# Patient Record
Sex: FEMALE | Race: WHITE | NOT HISPANIC OR LATINO | Employment: PART TIME | URBAN - METROPOLITAN AREA
[De-identification: names, ages, dates, MRNs, and addresses within clinical notes are randomized per-mention and may not be internally consistent; named-entity substitution may affect disease eponyms.]

---

## 2023-05-30 PROBLEM — R91.8 PULMONARY NODULES: Status: ACTIVE | Noted: 2023-05-30

## 2023-05-30 PROBLEM — J34.89 NASAL OBSTRUCTION: Status: ACTIVE | Noted: 2023-05-30

## 2023-05-30 PROBLEM — J32.9 CHRONIC SINUSITIS: Status: ACTIVE | Noted: 2023-05-30

## 2023-05-30 PROBLEM — H72.92 ACUTE OTITIS MEDIA WITH PERFORATION, LEFT: Status: ACTIVE | Noted: 2023-05-30

## 2023-05-30 PROBLEM — H72.00 CENTRAL PERFORATION OF TYMPANIC MEMBRANE: Status: ACTIVE | Noted: 2023-05-30

## 2023-05-30 PROBLEM — D17.9 LIPOMA: Status: ACTIVE | Noted: 2023-05-30

## 2023-05-30 PROBLEM — Z90.10 ABSENCE OF BREAST, ACQUIRED: Status: ACTIVE | Noted: 2023-05-30

## 2023-05-30 PROBLEM — H90.5 SENSORINEURAL HEARING LOSS OF RIGHT EAR: Status: ACTIVE | Noted: 2023-05-30

## 2023-05-30 PROBLEM — B37.0 ORAL THRUSH: Status: ACTIVE | Noted: 2023-05-30

## 2023-05-30 PROBLEM — M79.639 FOREARM PAIN: Status: ACTIVE | Noted: 2023-05-30

## 2023-05-30 PROBLEM — R07.89 CHEST DISCOMFORT: Status: ACTIVE | Noted: 2023-05-30

## 2023-05-30 PROBLEM — R22.0 LIP SWELLING: Status: ACTIVE | Noted: 2023-05-30

## 2023-05-30 PROBLEM — L98.9 SCALP LESION: Status: ACTIVE | Noted: 2023-05-30

## 2023-05-30 PROBLEM — Z98.84 S/P GASTRIC BYPASS: Status: ACTIVE | Noted: 2023-05-30

## 2023-05-30 PROBLEM — R42 DIZZINESS: Status: ACTIVE | Noted: 2023-05-30

## 2023-05-30 PROBLEM — M54.2 CERVICALGIA: Status: ACTIVE | Noted: 2023-05-30

## 2023-05-30 PROBLEM — R06.09 DYSPNEA ON EXERTION: Status: ACTIVE | Noted: 2023-05-30

## 2023-05-30 PROBLEM — J84.10 LUNG GRANULOMA (MULTI): Status: ACTIVE | Noted: 2023-05-30

## 2023-05-30 PROBLEM — T78.40XA ALLERGIC REACTION: Status: ACTIVE | Noted: 2023-05-30

## 2023-05-30 PROBLEM — H92.02 OTALGIA OF LEFT EAR: Status: ACTIVE | Noted: 2023-05-30

## 2023-05-30 PROBLEM — S16.1XXA CERVICAL STRAIN, INITIAL ENCOUNTER: Status: ACTIVE | Noted: 2023-05-30

## 2023-05-30 PROBLEM — H81.09 MENIERES DISEASE: Status: ACTIVE | Noted: 2023-05-30

## 2023-05-30 PROBLEM — H66.92 ACUTE OTITIS MEDIA WITH PERFORATION, LEFT: Status: ACTIVE | Noted: 2023-05-30

## 2023-05-30 PROBLEM — R91.8 LUNG MASS: Status: ACTIVE | Noted: 2023-05-30

## 2023-05-30 PROBLEM — S61.019A THUMB LACERATION: Status: ACTIVE | Noted: 2023-05-30

## 2023-05-30 PROBLEM — S52.123A RADIAL HEAD FRACTURE: Status: ACTIVE | Noted: 2023-05-30

## 2023-05-30 PROBLEM — H90.72 MIXED HEARING LOSS OF LEFT EAR: Status: ACTIVE | Noted: 2023-05-30

## 2023-05-30 PROBLEM — I25.10 CAD (CORONARY ARTERY DISEASE): Status: ACTIVE | Noted: 2023-05-30

## 2023-05-30 PROBLEM — M25.529 ELBOW PAIN: Status: ACTIVE | Noted: 2023-05-30

## 2023-05-30 PROBLEM — M25.539 WRIST PAIN: Status: ACTIVE | Noted: 2023-05-30

## 2023-05-30 PROBLEM — H65.01: Status: ACTIVE | Noted: 2023-05-30

## 2023-05-30 PROBLEM — Z90.13 ACQUIRED ABSENCE OF BOTH NIPPLES: Status: ACTIVE | Noted: 2023-05-30

## 2023-05-30 PROBLEM — H92.11 OTORRHEA OF RIGHT EAR: Status: ACTIVE | Noted: 2023-05-30

## 2023-05-30 PROBLEM — M25.332 SCAPHOLUNATE DISSOCIATION OF LEFT WRIST: Status: ACTIVE | Noted: 2023-05-30

## 2023-05-30 PROBLEM — E53.8 VITAMIN B12 DEFICIENCY: Status: ACTIVE | Noted: 2023-05-30

## 2023-05-30 PROBLEM — J33.9 NASAL POLYPS: Status: ACTIVE | Noted: 2023-05-30

## 2023-05-30 PROBLEM — E03.9 HYPOTHYROIDISM: Status: ACTIVE | Noted: 2023-05-30

## 2023-05-30 NOTE — PROGRESS NOTES
Subjective   Patient ID: Malathi Castellanos is a 58 y.o. female who presents for Follow-up, Coronary Artery Disease, Hypothyroidism, and MED refill. I last saw the patient on 06/28/2022.     HPI   Patient c/o frequent lip blisters. Onset off and on x 1-2 yrs. She states that she has seen numerous doctors and her lips continue to get worse. She admits to pain and the sores seem to be spreading. She states that it does not have a tingling like a cold sore. Multiple OTCs with no relief. She adds that the sores on the sides have not gone away at all, but the blisters come and go. She has not seen dermatology yet, she plans to schedule an appointment with Dr. Masterson.     She has not been taking her iron supplement regularly, but she states that she knows when her iron is going lower and will take her supplement then.     She mentions that she cannot lose weight. She tries to walk regularly. She has tried a weight loss injection in the past and notes she started to have reactions at the injection sites.     She notes that she stubbed her toe on Mother's Day and would like an x-ray. She adds that it did bend backward when it happened.     Review of Systems  Except positives as noted in the CC & HPI      Constitutional: Denies fevers, chills, night sweats, fatigue, weight changes, change in appetite    Eyes: Denies blurry vision, double vision    ENT: Denies otalgia, trouble hearing, tinnitus, vertigo, nasal congestion, rhinorrhea, sore throat    Neck: Denies swelling, masses    Cardiovascular: Denies chest pain, palpitations, edema, orthopnea, syncope    Respiratory: Denies dyspnea, cough, wheezing, postural nocturnal dyspnea    Gastrointestinal: Denies abdominal pain, nausea, vomiting, diarrhea, constipation, melena, hematochezia    Genitourinary: Denies dysuria, hematuria, frequency, urgency    Musculoskeletal: Denies back pain, neck pain, myalgias    Integumentary: Denies skin lesions, rashes  Neurological: Denies dizziness,  "headaches, confusion, limb weakness, paresthesias, syncope, convulsions    Psychiatric: Denies depression, anxiety, homicidal ideations, suicidal ideations, sleep disturbances    Endocrine: Denies polyphagia, polydipsia, polyuria, weakness, hair thinning, heat intolerance, cold intolerance, weight changes    Heme/Lymph: Denies easy bruising, easy bleeding, swollen glands     Objective   /72 (BP Location: Left arm, Patient Position: Sitting)   Pulse 77   Temp 36.7 °C (98.1 °F) (Temporal)   Resp 16   Ht 1.727 m (5' 8\")   Wt 95.7 kg (211 lb)   SpO2 98%   BMI 32.08 kg/m²     Physical Exam  114/76 on recheck of BP in the right arm.     Gen. Appearance - well-developed, well-nourished, 58 y.o., White female in no acute distress.     Skin - warm, pink and dry without rash or concerning lesions.    Mental Status - alert and oriented times 3. Normal mood and affect appropriate to mood.     Ears - Ear canals are clear bilaterally.  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 Chronic perforation on the left.     Mouth - pharynx is pink without exudates. Dentition is normal appearing. Tongue and uvula move in the midline. Mild erythema at the corners of the mouth, bilaterally, R > L. Area of erythema along the vermilion border of her lip.     Neck - supple without lymphadenopathy. Carotid pulses are normal without bruits. Thyroid is normal in midline without nodules.    Chest - lungs are clear to auscultation without rales, rhonchi or wheezes.    Heart - regular, rate, and rhythm without murmurs, rubs or gallops.     Abdomen - soft, obese, protuberant, nontender, nondistended. No masses, hepatomegaly or splenomegaly is noted. No rebound, rigidity or guarding is noted. Bowel sounds are normoactive.     Extremities - no cyanosis, clubbing or edema. Pedal pulses are 2+ normal at the dorsalis pedis and posterior tibial pulses bilaterally.     Right Foot - Normal alignment and arch. Skin, nails, color, turgor " are normal. No pain to palpation. Sensation intact. Muscle strength +5/5. Dorsalis pedis pulses normal. FROM of all joints. Gait normal. Pain with extension of the right great toe. Pain at the MTP joint, laterally.     Neurological - cranial nerves II through XII are grossly intact. Motor strength 5/5 at all fours.     Assessment/Plan   1. Healthcare maintenance  CBC and Auto Differential    Comprehensive Metabolic Panel      2. Hypothyroidism, unspecified type  levothyroxine (Synthroid, Levoxyl) 50 mcg tablet    TSH with reflex to Free T4 if abnormal    Follow Up In [facility] - PCP      3. Dizziness  triamterene-hydrochlorothiazid (Maxzide-25) 37.5-25 mg tablet      4. Coronary artery disease involving native heart, unspecified vessel or lesion type, unspecified whether angina present  Follow Up In [facility] - PCP      5. Candidal cheilitis  nystatin (Mycostatin) cream    Follow Up In [facility] - PCP      6. Pain of toe of right foot  XR toe right 2+ views      7. Mild anemia  Iron and TIBC    Folate      8. Vitamin B12 deficiency  Vitamin B12    DISCONTINUED: cyanocobalamin (Vitamin B-12) 1,000 mcg/mL injection      9. Class 1 obesity due to excess calories without serious comorbidity with body mass index (BMI) of 32.0 to 32.9 in adult  phentermine (Adipex-P) 37.5 mg tablet      10. S/P gastric bypass        Patient to continue current medications (with any exceptions as noted) and diet. Follow-up in 1 month(s) otherwise as needed.      Will obtain CBC, iron, TSH, CMP, B12, folate today. Will call patient with results when available.     Patient was started on:   Adipex 37.5 mg, TAKE 1 TAB BY MOUTH DAILY   Nystatin 591228 unit/gm Cream, APPLY AND RUB IN A THIN FILM TO AFFECTED AREA(S) TWICE DAILY    Patient was given refill(s) on:   Levothyroxine Sodium 50 mcg, TAKE 1 TAB BY MOUTH DAILY  Triamterene-hydrochlorothiazide 37.5-25 mg, TAKE 1 TAB BY MOUTH DAILY   Vitamin B12 1000  mcg/ml, INJECT AS DIRECTED    Rx(s) sent to pharmacy.     Ordered right toe x-rays. Will call patient with results when available.     Patient is to follow up with Dr. Masterson (derm).    Recommend she apply 30 SPF lip balm.     Patient signed medication contract today.     I personally have reviewed the OARRS report for this patient. This report is scanned into the electronic medical record. I have considered the risks of abuse, dependence, addiction and diversion. I believe that it is clinically appropriate for the patient to be prescribed this medication.       Scribe Attestation  By signing my name below, I, Faizan Sierra   attest that this documentation has been prepared under the direction and in the presence of Allan Tubbs MD.

## 2023-05-31 ENCOUNTER — TELEPHONE (OUTPATIENT)
Dept: PRIMARY CARE | Facility: CLINIC | Age: 58
End: 2023-05-31

## 2023-05-31 ENCOUNTER — OFFICE VISIT (OUTPATIENT)
Dept: PRIMARY CARE | Facility: CLINIC | Age: 58
End: 2023-05-31
Payer: COMMERCIAL

## 2023-05-31 VITALS
OXYGEN SATURATION: 98 % | BODY MASS INDEX: 31.98 KG/M2 | RESPIRATION RATE: 16 BRPM | SYSTOLIC BLOOD PRESSURE: 107 MMHG | DIASTOLIC BLOOD PRESSURE: 72 MMHG | HEIGHT: 68 IN | WEIGHT: 211 LBS | HEART RATE: 77 BPM | TEMPERATURE: 98.1 F

## 2023-05-31 DIAGNOSIS — E66.09 CLASS 1 OBESITY DUE TO EXCESS CALORIES WITHOUT SERIOUS COMORBIDITY WITH BODY MASS INDEX (BMI) OF 32.0 TO 32.9 IN ADULT: ICD-10-CM

## 2023-05-31 DIAGNOSIS — D64.9 MILD ANEMIA: ICD-10-CM

## 2023-05-31 DIAGNOSIS — E03.9 HYPOTHYROIDISM, UNSPECIFIED TYPE: ICD-10-CM

## 2023-05-31 DIAGNOSIS — B37.83 CANDIDAL CHEILITIS: ICD-10-CM

## 2023-05-31 DIAGNOSIS — E53.8 VITAMIN B12 DEFICIENCY: ICD-10-CM

## 2023-05-31 DIAGNOSIS — R42 DIZZINESS: ICD-10-CM

## 2023-05-31 DIAGNOSIS — Z00.00 HEALTHCARE MAINTENANCE: Primary | ICD-10-CM

## 2023-05-31 DIAGNOSIS — Z98.84 S/P GASTRIC BYPASS: ICD-10-CM

## 2023-05-31 DIAGNOSIS — M79.674 PAIN OF TOE OF RIGHT FOOT: ICD-10-CM

## 2023-05-31 DIAGNOSIS — I25.10 CORONARY ARTERY DISEASE INVOLVING NATIVE HEART, UNSPECIFIED VESSEL OR LESION TYPE, UNSPECIFIED WHETHER ANGINA PRESENT: ICD-10-CM

## 2023-05-31 PROBLEM — E66.811 CLASS 1 OBESITY DUE TO EXCESS CALORIES WITHOUT SERIOUS COMORBIDITY WITH BODY MASS INDEX (BMI) OF 32.0 TO 32.9 IN ADULT: Status: ACTIVE | Noted: 2023-05-31

## 2023-05-31 PROCEDURE — 99215 OFFICE O/P EST HI 40 MIN: CPT | Performed by: FAMILY MEDICINE

## 2023-05-31 PROCEDURE — 3008F BODY MASS INDEX DOCD: CPT | Performed by: FAMILY MEDICINE

## 2023-05-31 RX ORDER — LEVOTHYROXINE SODIUM 50 UG/1
50 TABLET ORAL DAILY
Qty: 90 TABLET | Refills: 3 | Status: SHIPPED | OUTPATIENT
Start: 2023-05-31 | End: 2023-05-31

## 2023-05-31 RX ORDER — LANOLIN ALCOHOL/MO/W.PET/CERES
1000 CREAM (GRAM) TOPICAL DAILY
COMMUNITY
End: 2023-05-31 | Stop reason: ENTERED-IN-ERROR

## 2023-05-31 RX ORDER — CYANOCOBALAMIN 1000 UG/ML
1000 INJECTION, SOLUTION INTRAMUSCULAR; SUBCUTANEOUS
Qty: 10 ML | Refills: 0 | Status: SHIPPED | OUTPATIENT
Start: 2023-05-31 | End: 2023-05-31

## 2023-05-31 RX ORDER — CYANOCOBALAMIN 1000 UG/ML
1000 INJECTION, SOLUTION INTRAMUSCULAR; SUBCUTANEOUS ONCE
Qty: 10 ML | Refills: 1 | Status: SHIPPED | OUTPATIENT
Start: 2023-05-31 | End: 2023-05-31 | Stop reason: SDUPTHER

## 2023-05-31 RX ORDER — TRIAMTERENE/HYDROCHLOROTHIAZID 37.5-25 MG
1 TABLET ORAL DAILY
Qty: 90 TABLET | Refills: 3 | Status: SHIPPED | OUTPATIENT
Start: 2023-05-31 | End: 2024-02-22 | Stop reason: SDUPTHER

## 2023-05-31 RX ORDER — NYSTATIN 100000 U/G
CREAM TOPICAL 2 TIMES DAILY
Qty: 15 G | Refills: 0 | Status: SHIPPED | OUTPATIENT
Start: 2023-05-31 | End: 2023-06-07

## 2023-05-31 RX ORDER — PHENTERMINE HYDROCHLORIDE 37.5 MG/1
37.5 TABLET ORAL
Qty: 30 TABLET | Refills: 0 | Status: SHIPPED | OUTPATIENT
Start: 2023-05-31 | End: 2023-06-28 | Stop reason: SDUPTHER

## 2023-05-31 NOTE — PATIENT INSTRUCTIONS
Patient to continue current medications (with any exceptions as noted) and diet. Follow-up in 1 month(s) otherwise as needed.      Will obtain CBC, iron, TSH, CMP, B12, folate today. Will call patient with results when available.     Patient was started on:   Adipex   Nystatin [id number] unit/gm Cream, APPLY AND RUB IN A THIN FILM TO AFFECTED AREA(S) TWICE DAILY    Patient was given refill(s) on:   Levothyroxine Sodium 50 mcg, TAKE 1 TAB BY MOUTH DAILY  Triamterene-hydrochlorothiazide 37.5-25 mg, TAKE 1 TAB BY MOUTH DAILY   Vitamin B12     Rx(s) sent to pharmacy.     Ordered right toe x-rays. Will call patient with results when available.     Patient is to follow up with Dr. Masterson (derm).    Recommend she apply 30 SPF lip balm.     Patient signed medication contract today.     I personally have reviewed the OARRS report for this patient. This report is scanned into the electronic medical record. I have considered the risks of abuse, dependence, addiction and diversion. I believe that it is clinically appropriate for the patient to be prescribed this medication.

## 2023-05-31 NOTE — TELEPHONE ENCOUNTER
"[facility] called wanting clarification on the b-12 injection prescription. The pharmacist stated \"the directions and the quantity do not add up\"  Please advise     "

## 2023-06-02 ENCOUNTER — LAB (OUTPATIENT)
Dept: LAB | Facility: LAB | Age: 58
End: 2023-06-02
Payer: COMMERCIAL

## 2023-06-02 DIAGNOSIS — E53.8 VITAMIN B12 DEFICIENCY: ICD-10-CM

## 2023-06-02 DIAGNOSIS — D64.9 MILD ANEMIA: ICD-10-CM

## 2023-06-02 DIAGNOSIS — E03.9 HYPOTHYROIDISM, UNSPECIFIED TYPE: ICD-10-CM

## 2023-06-02 DIAGNOSIS — Z00.00 HEALTHCARE MAINTENANCE: ICD-10-CM

## 2023-06-02 LAB
ALANINE AMINOTRANSFERASE (SGPT) (U/L) IN SER/PLAS: 18 U/L (ref 7–45)
ALBUMIN (G/DL) IN SER/PLAS: 4.3 G/DL (ref 3.4–5)
ALKALINE PHOSPHATASE (U/L) IN SER/PLAS: 90 U/L (ref 33–110)
ANION GAP IN SER/PLAS: 14 MMOL/L (ref 10–20)
ASPARTATE AMINOTRANSFERASE (SGOT) (U/L) IN SER/PLAS: 29 U/L (ref 9–39)
BASOPHILS (10*3/UL) IN BLOOD BY AUTOMATED COUNT: 0.06 X10E9/L (ref 0–0.1)
BASOPHILS/100 LEUKOCYTES IN BLOOD BY AUTOMATED COUNT: 1 % (ref 0–2)
BILIRUBIN TOTAL (MG/DL) IN SER/PLAS: 1 MG/DL (ref 0–1.2)
CALCIUM (MG/DL) IN SER/PLAS: 9.9 MG/DL (ref 8.6–10.3)
CARBON DIOXIDE, TOTAL (MMOL/L) IN SER/PLAS: 27 MMOL/L (ref 21–32)
CHLORIDE (MMOL/L) IN SER/PLAS: 100 MMOL/L (ref 98–107)
COBALAMIN (VITAMIN B12) (PG/ML) IN SER/PLAS: 1944 PG/ML (ref 211–911)
CREATININE (MG/DL) IN SER/PLAS: 0.8 MG/DL (ref 0.5–1.05)
EOSINOPHILS (10*3/UL) IN BLOOD BY AUTOMATED COUNT: 0.16 X10E9/L (ref 0–0.7)
EOSINOPHILS/100 LEUKOCYTES IN BLOOD BY AUTOMATED COUNT: 2.7 % (ref 0–6)
ERYTHROCYTE DISTRIBUTION WIDTH (RATIO) BY AUTOMATED COUNT: 15.5 % (ref 11.5–14.5)
ERYTHROCYTE MEAN CORPUSCULAR HEMOGLOBIN CONCENTRATION (G/DL) BY AUTOMATED: 30.7 G/DL (ref 32–36)
ERYTHROCYTE MEAN CORPUSCULAR VOLUME (FL) BY AUTOMATED COUNT: 88 FL (ref 80–100)
ERYTHROCYTES (10*6/UL) IN BLOOD BY AUTOMATED COUNT: 4.53 X10E12/L (ref 4–5.2)
FOLATE (NG/ML) IN SER/PLAS: >22.3 NG/ML
GFR FEMALE: 86 ML/MIN/1.73M2
GLUCOSE (MG/DL) IN SER/PLAS: 82 MG/DL (ref 74–99)
HEMATOCRIT (%) IN BLOOD BY AUTOMATED COUNT: 39.8 % (ref 36–46)
HEMOGLOBIN (G/DL) IN BLOOD: 12.2 G/DL (ref 12–16)
IMMATURE GRANULOCYTES/100 LEUKOCYTES IN BLOOD BY AUTOMATED COUNT: 0.5 % (ref 0–0.9)
IRON (UG/DL) IN SER/PLAS: 46 UG/DL (ref 35–150)
IRON BINDING CAPACITY (UG/DL) IN SER/PLAS: 424 UG/DL (ref 240–445)
IRON SATURATION (%) IN SER/PLAS: 11 % (ref 25–45)
LEUKOCYTES (10*3/UL) IN BLOOD BY AUTOMATED COUNT: 5.8 X10E9/L (ref 4.4–11.3)
LYMPHOCYTES (10*3/UL) IN BLOOD BY AUTOMATED COUNT: 1.96 X10E9/L (ref 1.2–4.8)
LYMPHOCYTES/100 LEUKOCYTES IN BLOOD BY AUTOMATED COUNT: 33.6 % (ref 13–44)
MONOCYTES (10*3/UL) IN BLOOD BY AUTOMATED COUNT: 0.63 X10E9/L (ref 0.1–1)
MONOCYTES/100 LEUKOCYTES IN BLOOD BY AUTOMATED COUNT: 10.8 % (ref 2–10)
NEUTROPHILS (10*3/UL) IN BLOOD BY AUTOMATED COUNT: 2.99 X10E9/L (ref 1.2–7.7)
NEUTROPHILS/100 LEUKOCYTES IN BLOOD BY AUTOMATED COUNT: 51.4 % (ref 40–80)
PLATELETS (10*3/UL) IN BLOOD AUTOMATED COUNT: 458 X10E9/L (ref 150–450)
POTASSIUM (MMOL/L) IN SER/PLAS: 3.4 MMOL/L (ref 3.5–5.3)
PROTEIN TOTAL: 7.7 G/DL (ref 6.4–8.2)
SODIUM (MMOL/L) IN SER/PLAS: 138 MMOL/L (ref 136–145)
THYROTROPIN (MIU/L) IN SER/PLAS BY DETECTION LIMIT <= 0.05 MIU/L: 3.31 MIU/L (ref 0.44–3.98)
UREA NITROGEN (MG/DL) IN SER/PLAS: 11 MG/DL (ref 6–23)

## 2023-06-02 PROCEDURE — 82746 ASSAY OF FOLIC ACID SERUM: CPT

## 2023-06-02 PROCEDURE — 80053 COMPREHEN METABOLIC PANEL: CPT

## 2023-06-02 PROCEDURE — 85025 COMPLETE CBC W/AUTO DIFF WBC: CPT

## 2023-06-02 PROCEDURE — 83540 ASSAY OF IRON: CPT

## 2023-06-02 PROCEDURE — 84443 ASSAY THYROID STIM HORMONE: CPT

## 2023-06-02 PROCEDURE — 36415 COLL VENOUS BLD VENIPUNCTURE: CPT

## 2023-06-02 PROCEDURE — 83550 IRON BINDING TEST: CPT

## 2023-06-02 PROCEDURE — 82607 VITAMIN B-12: CPT

## 2023-06-04 NOTE — RESULT ENCOUNTER NOTE
Please call the patient regarding her abnormal result.  Potassium is slightly low at 3.4.  Recommend patient increase her intake of high potassium containing foods.  Iron level is normal at 46.  Vitamin B12 level is high at 1944 which could be due to recent injection.  Patient may continue with vitamin B12 1000 mcg IM monthly.

## 2023-06-06 ENCOUNTER — TELEPHONE (OUTPATIENT)
Dept: PRIMARY CARE | Facility: CLINIC | Age: 58
End: 2023-06-06
Payer: COMMERCIAL

## 2023-06-06 NOTE — TELEPHONE ENCOUNTER
Patient called in stating she isn't able to sleep more than 3-4 hours at a time and is wondering if she could be prescribed ambien. Patient states she has been on trazodone when she was on chemo and it didn't do anything. Patient uses [facility] pharmacy. Please advise.     [phone]

## 2023-06-08 NOTE — TELEPHONE ENCOUNTER
Patient returned call and was made aware of Dr. Tubbs's message. Patient states the sleeping issue has been going on for years but states that it is okay and thanked me for asking for her.

## 2023-06-13 ENCOUNTER — TELEPHONE (OUTPATIENT)
Dept: PRIMARY CARE | Facility: CLINIC | Age: 58
End: 2023-06-13
Payer: COMMERCIAL

## 2023-06-13 NOTE — TELEPHONE ENCOUNTER
Rx Refill Request Telephone Encounter    Name:  Malathi Castellanos  :  [id number]  Medication Name:  phentermine (Adipex-P) 37.5 mg   Specific Pharmacy location:  [facility] [place]  Date of last appointment:    Date of next appointment:  NA  Best number to reach patient:  [phone]

## 2023-06-13 NOTE — TELEPHONE ENCOUNTER
Patient states was told to call after 2 weeks and request refill, patient has been scheduled for a 1 month visit as medication is controlled and patient will need to be weighed for her next Rx to be sent

## 2023-06-21 PROBLEM — M79.676 GREAT TOE PAIN: Status: ACTIVE | Noted: 2023-06-21

## 2023-06-21 PROBLEM — H90.8 MIXED HEARING LOSS, UNILATERAL: Status: ACTIVE | Noted: 2023-06-21

## 2023-06-21 PROBLEM — D64.9 ANEMIA: Status: ACTIVE | Noted: 2023-06-21

## 2023-06-27 NOTE — PROGRESS NOTES
"Subjective   Patient ID: Malathi Castellanos is a 58 y.o. female who presents for Weight Loss and Med Management.  I last saw the patient on 5/31/2023.     HPI   Patient has no medical complaints today.     Review of Systems  Except positives as noted in the CC & HPI.  Patient is tolerating the medication well and denies any side effects.    Constitutional: Denies fevers, chills, night sweats, fatigue, weight changes, change in appetite    Eyes: Denies blurry vision, double vision    ENT: Denies otalgia, trouble hearing, tinnitus, vertigo, nasal congestion, rhinorrhea, sore throat    Neck: Denies swelling, masses    Cardiovascular: Denies chest pain, palpitations, edema, orthopnea, syncope    Respiratory: Denies dyspnea, cough, wheezing, postural nocturnal dyspnea    Gastrointestinal: Denies abdominal pain, nausea, vomiting, diarrhea, constipation, melena, hematochezia    Genitourinary: Denies dysuria, hematuria, frequency, urgency    Musculoskeletal: Denies back pain, neck pain, arthralgias, myalgias    Integumentary: Denies skin lesions, rashes, masses    Neurological: Denies dizziness, headaches, confusion, limb weakness, paresthesias, syncope, convulsions    Psychiatric: Denies depression, anxiety, homicidal ideations, suicidal ideations, sleep disturbances    Endocrine: Denies polyphagia, polydipsia, polyuria, weakness, hair thinning, heat intolerance, cold intolerance, weight changes    Heme/Lymph: Denies easy bruising, easy bleeding, swollen glands     Objective   /80   Pulse 75   Temp 36 °C (96.8 °F)   Resp 16   Ht 1.727 m (5' 8\")   Wt 90.5 kg (199 lb 9.6 oz)   SpO2 97%   BMI 30.35 kg/m²     Physical Exam  Gen. Appearance - well-developed, well-nourished, 58 y.o., White female in no acute distress.     Skin - warm, pink and dry without rash or concerning lesions.    Mental Status - alert and oriented times 3. Normal mood and affect appropriate to mood.     Neck - supple without lymphadenopathy. " Carotid pulses are normal without bruits. Thyroid is normal in midline without nodules.    Chest - lungs are clear to auscultation without rales, rhonchi or wheezes.    Heart - regular, rate, and rhythm without murmurs, rubs or gallops.     Extremities - no cyanosis, clubbing or edema. Pedal pulses are 2+ normal at the dorsalis pedis and posterior tibial pulses bilaterally.     Neurological - cranial nerves II through XII are grossly intact. Motor strength 5/5 at all fours.     Lab Results   Component Value Date    ALBUMIN 4.3 06/02/2023    ALT 18 06/02/2023    AST 29 06/02/2023    BUN 11 06/02/2023    CALCIUM 9.9 06/02/2023     06/02/2023    CO2 27 06/02/2023    CREATININE 0.80 06/02/2023    GFRF 86 06/02/2023    GLUCOSE 82 06/02/2023    HCT 39.8 06/02/2023    HGB 12.2 06/02/2023    K 3.4 (L) 06/02/2023     06/02/2023     (H) 06/02/2023    TSH 3.31 06/02/2023    WBC 5.8 06/02/2023     Results  Reviewed chest CT results from 10/12/22.      Assessment/Plan   1. Class 1 obesity due to excess calories without serious comorbidity with body mass index (BMI) of 30.0 to 30.9 in adult  phentermine (Adipex-P) 37.5 mg tablet    Follow Up In [facility] - PCP - Established      2. Lung granuloma (CMS/HCC)        Patient to continue current medications (with any exceptions as noted) and diet. Follow-up in 1 month(s) otherwise as needed.      Patient was given refill(s) on:   Phentermine 37.5 mg, TAKE 1 TAB BY MOUTH DAILY     Rx(s) sent to pharmacy.         Scribe Attestation  By signing my name below, I, Faizan Sierra   attest that this documentation has been prepared under the direction and in the presence of Allan Tubbs MD.

## 2023-06-28 ENCOUNTER — OFFICE VISIT (OUTPATIENT)
Dept: PRIMARY CARE | Facility: CLINIC | Age: 58
End: 2023-06-28
Payer: COMMERCIAL

## 2023-06-28 VITALS
HEART RATE: 75 BPM | DIASTOLIC BLOOD PRESSURE: 80 MMHG | HEIGHT: 68 IN | TEMPERATURE: 96.8 F | BODY MASS INDEX: 30.25 KG/M2 | OXYGEN SATURATION: 97 % | WEIGHT: 199.6 LBS | RESPIRATION RATE: 16 BRPM | SYSTOLIC BLOOD PRESSURE: 120 MMHG

## 2023-06-28 DIAGNOSIS — J84.10 LUNG GRANULOMA (MULTI): ICD-10-CM

## 2023-06-28 DIAGNOSIS — E66.09 CLASS 1 OBESITY DUE TO EXCESS CALORIES WITHOUT SERIOUS COMORBIDITY WITH BODY MASS INDEX (BMI) OF 30.0 TO 30.9 IN ADULT: Primary | ICD-10-CM

## 2023-06-28 PROBLEM — I25.10 CAD (CORONARY ARTERY DISEASE): Status: RESOLVED | Noted: 2023-05-30 | Resolved: 2023-06-28

## 2023-06-28 PROCEDURE — 3008F BODY MASS INDEX DOCD: CPT | Performed by: FAMILY MEDICINE

## 2023-06-28 PROCEDURE — 99212 OFFICE O/P EST SF 10 MIN: CPT | Performed by: FAMILY MEDICINE

## 2023-06-28 PROCEDURE — 1036F TOBACCO NON-USER: CPT | Performed by: FAMILY MEDICINE

## 2023-06-28 RX ORDER — PHENTERMINE HYDROCHLORIDE 37.5 MG/1
37.5 TABLET ORAL
Qty: 30 TABLET | Refills: 0 | Status: SHIPPED | OUTPATIENT
Start: 2023-06-28 | End: 2023-07-19 | Stop reason: SDUPTHER

## 2023-06-28 ASSESSMENT — LIFESTYLE VARIABLES
HOW MANY STANDARD DRINKS CONTAINING ALCOHOL DO YOU HAVE ON A TYPICAL DAY: PATIENT DOES NOT DRINK
HOW OFTEN DO YOU HAVE SIX OR MORE DRINKS ON ONE OCCASION: NEVER
AUDIT-C TOTAL SCORE: 0
HOW OFTEN DO YOU HAVE A DRINK CONTAINING ALCOHOL: NEVER
SKIP TO QUESTIONS 9-10: 1

## 2023-06-28 ASSESSMENT — PATIENT HEALTH QUESTIONNAIRE - PHQ9
2. FEELING DOWN, DEPRESSED OR HOPELESS: NOT AT ALL
SUM OF ALL RESPONSES TO PHQ9 QUESTIONS 1 & 2: 0
1. LITTLE INTEREST OR PLEASURE IN DOING THINGS: NOT AT ALL

## 2023-06-28 NOTE — PATIENT INSTRUCTIONS
Patient to continue current medications (with any exceptions as noted) and diet. Follow-up in 1 month(s) otherwise as needed.      Patient was given refill(s) on:   Phentermine 37.5 mg, TAKE 1 TAB BY MOUTH DAILY     Rx(s) sent to pharmacy.

## 2023-07-19 ENCOUNTER — OFFICE VISIT (OUTPATIENT)
Dept: PRIMARY CARE | Facility: CLINIC | Age: 58
End: 2023-07-19
Payer: COMMERCIAL

## 2023-07-19 VITALS
HEIGHT: 68 IN | BODY MASS INDEX: 29.15 KG/M2 | WEIGHT: 192.3 LBS | SYSTOLIC BLOOD PRESSURE: 104 MMHG | DIASTOLIC BLOOD PRESSURE: 78 MMHG | HEART RATE: 75 BPM | TEMPERATURE: 98.2 F | RESPIRATION RATE: 16 BRPM | OXYGEN SATURATION: 96 %

## 2023-07-19 DIAGNOSIS — E66.09 CLASS 1 OBESITY DUE TO EXCESS CALORIES WITHOUT SERIOUS COMORBIDITY WITH BODY MASS INDEX (BMI) OF 30.0 TO 30.9 IN ADULT: ICD-10-CM

## 2023-07-19 DIAGNOSIS — E03.9 ACQUIRED HYPOTHYROIDISM: Primary | ICD-10-CM

## 2023-07-19 PROCEDURE — 99213 OFFICE O/P EST LOW 20 MIN: CPT | Performed by: FAMILY MEDICINE

## 2023-07-19 PROCEDURE — 1036F TOBACCO NON-USER: CPT | Performed by: FAMILY MEDICINE

## 2023-07-19 PROCEDURE — 3008F BODY MASS INDEX DOCD: CPT | Performed by: FAMILY MEDICINE

## 2023-07-19 RX ORDER — PHENTERMINE HYDROCHLORIDE 37.5 MG/1
37.5 TABLET ORAL
Qty: 30 TABLET | Refills: 0 | Status: SHIPPED | OUTPATIENT
Start: 2023-07-28 | End: 2024-02-22 | Stop reason: SDUPTHER

## 2023-07-19 ASSESSMENT — LIFESTYLE VARIABLES
HOW OFTEN DO YOU HAVE A DRINK CONTAINING ALCOHOL: NEVER
AUDIT-C TOTAL SCORE: 0
HOW OFTEN DO YOU HAVE SIX OR MORE DRINKS ON ONE OCCASION: NEVER
SKIP TO QUESTIONS 9-10: 1
HOW MANY STANDARD DRINKS CONTAINING ALCOHOL DO YOU HAVE ON A TYPICAL DAY: PATIENT DOES NOT DRINK

## 2023-07-19 ASSESSMENT — PATIENT HEALTH QUESTIONNAIRE - PHQ9
SUM OF ALL RESPONSES TO PHQ9 QUESTIONS 1 & 2: 0
2. FEELING DOWN, DEPRESSED OR HOPELESS: NOT AT ALL
1. LITTLE INTEREST OR PLEASURE IN DOING THINGS: NOT AT ALL

## 2023-07-19 NOTE — PROGRESS NOTES
"Subjective   Patient ID: Malathi Castellanos is a 58 y.o. female who presents for Hypothyroidism and Follow-up. I last saw the patient on 6/28/2023.     HPI   Patient has no medical complaints today.     Last visit she weighed 199lbs and this OV she is 192lbs.     Patient denies any side effects from the Adipex and states that she is tolerating it well.     Review of Systems  Except positives as noted in the CC & HPI      Constitutional: Denies fevers, chills, night sweats, fatigue, weight changes, change in appetite    Eyes: Denies blurry vision, double vision    ENT: Denies otalgia, trouble hearing, tinnitus, vertigo, nasal congestion, rhinorrhea, sore throat    Neck: Denies swelling, masses    Cardiovascular: Denies chest pain, palpitations, edema, orthopnea, syncope    Respiratory: Denies dyspnea, cough, wheezing, postural nocturnal dyspnea    Gastrointestinal: Denies abdominal pain, nausea, vomiting, diarrhea, constipation, melena, hematochezia    Genitourinary: Denies dysuria, hematuria, frequency, urgency    Musculoskeletal: Denies back pain, neck pain, arthralgias, myalgias    Integumentary: Denies skin lesions, rashes, masses    Neurological: Denies dizziness, headaches, confusion, limb weakness, paresthesias, syncope, convulsions    Psychiatric: Denies depression, anxiety, homicidal ideations, suicidal ideations, sleep disturbances    Endocrine: Denies polyphagia, polydipsia, polyuria, weakness, hair thinning, heat intolerance, cold intolerance, weight changes    Heme/Lymph: Denies easy bruising, easy bleeding, swollen glands    Objective   /78 (BP Location: Right arm, Patient Position: Sitting)   Pulse 75   Temp 36.8 °C (98.2 °F)   Resp 16   Ht 1.727 m (5' 8\")   Wt 87.2 kg (192 lb 4.8 oz)   SpO2 96%   BMI 29.24 kg/m²     Physical Exam  104/78 on recheck of BP in the right arm.     Gen. Appearance - well-developed, well-nourished, 58 y.o., White female in no acute distress.     Skin - warm, pink and " dry without rash or concerning lesions.    Mental Status - alert and oriented times 3. Normal mood and affect appropriate to mood.     Neck - supple without lymphadenopathy. Carotid pulses are normal without bruits. Thyroid is normal in midline without nodules.    Chest - lungs are clear to auscultation without rales, rhonchi or wheezes.    Heart - regular, rate, and rhythm without murmurs, rubs or gallops.     Extremities - no cyanosis, clubbing or edema. Pedal pulses are 2+ normal at the dorsalis pedis and posterior tibial pulses bilaterally.     Neurological - cranial nerves II through XII are grossly intact. Motor strength 5/5 at all fours.     Assessment/Plan   1. Acquired hypothyroidism        2. Class 1 obesity due to excess calories without serious comorbidity with body mass index (BMI) of 30.0 to 30.9 in adult  Follow Up In [facility] - PCP - Established    phentermine (Adipex-P) 37.5 mg tablet      Patient to continue current medications (with any exceptions as noted) and diet. Follow-up in 6-12 month(s) otherwise as needed.      Patient was given refill(s) on:   Phentermine 37.5 mg, TAKE 1 TAB BY MOUTH DAILY (to be refilled on 7/28)    Rx(s) sent to pharmacy.     This will be patient's last dose of Phentermine.     I personally have reviewed the OARRS report for this patient. This report is scanned into the electronic medical record. I have considered the risks of abuse, dependence, addiction and diversion. I believe that it is clinically appropriate for the patient to be prescribed this medication.       Scribe Attestation  By signing my name below, I, Faizan Sierra   attest that this documentation has been prepared under the direction and in the presence of Allan Tubbs MD.

## 2023-07-19 NOTE — PATIENT INSTRUCTIONS
Patient to continue current medications (with any exceptions as noted) and diet. Follow-up in 6-12 month(s) otherwise as needed.      Patient was given refill(s) on:   Phentermine 37.5 mg, TAKE 1 TAB BY MOUTH DAILY (to be refilled on 7/28)    Rx(s) sent to pharmacy.     This will be patient's last dose of Phentermine.     I personally have reviewed the OARRS report for this patient. This report is scanned into the electronic medical record. I have considered the risks of abuse, dependence, addiction and diversion. I believe that it is clinically appropriate for the patient to be prescribed this medication.

## 2023-08-31 ENCOUNTER — TELEPHONE (OUTPATIENT)
Dept: PRIMARY CARE | Facility: CLINIC | Age: 58
End: 2023-08-31
Payer: COMMERCIAL

## 2023-08-31 DIAGNOSIS — J84.10 LUNG GRANULOMA (MULTI): Primary | ICD-10-CM

## 2023-08-31 DIAGNOSIS — R91.8 PULMONARY NODULES: ICD-10-CM

## 2023-08-31 NOTE — TELEPHONE ENCOUNTER
PATIENT CALLED STATING SHE HAS A HISTORY OF LUNG GRANULOMAS AND SHE STATED SHE FEELS LIKE SHE CANNOT TAKE A DEEP BREATH, SHE SAID SHE IS NOT SHORT OF BREATH.. SHE IS REQUESTING AN ORDER FOR A CT SCAN OR AN XRAY.   PLEASE ADVISE

## 2023-08-31 NOTE — TELEPHONE ENCOUNTER
Left message informing the patient CT scan has been ordered and patient will be contacted to schedule.

## 2023-11-20 ENCOUNTER — PHARMACY VISIT (OUTPATIENT)
Dept: PHARMACY | Facility: CLINIC | Age: 58
End: 2023-11-20
Payer: COMMERCIAL

## 2023-11-20 PROCEDURE — RXMED WILLOW AMBULATORY MEDICATION CHARGE

## 2023-11-20 RX ORDER — TRIAMTERENE/HYDROCHLOROTHIAZID 37.5-25 MG
1 TABLET ORAL DAILY
Qty: 90 TABLET | Refills: 1 | OUTPATIENT
Start: 2023-11-20 | End: 2024-02-22 | Stop reason: SDUPTHER

## 2024-01-03 DIAGNOSIS — J34.89 NASAL VESTIBULITIS: Primary | ICD-10-CM

## 2024-01-03 RX ORDER — MUPIROCIN 20 MG/G
OINTMENT TOPICAL
Qty: 22 G | Refills: 2 | Status: SHIPPED | OUTPATIENT
Start: 2024-01-03 | End: 2024-02-02

## 2024-01-25 ENCOUNTER — TELEPHONE (OUTPATIENT)
Dept: PRIMARY CARE | Facility: CLINIC | Age: 59
End: 2024-01-25
Payer: COMMERCIAL

## 2024-01-25 NOTE — TELEPHONE ENCOUNTER
Patient called in regarding adipex. She stated she was advised she would have to wait a few months before being able to restart it. Patient is wondering if Dr. Tubbs knows when she stopped and when she can start again? 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

## 2024-01-29 ENCOUNTER — PHARMACY VISIT (OUTPATIENT)
Dept: PHARMACY | Facility: CLINIC | Age: 59
End: 2024-01-29
Payer: COMMERCIAL

## 2024-01-29 PROCEDURE — RXMED WILLOW AMBULATORY MEDICATION CHARGE

## 2024-01-29 RX ORDER — AMOXICILLIN AND CLAVULANATE POTASSIUM 875; 125 MG/1; MG/1
875 TABLET, FILM COATED ORAL 2 TIMES DAILY
Qty: 14 TABLET | Refills: 0 | OUTPATIENT
Start: 2024-01-29 | End: 2024-03-20 | Stop reason: ALTCHOICE

## 2024-02-13 PROCEDURE — RXMED WILLOW AMBULATORY MEDICATION CHARGE

## 2024-02-17 ENCOUNTER — PHARMACY VISIT (OUTPATIENT)
Dept: PHARMACY | Facility: CLINIC | Age: 59
End: 2024-02-17
Payer: COMMERCIAL

## 2024-02-22 ENCOUNTER — OFFICE VISIT (OUTPATIENT)
Dept: PRIMARY CARE | Facility: CLINIC | Age: 59
End: 2024-02-22
Payer: COMMERCIAL

## 2024-02-22 VITALS
OXYGEN SATURATION: 100 % | WEIGHT: 206.4 LBS | BODY MASS INDEX: 31.28 KG/M2 | DIASTOLIC BLOOD PRESSURE: 76 MMHG | HEART RATE: 69 BPM | HEIGHT: 68 IN | TEMPERATURE: 97.2 F | SYSTOLIC BLOOD PRESSURE: 116 MMHG | RESPIRATION RATE: 16 BRPM

## 2024-02-22 DIAGNOSIS — E03.9 ACQUIRED HYPOTHYROIDISM: Primary | ICD-10-CM

## 2024-02-22 DIAGNOSIS — E66.09 CLASS 1 OBESITY DUE TO EXCESS CALORIES WITHOUT SERIOUS COMORBIDITY WITH BODY MASS INDEX (BMI) OF 31.0 TO 31.9 IN ADULT: ICD-10-CM

## 2024-02-22 DIAGNOSIS — H81.03 MENIERE'S DISEASE OF BOTH EARS: ICD-10-CM

## 2024-02-22 PROBLEM — R06.09 DYSPNEA ON EXERTION: Status: RESOLVED | Noted: 2023-05-30 | Resolved: 2024-02-22

## 2024-02-22 PROBLEM — T78.40XA ALLERGIC REACTION: Status: RESOLVED | Noted: 2023-05-30 | Resolved: 2024-02-22

## 2024-02-22 PROBLEM — M79.676 GREAT TOE PAIN: Status: RESOLVED | Noted: 2023-06-21 | Resolved: 2024-02-22

## 2024-02-22 PROBLEM — M25.539 WRIST PAIN: Status: RESOLVED | Noted: 2023-05-30 | Resolved: 2024-02-22

## 2024-02-22 PROBLEM — S52.123A RADIAL HEAD FRACTURE: Status: RESOLVED | Noted: 2023-05-30 | Resolved: 2024-02-22

## 2024-02-22 PROBLEM — M79.639 FOREARM PAIN: Status: RESOLVED | Noted: 2023-05-30 | Resolved: 2024-02-22

## 2024-02-22 PROBLEM — M25.332 SCAPHOLUNATE DISSOCIATION OF LEFT WRIST: Status: RESOLVED | Noted: 2023-05-30 | Resolved: 2024-02-22

## 2024-02-22 PROBLEM — L98.9 SCALP LESION: Status: RESOLVED | Noted: 2023-05-30 | Resolved: 2024-02-22

## 2024-02-22 PROBLEM — M25.529 ELBOW PAIN: Status: RESOLVED | Noted: 2023-05-30 | Resolved: 2024-02-22

## 2024-02-22 PROBLEM — S61.019A THUMB LACERATION: Status: RESOLVED | Noted: 2023-05-30 | Resolved: 2024-02-22

## 2024-02-22 PROBLEM — S16.1XXA CERVICAL STRAIN, INITIAL ENCOUNTER: Status: RESOLVED | Noted: 2023-05-30 | Resolved: 2024-02-22

## 2024-02-22 PROBLEM — M54.2 CERVICALGIA: Status: RESOLVED | Noted: 2023-05-30 | Resolved: 2024-02-22

## 2024-02-22 PROBLEM — R22.0 LIP SWELLING: Status: RESOLVED | Noted: 2023-05-30 | Resolved: 2024-02-22

## 2024-02-22 PROBLEM — B37.0 ORAL THRUSH: Status: RESOLVED | Noted: 2023-05-30 | Resolved: 2024-02-22

## 2024-02-22 PROBLEM — R07.89 CHEST DISCOMFORT: Status: RESOLVED | Noted: 2023-05-30 | Resolved: 2024-02-22

## 2024-02-22 PROCEDURE — 99213 OFFICE O/P EST LOW 20 MIN: CPT | Performed by: FAMILY MEDICINE

## 2024-02-22 PROCEDURE — 3008F BODY MASS INDEX DOCD: CPT | Performed by: FAMILY MEDICINE

## 2024-02-22 PROCEDURE — 1036F TOBACCO NON-USER: CPT | Performed by: FAMILY MEDICINE

## 2024-02-22 RX ORDER — LEVOTHYROXINE SODIUM 50 UG/1
50 TABLET ORAL DAILY
Qty: 90 TABLET | Refills: 3 | Status: SHIPPED | OUTPATIENT
Start: 2024-02-22 | End: 2025-02-21

## 2024-02-22 RX ORDER — PHENTERMINE HYDROCHLORIDE 37.5 MG/1
37.5 TABLET ORAL
Qty: 30 TABLET | Refills: 0 | Status: SHIPPED | OUTPATIENT
Start: 2024-02-22 | End: 2024-02-22 | Stop reason: SDUPTHER

## 2024-02-22 RX ORDER — TRIAMTERENE/HYDROCHLOROTHIAZID 37.5-25 MG
1 TABLET ORAL DAILY
Qty: 90 TABLET | Refills: 3 | Status: SHIPPED | OUTPATIENT
Start: 2024-02-22

## 2024-02-22 RX ORDER — PHENTERMINE HYDROCHLORIDE 37.5 MG/1
37.5 TABLET ORAL
Qty: 30 TABLET | Refills: 0 | Status: SHIPPED | OUTPATIENT
Start: 2024-02-22 | End: 2024-03-20 | Stop reason: SDUPTHER

## 2024-02-22 ASSESSMENT — ANXIETY QUESTIONNAIRES
6. BECOMING EASILY ANNOYED OR IRRITABLE: NOT AT ALL
4. TROUBLE RELAXING: NOT AT ALL
1. FEELING NERVOUS, ANXIOUS, OR ON EDGE: NOT AT ALL
5. BEING SO RESTLESS THAT IT IS HARD TO SIT STILL: NOT AT ALL
2. NOT BEING ABLE TO STOP OR CONTROL WORRYING: NOT AT ALL
IF YOU CHECKED OFF ANY PROBLEMS ON THIS QUESTIONNAIRE, HOW DIFFICULT HAVE THESE PROBLEMS MADE IT FOR YOU TO DO YOUR WORK, TAKE CARE OF THINGS AT HOME, OR GET ALONG WITH OTHER PEOPLE: NOT DIFFICULT AT ALL
3. WORRYING TOO MUCH ABOUT DIFFERENT THINGS: NOT AT ALL
7. FEELING AFRAID AS IF SOMETHING AWFUL MIGHT HAPPEN: NOT AT ALL
GAD7 TOTAL SCORE: 0

## 2024-02-22 NOTE — PROGRESS NOTES
"Subjective   Patient ID: Malathi Castellanos is a 58 y.o. female who presents for Med Management.  I last saw the patient on 7/19/2023.     HPI   Patient would like to start phentermine again. She is wondering what she can do about her weight after she finishes Phentermine.     Review of Systems  Except positives as noted in the CC & HPI      Constitutional: Denies fevers, chills, night sweats, fatigue, weight changes, change in appetite    Eyes: Denies blurry vision, double vision    ENT: Denies otalgia, trouble hearing, tinnitus, vertigo, nasal congestion, rhinorrhea, sore throat    Neck: Denies swelling, masses    Cardiovascular: Denies chest pain, palpitations, edema, orthopnea, syncope    Respiratory: Denies dyspnea, cough, wheezing, postural nocturnal dyspnea    Gastrointestinal: Denies abdominal pain, nausea, vomiting, diarrhea, constipation, melena, hematochezia    Genitourinary: Denies dysuria, hematuria, frequency, urgency    Musculoskeletal: Denies back pain, neck pain, arthralgias, myalgias    Integumentary: Denies skin lesions, rashes, masses    Neurological: Denies dizziness, headaches, confusion, limb weakness, paresthesias, syncope, convulsions    Psychiatric: Denies depression, anxiety, homicidal ideations, suicidal ideations, sleep disturbances    Endocrine: Denies polyphagia, polydipsia, polyuria, weakness, hair thinning, heat intolerance, cold intolerance, weight changes    Heme/Lymph: Denies easy bruising, easy bleeding, swollen glands    Objective   /76 (BP Location: Right arm, Patient Position: Sitting)   Pulse 69   Temp 36.2 °C (97.2 °F)   Resp 16   Ht 1.727 m (5' 8\")   Wt 93.6 kg (206 lb 6.4 oz)   SpO2 100%   BMI 31.38 kg/m²     Physical Exam  116/76 on recheck of BP in the right arm.     Gen. Appearance - well-developed, well-nourished, 58 y.o., White female in no acute distress.     Skin - warm, pink and dry without rash or concerning lesions.     Mental Status - alert and oriented " times 3. Normal mood and affect appropriate to mood.     Neck - supple without lymphadenopathy. Carotid pulses are normal without bruits. Thyroid is normal in midline without nodules.    Chest - lungs are clear to auscultation without rales, rhonchi or wheezes.    Heart - regular, rate, and rhythm without murmurs, rubs or gallops.     Abdomen - soft, obese, protuberant, nontender, nondistended. No masses, hepatomegaly or splenomegaly is noted. No rebound, rigidity or guarding is noted. Bowel sounds are normoactive.     Extremities - no cyanosis, clubbing or edema. Pedal pulses are 2+ normal at the dorsalis pedis and posterior tibial pulses bilaterally.     Neurological - cranial nerves II through XII are grossly intact. Motor strength 5/5 at all fours.     Assessment/Plan   1. Acquired hypothyroidism  levothyroxine (Synthroid, Levoxyl) 50 mcg tablet    Follow Up In [facility] - PCP - Established      2. Meniere's disease of both ears  triamterene-hydrochlorothiazid (Maxzide-25) 37.5-25 mg tablet      3. Class 1 obesity due to excess calories without serious comorbidity with body mass index (BMI) of 31.0 to 31.9 in adult  phentermine (Adipex-P) 37.5 mg tablet    DISCONTINUED: phentermine (Adipex-P) 37.5 mg tablet      Patient to continue current medications (with any exceptions as noted) and diet. Follow-up in 1 month(s) otherwise as needed.     Patient was started on:   Phentermine 37.5 mg, TAKE 1 TAB BY MOUTH DAILY      Patient was given refill(s) on:   Levothyroxine Sodium 50 mcg, TAKE 1 TAB BY MOUTH DAILY  Triamterene-hydrochlorothiazide 37.5-25 mg, TAKE 1 TAB BY MOUTH DAILY     Rx(s) sent to pharmacy.     Consider switching patient to Semaglutide after 3 months on Phentermine. Discussed side effects of medication with the patient.       Scribe Attestation  By signing my name below, ICynthia, Faizan   attest that this documentation has been prepared under the direction and in the presence of  Allan Tubbs MD.

## 2024-02-22 NOTE — PATIENT INSTRUCTIONS
Patient to continue current medications (with any exceptions as noted) and diet. Follow-up in 1 month(s) otherwise as needed.     Patient was started on:   Phentermine 37.5 mg, TAKE 1 TAB BY MOUTH DAILY      Patient was given refill(s) on:   Levothyroxine Sodium 50 mcg, TAKE 1 TAB BY MOUTH DAILY  Triamterene-hydrochlorothiazide 37.5-25 mg, TAKE 1 TAB BY MOUTH DAILY     Rx(s) sent to pharmacy.

## 2024-03-15 ENCOUNTER — OFFICE VISIT (OUTPATIENT)
Dept: OTOLARYNGOLOGY | Facility: CLINIC | Age: 59
End: 2024-03-15
Payer: COMMERCIAL

## 2024-03-15 DIAGNOSIS — R07.0 THROAT PAIN: ICD-10-CM

## 2024-03-15 DIAGNOSIS — H92.01 OTALGIA, RIGHT: ICD-10-CM

## 2024-03-15 DIAGNOSIS — M54.2 NECK PAIN: Primary | ICD-10-CM

## 2024-03-15 PROCEDURE — 99203 OFFICE O/P NEW LOW 30 MIN: CPT | Performed by: OTOLARYNGOLOGY

## 2024-03-15 PROCEDURE — 31575 DIAGNOSTIC LARYNGOSCOPY: CPT | Performed by: OTOLARYNGOLOGY

## 2024-03-15 PROCEDURE — 3008F BODY MASS INDEX DOCD: CPT | Performed by: OTOLARYNGOLOGY

## 2024-03-15 PROCEDURE — 1036F TOBACCO NON-USER: CPT | Performed by: OTOLARYNGOLOGY

## 2024-03-15 ASSESSMENT — ENCOUNTER SYMPTOMS
NEUROLOGICAL NEGATIVE: 1
RESPIRATORY NEGATIVE: 1
CARDIOVASCULAR NEGATIVE: 1
CONSTITUTIONAL NEGATIVE: 1

## 2024-03-15 NOTE — PROGRESS NOTES
Subjective   Patient ID: Malathi Castellanos is a 58 y.o. female who presents for CHECK EARS / FLUID? MASTOIDITIS ?.    HPI    Patient has been noted to have significant pain and discomfort involving the right mastoid/neck region.  This extends from the level of the mastoid down into the sternocleidomastoid.  Patient has had some sore throat phenomenon and referred otalgia to the right ear as well.  All remaining ENT inquiry is clear.  No change in voice or swallow.    Review of Systems   Constitutional: Negative.    HENT: Negative.     Respiratory: Negative.     Cardiovascular: Negative.    Neurological: Negative.      Physical Exam    General appearance: No acute distress. Normal facies. Symmetric facial movement. No gross lesions of the face are noted.  The external ear structures appear normal. The ear canals patent and the tympanic membranes are intact without evidence of air-fluid levels, retraction, or congenital defects.  Anterior rhinoscopy notes essentially a midline nasal septum. Examination is noted for normal healthy mucosal membranes without any evidence of lesions, polyps, or exudate. The tongue is normally mobile. There are no lesions on the gingiva, buccal, or oral mucosa. There are no oral cavity masses.  The neck is negative for mass lymphadenopathy. The trachea and parotid are clear. The thyroid bed is grossly unremarkable. The salivary gland structures are grossly unremarkable.    Procedure:  In order to assess the larynx, flexible laryngoscopy was performed based on the patient's history.  After topical anesthesia, a very complete flexible laryngoscopy was performed. This examination reveals a normal appearance to the laryngeal structures including the true cords, false cords, epiglottis, base of tongue, and piriform sinus, except as noted.      Assessment/Plan     58-year-old female with significant right-sided throat/ear/neck pain.  Because of this we will send her for dedicated CT of the neck with  contrast.  If that is unremarkable, I will recommend that she be evaluated by our dedicated pain management colleague Dr. Moss.  All questions were answered in this regard accordingly.

## 2024-03-18 ENCOUNTER — HOSPITAL ENCOUNTER (OUTPATIENT)
Dept: RADIOLOGY | Facility: CLINIC | Age: 59
Discharge: HOME | End: 2024-03-18
Payer: COMMERCIAL

## 2024-03-18 PROCEDURE — 2550000001 HC RX 255 CONTRASTS: Performed by: OTOLARYNGOLOGY

## 2024-03-18 PROCEDURE — 70491 CT SOFT TISSUE NECK W/DYE: CPT

## 2024-03-18 PROCEDURE — 70491 CT SOFT TISSUE NECK W/DYE: CPT | Performed by: RADIOLOGY

## 2024-03-18 RX ADMIN — IOHEXOL 75 ML: 350 INJECTION, SOLUTION INTRAVENOUS at 11:14

## 2024-03-20 ENCOUNTER — OFFICE VISIT (OUTPATIENT)
Dept: PRIMARY CARE | Facility: CLINIC | Age: 59
End: 2024-03-20
Payer: COMMERCIAL

## 2024-03-20 VITALS
SYSTOLIC BLOOD PRESSURE: 94 MMHG | DIASTOLIC BLOOD PRESSURE: 66 MMHG | WEIGHT: 194 LBS | HEIGHT: 68 IN | HEART RATE: 82 BPM | OXYGEN SATURATION: 97 % | BODY MASS INDEX: 29.4 KG/M2 | RESPIRATION RATE: 16 BRPM | TEMPERATURE: 97.3 F

## 2024-03-20 DIAGNOSIS — E66.3 OVERWEIGHT WITH BODY MASS INDEX (BMI) OF 29 TO 29.9 IN ADULT: ICD-10-CM

## 2024-03-20 DIAGNOSIS — M54.2 NECK PAIN: ICD-10-CM

## 2024-03-20 DIAGNOSIS — E03.9 ACQUIRED HYPOTHYROIDISM: Primary | ICD-10-CM

## 2024-03-20 PROCEDURE — 3008F BODY MASS INDEX DOCD: CPT | Performed by: FAMILY MEDICINE

## 2024-03-20 PROCEDURE — 1036F TOBACCO NON-USER: CPT | Performed by: FAMILY MEDICINE

## 2024-03-20 PROCEDURE — 99213 OFFICE O/P EST LOW 20 MIN: CPT | Performed by: FAMILY MEDICINE

## 2024-03-20 RX ORDER — PHENTERMINE HYDROCHLORIDE 37.5 MG/1
37.5 TABLET ORAL
Qty: 30 TABLET | Refills: 0 | Status: SHIPPED | OUTPATIENT
Start: 2024-03-20 | End: 2024-04-17 | Stop reason: SDUPTHER

## 2024-03-20 ASSESSMENT — ANXIETY QUESTIONNAIRES
5. BEING SO RESTLESS THAT IT IS HARD TO SIT STILL: NOT AT ALL
4. TROUBLE RELAXING: NOT AT ALL
GAD7 TOTAL SCORE: 0
6. BECOMING EASILY ANNOYED OR IRRITABLE: NOT AT ALL
3. WORRYING TOO MUCH ABOUT DIFFERENT THINGS: NOT AT ALL
7. FEELING AFRAID AS IF SOMETHING AWFUL MIGHT HAPPEN: NOT AT ALL
IF YOU CHECKED OFF ANY PROBLEMS ON THIS QUESTIONNAIRE, HOW DIFFICULT HAVE THESE PROBLEMS MADE IT FOR YOU TO DO YOUR WORK, TAKE CARE OF THINGS AT HOME, OR GET ALONG WITH OTHER PEOPLE: NOT DIFFICULT AT ALL
2. NOT BEING ABLE TO STOP OR CONTROL WORRYING: NOT AT ALL
1. FEELING NERVOUS, ANXIOUS, OR ON EDGE: NOT AT ALL

## 2024-03-20 ASSESSMENT — ENCOUNTER SYMPTOMS
DEPRESSION: 0
LOSS OF SENSATION IN FEET: 0
OCCASIONAL FEELINGS OF UNSTEADINESS: 0

## 2024-03-20 ASSESSMENT — PATIENT HEALTH QUESTIONNAIRE - PHQ9
SUM OF ALL RESPONSES TO PHQ9 QUESTIONS 1 & 2: 0
1. LITTLE INTEREST OR PLEASURE IN DOING THINGS: NOT AT ALL
2. FEELING DOWN, DEPRESSED OR HOPELESS: NOT AT ALL

## 2024-03-20 NOTE — PROGRESS NOTES
"Subjective   Patient ID: Malathi Castellanos is a 58 y.o. female who presents for Hypothyroidism. I last saw the patient 2/22/2024.     HPI   Patient has no medical complaints today for rooming MA.     Printed Script.     She is doing well on Phentermine. She lost 12 lbs in her first month with the medication.     Review of Systems  Except positives as noted in the CC & HPI      Constitutional: Denies fevers, chills, night sweats, fatigue, weight changes, change in appetite    Eyes: Denies blurry vision, double vision    ENT: Denies otalgia, trouble hearing, tinnitus, vertigo, nasal congestion, rhinorrhea, sore throat    Neck: Denies swelling, masses    Cardiovascular: Denies chest pain, palpitations, edema, orthopnea, syncope    Respiratory: Denies dyspnea, cough, wheezing, postural nocturnal dyspnea    Gastrointestinal: Denies abdominal pain, nausea, vomiting, diarrhea, constipation, melena, hematochezia    Genitourinary: Denies dysuria, hematuria, frequency, urgency    Musculoskeletal: Denies back pain, neck pain, arthralgias, myalgias    Integumentary: Denies skin lesions, rashes, masses    Neurological: Denies dizziness, headaches, confusion, limb weakness, paresthesias, syncope, convulsions    Psychiatric: Denies depression, anxiety, homicidal ideations, suicidal ideations, sleep disturbances    Endocrine: Denies polyphagia, polydipsia, polyuria, weakness, hair thinning, heat intolerance, cold intolerance, weight changes    Heme/Lymph: Denies easy bruising, easy bleeding, swollen glands    Objective   BP 94/66 (BP Location: Right arm, Patient Position: Sitting)   Pulse 82   Temp 36.3 °C (97.3 °F)   Resp 16   Ht 1.727 m (5' 8\")   Wt 88 kg (194 lb)   SpO2 97%   BMI 29.50 kg/m²     Physical Exam  94/66 on recheck of BP in the right arm.     Gen. Appearance - well-developed, well-nourished, 58 y.o., White female in no acute distress.     Skin - warm, pink and dry without rash or concerning lesions.     Mental " Status - alert and oriented times 3. Normal mood and affect appropriate to mood.     Neck - supple without lymphadenopathy. Carotid pulses are normal without bruits. Thyroid is normal in midline without nodules. Mild tenderness to palpation of the right lateral neck, near the angle of the jaw. No masses palpated.     Chest - lungs are clear to auscultation without rales, rhonchi or wheezes.    Heart - regular, rate, and rhythm without murmurs, rubs or gallops.     Extremities - no cyanosis, clubbing or edema. Pedal pulses are 2+ normal at the dorsalis pedis and posterior tibial pulses bilaterally.     Neurological - cranial nerves II through XII are grossly intact. Motor strength 5/5 at all fours.     Results  Reviewed CT soft tissue neck results from 3/18/2024.      Assessment/Plan   1. Acquired hypothyroidism  [facility] - PCP - Established    Follow [facility] - PCP - Established      2. Neck pain        3. Overweight with body mass index (BMI) of 29 to 29.9 in adult  phentermine (Adipex-P) 37.5 mg tablet      Patient to continue current medications (with any exceptions as noted) and diet. Follow-up in 1 month(s) otherwise as needed.      Patient was given refill(s) on:   Phentermine 37.5 mg, TAKE 1 TAB BY MOUTH DAILY      Rx(s) printed.     Patient is to follow up with Dr. Khan as scheduled. She was referred to Dr. Pham for possible neck injections.     Discussed possible treatment with Gabapentin or Duloxetine to see if that may help with her pain prior to injections.       Scribe Attestation  By signing my name below, ICynthia Scribe   attest that this documentation has been prepared under the direction and in the presence of Allan Tubbs MD.

## 2024-03-20 NOTE — PATIENT INSTRUCTIONS
Patient to continue current medications (with any exceptions as noted) and diet. Follow-up in 1 month(s) otherwise as needed.      Patient was given refill(s) on:   Phentermine 37.5 mg, TAKE 1 TAB BY MOUTH DAILY      Rx(s) printed.

## 2024-04-17 ENCOUNTER — OFFICE VISIT (OUTPATIENT)
Dept: PRIMARY CARE | Facility: CLINIC | Age: 59
End: 2024-04-17
Payer: COMMERCIAL

## 2024-04-17 VITALS
TEMPERATURE: 97.2 F | RESPIRATION RATE: 16 BRPM | HEART RATE: 84 BPM | OXYGEN SATURATION: 99 % | BODY MASS INDEX: 29.35 KG/M2 | SYSTOLIC BLOOD PRESSURE: 106 MMHG | DIASTOLIC BLOOD PRESSURE: 74 MMHG | WEIGHT: 193 LBS

## 2024-04-17 DIAGNOSIS — E53.8 VITAMIN B12 DEFICIENCY: ICD-10-CM

## 2024-04-17 DIAGNOSIS — E66.3 OVERWEIGHT WITH BODY MASS INDEX (BMI) OF 29 TO 29.9 IN ADULT: ICD-10-CM

## 2024-04-17 DIAGNOSIS — M54.2 NECK PAIN: ICD-10-CM

## 2024-04-17 DIAGNOSIS — E03.9 ACQUIRED HYPOTHYROIDISM: ICD-10-CM

## 2024-04-17 DIAGNOSIS — J84.10 LUNG GRANULOMA (MULTI): ICD-10-CM

## 2024-04-17 DIAGNOSIS — S46.811D TRAPEZIUS STRAIN, RIGHT, SUBSEQUENT ENCOUNTER: Primary | ICD-10-CM

## 2024-04-17 PROBLEM — R42 DIZZINESS: Status: RESOLVED | Noted: 2023-05-30 | Resolved: 2024-04-17

## 2024-04-17 PROCEDURE — 3008F BODY MASS INDEX DOCD: CPT | Performed by: FAMILY MEDICINE

## 2024-04-17 PROCEDURE — 99213 OFFICE O/P EST LOW 20 MIN: CPT | Performed by: FAMILY MEDICINE

## 2024-04-17 RX ORDER — CYANOCOBALAMIN 1000 UG/ML
INJECTION, SOLUTION INTRAMUSCULAR; SUBCUTANEOUS
Qty: 10 ML | Refills: 0 | Status: SHIPPED | OUTPATIENT
Start: 2024-04-17 | End: 2025-04-17

## 2024-04-17 RX ORDER — PHENTERMINE HYDROCHLORIDE 37.5 MG/1
37.5 TABLET ORAL
Qty: 30 TABLET | Refills: 0 | Status: SHIPPED | OUTPATIENT
Start: 2024-04-17 | End: 2024-05-08 | Stop reason: SDUPTHER

## 2024-04-17 RX ORDER — CYCLOBENZAPRINE HCL 10 MG
10 TABLET ORAL NIGHTLY PRN
Qty: 30 TABLET | Refills: 1 | Status: SHIPPED | OUTPATIENT
Start: 2024-04-17 | End: 2024-05-21 | Stop reason: SDUPTHER

## 2024-04-17 NOTE — PROGRESS NOTES
Subjective   Patient ID: Malathi Castellanos is a 59 y.o. female who presents for Follow-up. I last saw the patient 3/20/2024.     HPI   Patient has no medical concerns for rooming MA.     She states that her neck is still bothering her. She is wondering if she could be referred for massage therapy. She has tried OTC Tylenol with no relief. She is asking for a muscle relaxer for bedtime as well.     She is doing well on Phentermine, she has one month left.     Review of Systems  Except positives as noted in the CC & HPI      Constitutional: Denies fevers, chills, night sweats, fatigue, weight changes, change in appetite    Eyes: Denies blurry vision, double vision    ENT: Denies otalgia, trouble hearing, tinnitus, vertigo, nasal congestion, rhinorrhea, sore throat    Neck: Denies swelling, masses    Cardiovascular: Denies chest pain, palpitations, edema, orthopnea, syncope    Respiratory: Denies dyspnea, cough, wheezing, postural nocturnal dyspnea    Gastrointestinal: Denies abdominal pain, nausea, vomiting, diarrhea, constipation, melena, hematochezia    Genitourinary: Denies dysuria, hematuria, frequency, urgency    Musculoskeletal: Denies back pain, arthralgias, myalgias    Integumentary: Denies skin lesions, rashes, masses    Neurological: Denies dizziness, headaches, confusion, limb weakness, paresthesias, syncope, convulsions    Psychiatric: Denies depression, anxiety, homicidal ideations, suicidal ideations, sleep disturbances    Endocrine: Denies polyphagia, polydipsia, polyuria, weakness, hair thinning, heat intolerance, cold intolerance, weight changes    Heme/Lymph: Denies easy bruising, easy bleeding, swollen glands    Objective   /74 (BP Location: Right arm, Patient Position: Sitting)   Pulse 84   Temp 36.2 °C (97.2 °F)   Resp 16   Wt 87.5 kg (193 lb)   SpO2 99%   BMI 29.35 kg/m²     Physical Exam  106/74 on recheck of BP in the right arm.     Gen. Appearance - well-developed, well-nourished, 59  y.o., White female in no acute distress.     Skin - warm, pink and dry without rash or concerning lesions.     Mental Status - alert and oriented times 3. Normal mood and affect appropriate to mood.     Neck - supple without lymphadenopathy. Carotid pulses are normal without bruits. Thyroid is normal in midline without nodules. Mild tenderness to palpation with induration in the mid trapezius bilaterally.     Chest - lungs are clear to auscultation without rales, rhonchi or wheezes.    Heart - regular, rate, and rhythm without murmurs, rubs or gallops.     Extremities - no cyanosis, clubbing or edema. Pedal pulses are 2+ normal at the dorsalis pedis and posterior tibial pulses bilaterally.     Neurological - cranial nerves II through XII are grossly intact. Motor strength 5/5 at all fours.     Assessment/Plan   1. Trapezius strain, right, subsequent encounter  Referral to [employer] [facility]    cyclobenzaprine (Flexeril) 10 mg tablet      2. Neck pain        3. Acquired hypothyroidism  Follow Up [facility] - PCP - Established      4. Vitamin B12 deficiency  cyanocobalamin (Vitamin B-12) 1,000 mcg/mL injection      5. Overweight with body mass index (BMI) of 29 to 29.9 in adult  phentermine (Adipex-P) 37.5 mg tablet      6. Lung granuloma (Multi)        Patient to continue current medications (with any exceptions as noted) and diet. Follow-up in 1 month(s) otherwise as needed.      Patient was started on:   Cyclobenzaprine HCl 10 mg, TAKE 1 TAB BY MOUTH AT BEDTIME AS NEEDED     Patient was given refill(s) on:   Cyanocobalamin 1000 mcg/mL,   Phentermine 37.5 mg, TAKE 1 TAB BY MOUTH DAILY      Rx(s) sent to pharmacy.     Refer patient to [facility] [facility] for massage therapy for further evaluation and treatment of neck pain.         Scribe Attestation  By signing my name below, ICynthia Scribe   attest that this documentation has been prepared under the direction and in the presence of  Allan Tubbs MD.

## 2024-04-17 NOTE — PATIENT INSTRUCTIONS
Patient to continue current medications (with any exceptions as noted) and diet. Follow-up in 1 month(s) otherwise as needed.      Patient was started on:   Cyclobenzaprine HCl 10 mg, TAKE 1 TAB BY MOUTH AT BEDTIME AS NEEDED     Patient was given refill(s) on:   Cyanocobalamin 1000 mcg/mL,   Phentermine 37.5 mg, TAKE 1 TAB BY MOUTH DAILY      Rx(s) sent to pharmacy.     Refer patient to massage therapy for further evaluation and treatment of neck pain.

## 2024-04-18 NOTE — ASSESSMENT & PLAN NOTE
Lung granuloma is stable.  Patient to continue with current medications and treatment plan.  Follow-up at least annually.

## 2024-05-08 ENCOUNTER — OFFICE VISIT (OUTPATIENT)
Dept: PRIMARY CARE | Facility: CLINIC | Age: 59
End: 2024-05-08
Payer: COMMERCIAL

## 2024-05-08 VITALS
OXYGEN SATURATION: 96 % | RESPIRATION RATE: 16 BRPM | DIASTOLIC BLOOD PRESSURE: 76 MMHG | TEMPERATURE: 97.6 F | WEIGHT: 190 LBS | HEIGHT: 68 IN | BODY MASS INDEX: 28.79 KG/M2 | SYSTOLIC BLOOD PRESSURE: 114 MMHG | HEART RATE: 78 BPM

## 2024-05-08 DIAGNOSIS — H81.03 MENIERE'S DISEASE OF BOTH EARS: ICD-10-CM

## 2024-05-08 DIAGNOSIS — E66.3 OVERWEIGHT WITH BODY MASS INDEX (BMI) OF 28 TO 28.9 IN ADULT: ICD-10-CM

## 2024-05-08 DIAGNOSIS — Z13.220 LIPID SCREENING: ICD-10-CM

## 2024-05-08 DIAGNOSIS — E03.9 ACQUIRED HYPOTHYROIDISM: Primary | ICD-10-CM

## 2024-05-08 PROBLEM — M54.2 NECK PAIN: Status: RESOLVED | Noted: 2023-05-30 | Resolved: 2024-05-08

## 2024-05-08 PROBLEM — R07.0 THROAT PAIN: Status: RESOLVED | Noted: 2024-03-15 | Resolved: 2024-05-08

## 2024-05-08 PROCEDURE — 3008F BODY MASS INDEX DOCD: CPT | Performed by: FAMILY MEDICINE

## 2024-05-08 PROCEDURE — 1036F TOBACCO NON-USER: CPT | Performed by: FAMILY MEDICINE

## 2024-05-08 PROCEDURE — 99213 OFFICE O/P EST LOW 20 MIN: CPT | Performed by: FAMILY MEDICINE

## 2024-05-08 RX ORDER — PHENTERMINE HYDROCHLORIDE 37.5 MG/1
37.5 TABLET ORAL
Qty: 30 TABLET | Refills: 0 | Status: SHIPPED | OUTPATIENT
Start: 2024-05-08 | End: 2024-06-07

## 2024-05-08 NOTE — PATIENT INSTRUCTIONS
Patient to continue current medications (with any exceptions as noted) and diet. Follow-up in 1 month(s) otherwise as needed.      Patient is to return for fasting CBC, CMP, lipid panel, TSH labs at their convenience prior to her next appointment. Fasting is nothing to eat or drink except water or black coffee for 8-12 hours. Will call patient with results when available.     Patient was given refill(s) on:   Phentermine 37.5 mg, TAKE 1 TAB BY MOUTH DAILY     Rx(s) sent to pharmacy.

## 2024-05-08 NOTE — PROGRESS NOTES
"Chief Complaint   Patient presents with    Follow-up     HPI: Malathi Castellanos is a 59 y.o. female presenting for follow-up of Follow-up  . I last saw the patient 4/17/2024.     Patient is taking medications as prescribed. She has no medical concerns for rooming MA.     She notes that she is doing really well on Phentermine and would like to stay on the medication. She has been on the medication for 3 months.     ROS  Except positives as noted in the CC & HPI   Constitutional: Denies fevers, chills, night sweats, fatigue, weight changes, change in appetite   Eyes: Denies blurry vision, double vision   ENT: Denies otalgia, trouble hearing, tinnitus, vertigo, nasal congestion, rhinorrhea, sore throat   Neck: Denies swelling, masses   Cardiovascular: Denies chest pain, palpitations, edema, orthopnea, syncope   Respiratory: Denies dyspnea, cough, wheezing, postural nocturnal dyspnea   Gastrointestinal: Denies abdominal pain, nausea, vomiting, diarrhea, constipation, melena, hematochezia   Genitourinary: Denies dysuria, hematuria, frequency, urgency   Musculoskeletal: Denies back pain, neck pain, arthralgias, myalgias   Integumentary: Denies skin lesions, rashes, masses   Neurological: Denies dizziness, headaches, confusion, limb weakness, paresthesias, syncope, convulsions   Psychiatric: Denies depression, anxiety, homicidal ideations, suicidal ideations, sleep disturbances   Endocrine: Denies polyphagia, polydipsia, polyuria, weakness, hair thinning, heat intolerance, cold intolerance, weight changes   Heme/Lymph: Denies easy bruising, easy bleeding, swollen glands     Vitals:    05/08/24 1147 05/08/24 1253   BP: 118/79 114/76   BP Location: Left arm Right arm   Patient Position:  Sitting   Pulse: 78    Resp: 16    Temp: 36.4 °C (97.6 °F)    SpO2: 96%    Weight: 86.2 kg (190 lb)    Height: 1.727 m (5' 8\")      PHYSICAL EXAM  114/76 on recheck of BP in the right arm.     Gen. Appearance - well-developed, well-nourished, 59 " y.o., White female in no acute distress.     Skin - warm, pink and dry without rash or concerning lesions.     Mental Status - alert and oriented times 3. Normal mood and affect appropriate to mood.     Neck - supple without lymphadenopathy. Carotid pulses are normal without bruits. Thyroid is normal in midline without nodules.     Chest - lungs are clear to auscultation without rales, rhonchi or wheezes.    Heart - regular, rate, and rhythm without murmurs, rubs or gallops.     Extremities - no cyanosis, clubbing or edema. Pedal pulses are 2+ normal at the dorsalis pedis and posterior tibial pulses bilaterally.     Neurological - cranial nerves II through XII are grossly intact. Motor strength 5/5 at all fours.     ASSESSMENT:  1. Acquired hypothyroidism  [facility] - PCP - Established    [facility] - PCP - Established    TSH with reflex to Free T4 if abnormal      2. Meniere's disease of both ears  Follow [facility] - PCP - Established      3. Overweight with body mass index (BMI) of 28 to 28.9 in adult  phentermine (Adipex-P) 37.5 mg tablet    CBC and Auto Differential    Comprehensive Metabolic Panel      4. Lipid screening  Lipid Panel      PLAN:  Patient to continue current medications (with any exceptions as noted) and diet. Follow-up in 1 month(s) otherwise as needed.      Patient is to return for fasting CBC, CMP, lipid panel, TSH labs at their convenience prior to her next appointment. Fasting is nothing to eat or drink except water or black coffee for 8-12 hours. Will call patient with results when available.     Patient was given refill(s) on:   Phentermine 37.5 mg, TAKE 1 TAB BY MOUTH DAILY     Rx(s) sent to pharmacy.     Plan to discontinue Phentermine when BMI is 27 or less.     Patient signed medication contract today.       Scribe Attestation  By signing my name below, ICynthia, Faizan   attest that this documentation has  been prepared under the direction and in the presence of Allan Tubbs MD.

## 2024-05-09 ENCOUNTER — TELEPHONE (OUTPATIENT)
Dept: PRIMARY CARE | Facility: CLINIC | Age: 59
End: 2024-05-09
Payer: COMMERCIAL

## 2024-05-09 NOTE — TELEPHONE ENCOUNTER
Prior Authorization for phentermine (Adipex-P) 37.5 mg tablet  has been  DENIED.     Denial letter scanned into media on 05.09.2024

## 2024-05-13 PROCEDURE — RXMED WILLOW AMBULATORY MEDICATION CHARGE

## 2024-05-15 ENCOUNTER — HOSPITAL ENCOUNTER (OUTPATIENT)
Dept: RADIOLOGY | Facility: CLINIC | Age: 59
Discharge: HOME | End: 2024-05-15
Payer: COMMERCIAL

## 2024-05-15 ENCOUNTER — OFFICE VISIT (OUTPATIENT)
Dept: ORTHOPEDIC SURGERY | Facility: CLINIC | Age: 59
End: 2024-05-15
Payer: COMMERCIAL

## 2024-05-15 DIAGNOSIS — M17.10 ARTHRITIS OF KNEE: ICD-10-CM

## 2024-05-15 DIAGNOSIS — S80.00XA CONTUSION OF KNEE, INITIAL ENCOUNTER: ICD-10-CM

## 2024-05-15 DIAGNOSIS — M17.10 ARTHRITIS OF KNEE: Primary | ICD-10-CM

## 2024-05-15 PROCEDURE — 73564 X-RAY EXAM KNEE 4 OR MORE: CPT | Mod: RIGHT SIDE | Performed by: INTERNAL MEDICINE

## 2024-05-15 PROCEDURE — 73564 X-RAY EXAM KNEE 4 OR MORE: CPT | Mod: RT

## 2024-05-15 PROCEDURE — 99214 OFFICE O/P EST MOD 30 MIN: CPT | Performed by: INTERNAL MEDICINE

## 2024-05-15 PROCEDURE — 3008F BODY MASS INDEX DOCD: CPT | Performed by: INTERNAL MEDICINE

## 2024-05-15 RX ORDER — OXYCODONE AND ACETAMINOPHEN 5; 325 MG/1; MG/1
1 TABLET ORAL EVERY 12 HOURS PRN
Qty: 14 TABLET | Refills: 0 | Status: SHIPPED | OUTPATIENT
Start: 2024-05-15 | End: 2024-05-21 | Stop reason: SDUPTHER

## 2024-05-15 NOTE — PROGRESS NOTES
Acute Injury New Patient Visit    CC:   Chief Complaint   Patient presents with    Right Knee - Pain     Rt knee pain   Xrays today  Foot got caught cutting grass       HPI: Malathi is a 59 y.o. female presents today for evaluation for acute right knee injury sustained yesterday after she fell while mowing grass. She is here for initial evaluation and x-rays.        Review of Systems   GENERAL: Negative for malaise, significant weight loss, fever  MUSCULOSKELETAL: See HPI  NEURO:  Negative for numbness / tingling     Past Medical History  Past Medical History:   Diagnosis Date    Abnormal finding of blood chemistry, unspecified     Abnormal blood chemistry    Infection following a procedure, other surgical site, initial encounter     Postoperative wound abscess    Liver disease, unspecified     Liver lesion, right lobe    Morbid (severe) obesity due to excess calories (Multi)     Morbid obesity    Nasal congestion     Head congestion    Other conditions influencing health status     Mass    Other specified disorders of breast     Acquired nipple deformity    Pain in left hip     Left hip pain    Pain in unspecified foot     Foot pain    Pain, unspecified     Tenderness    Personal history of malignant neoplasm of breast     History of breast cancer    Personal history of malignant neoplasm of breast     History of malignant neoplasm of breast    Personal history of other diseases of the circulatory system     History of hypertension    Personal history of other diseases of the digestive system     History of inflammatory bowel disease    Personal history of other diseases of the digestive system     History of ventral hernia    Personal history of other diseases of the musculoskeletal system and connective tissue     History of low back pain    Personal history of other diseases of the musculoskeletal system and connective tissue     History of radicular syndrome of lower extremity    Personal history of other  diseases of the musculoskeletal system and connective tissue     History of osteoporosis    Personal history of other diseases of the nervous system and sense organs     History of carpal tunnel syndrome    Personal history of other diseases of the nervous system and sense organs     History of sciatica    Personal history of other diseases of the respiratory system     History of sinusitis    Personal history of other endocrine, nutritional and metabolic disease     History of vitamin D deficiency    Personal history of other specified conditions     History of diarrhea    Personal history of other specified conditions     History of motion sickness    Radial head fracture 05/30/2023    Scapholunate dissociation of left wrist 05/30/2023    Strain of muscle, fascia and tendon of lower back, initial encounter     Lumbar strain    Strain of unspecified muscle, fascia and tendon at shoulder and upper arm level, right arm, initial encounter     Right shoulder strain    Unspecified abdominal hernia without obstruction or gangrene     Hernia    Unspecified sprain of left wrist, initial encounter     Sprain of left wrist    Vitamin B12 deficiency anemia, unspecified     Anemia, B12 deficiency       Medication review  Medication Documentation Review Audit       Reviewed by Megan Harmon MA (Medical Assistant) on 05/08/24 at 1144      Medication Order Taking? Sig Documenting Provider Last Dose Status   cyanocobalamin (Vitamin B-12) 1,000 mcg/mL injection [id number] Yes INJECT 1 ML INTO THE SHOULDER, THIGH, OR BUTTOCKS EVERY 30 DAYS Allan Tubbs MD Taking Active   cyclobenzaprine (Flexeril) 10 mg tablet [id number] Yes Take 1 tablet (10 mg) by mouth as needed at bedtime for muscle spasms. Allan Tubbs MD Taking Active   levothyroxine (Synthroid, Levoxyl) 50 mcg tablet [id number] Yes TAKE 1 TABLET BY MOUTH ONCE DAILY Allan Tubbs MD Taking Active   phentermine (Adipex-P) 37.5 mg tablet [id number] Yes Take 1 tablet  "(37.5 mg) by mouth once daily in the morning. Take before meals. Allan Tubbs MD Taking Active   triamterene-hydrochlorothiazid (Maxzide-25) 37.5-25 mg tablet [id number] Yes Take 1 tablet by mouth once daily. Allan Tubbs MD Taking Active                    Allergies  Allergies   Allergen Reactions    Prednisone Shortness of breath    Codeine Diarrhea and Other     Stomach Burning    Hydrocodone-Acetaminophen Unknown    Ibuprofen Unknown    Moxifloxacin Unknown and Hives     Pt states it caused cell disruption    Nsaids (Non-Steroidal Anti-Inflammatory Drug) Other    Other Itching    Promethazine Hives    Vancomycin Unknown     the patient reports that she had \"red man syndrome\" when taking Vancomycin       Social History  Social History     Socioeconomic History    Marital status:      Spouse name: Not on file    Number of children: Not on file    Years of education: Not on file    Highest education level: Not on file   Occupational History    Not on file   Tobacco Use    Smoking status: Never    Smokeless tobacco: Never   Substance and Sexual Activity    Alcohol use: Never    Drug use: Never    Sexual activity: Not on file   Other Topics Concern    Not on file   Social History Narrative    Not on file     Social Determinants of Health     Financial Resource Strain: Not on file   Food Insecurity: Not on file   Transportation Needs: Not on file   Physical Activity: Not on file   Stress: Not on file   Social Connections: Not on file   Intimate Partner Violence: Not on file   Housing Stability: Not on file       Surgical History  Past Surgical History:   Procedure Laterality Date    BELT ABDOMINOPLASTY  11/04/2014    Abdominoplasty    CT GUIDED PERCUTANEOUS BIOPSY LUNG  1/6/2020    CT GUIDED PERCUTANEOUS BIOPSY LUNG 1/6/2020 [facility]    GASTRIC BYPASS  11/04/2014    Gastric Surgery For Morbid Obesity Gastric Bypass    HERNIA REPAIR  11/04/2014    Hernia Repair    MASTECTOMY  11/04/2014    Breast " Surgery Mastectomy    OTHER SURGICAL HISTORY  11/04/2014    Breast Reconstruction With Implant Prosthesis Bilateral    OTHER SURGICAL HISTORY  11/04/2014    Breast Reconstruction With Tissue Expander Bilateral    OTHER SURGICAL HISTORY  12/05/2022    Cardiac catheterization    OTHER SURGICAL HISTORY  03/19/2019    Eye surgery    OTHER SURGICAL HISTORY  03/18/2019    Ear pressure equalization tube insertion    OTHER SURGICAL HISTORY  03/18/2019    Yris-en-Y gastric bypass    OTHER SURGICAL HISTORY  03/18/2019    Hand surgery    OTHER SURGICAL HISTORY  03/18/2019    Hysterectomy    OTHER SURGICAL HISTORY  04/15/2015    Breast Surgery Revision Of Reconstructed Left Breast    TUBAL LIGATION  11/04/2014    Tubal Ligation       Physical Exam:  GENERAL:  Patient is awake, alert, and oriented to person place and time.  Patient appears well nourished and well kept.  Affect Calm, Not Acutely Distressed.  HEENT:  Normocephalic, Atraumatic, EOMI  CARDIOVASCULAR:  Hemodynamically stable.  RESPIRATORY:  Normal respirations with unlabored breathing.  Extremity: Right knee examination shows skin abrasion on the anterior aspect of the right knee, there is no active bleeding or clinical signs of infection.  There is no erythema or warmth.  No effusion.  Can flex the right knee to 120 degrees with pain.  Full extension at 0 degrees.  Pain over the medial joint line.  Pain over the lateral joint line.  There is no pain over the patellar or quadricep tendon.  Pain over the proximal tibia.  No pain over the popliteal fossa.  Negative valgus stress test.  Negative varus stress test.  Negative Jose's test medially with no instability.  Negative Jose's test laterally with no instability.  Negative Lachman's test.  Patellar and quadricep mechanism intact.  Negative anterior and posterior drawer test.  Negative patellar apprehension test.  Distal pulses are palpable, neurovascularly intact.  Walking with no significant antalgic  gait.      Diagnostics: X-rays reviewed      Procedure: None    Assessment: Right knee sprain and contusion    Plan: Malathi presents today for initial evaluation for acute right knee injury sustained yesterday after a fall. X-rays showed no obvious fractures. She has a clinically stable knee. Reassurance was given to the patient.  We discussed proper wound care with the patient, icing and anti-inflammatories Percocet for pain and dressing provided.  She is off work for 1 week until follow-up visit as she is unable perform her duties at work.  If no improvement, possible further imaging such as MRI.    Orders Placed This Encounter    XR knee right 4+ views      At the conclusion of the visit there were no further questions by the patient/family regarding their plan of care.  Patient was instructed to call or return with any issues, questions, or concerns regarding their injury and/or treatment plan described above.     05/15/24 at 9:33 AM - Melony Sterling MD  Scribe Attestation  By signing my name below, I, Carroll Chano, Scribelizabeth   attest that this documentation has been prepared under the direction and in the presence of Melony Sterling MD.    Office: [phone]    This note was prepared using voice recognition software.  The details of this note are correct and have been reviewed, and corrected to the best of my ability.  Some grammatical errors may persist related to the Dragon software.

## 2024-05-15 NOTE — LETTER
May 15, 2024     Patient: Malathi Castellanos   YOB: 1965   Date of Visit: 5/15/2024       To Whom It May Concern:    It is my medical opinion that Malathi Castellanos should remain out of work until 5/22/24 . She may return 5/23/24.     If you have any questions or concerns, please don't hesitate to call.         Sincerely,        Melony Sterling MD    CC: No Recipients

## 2024-05-17 ENCOUNTER — PHARMACY VISIT (OUTPATIENT)
Dept: PHARMACY | Facility: CLINIC | Age: 59
End: 2024-05-17
Payer: COMMERCIAL

## 2024-05-21 ENCOUNTER — OFFICE VISIT (OUTPATIENT)
Dept: ORTHOPEDIC SURGERY | Facility: CLINIC | Age: 59
End: 2024-05-21
Payer: COMMERCIAL

## 2024-05-21 DIAGNOSIS — S46.811D TRAPEZIUS STRAIN, RIGHT, SUBSEQUENT ENCOUNTER: ICD-10-CM

## 2024-05-21 DIAGNOSIS — S83.90XA SPRAIN OF KNEE, INITIAL ENCOUNTER: ICD-10-CM

## 2024-05-21 DIAGNOSIS — S80.00XA CONTUSION OF KNEE, INITIAL ENCOUNTER: ICD-10-CM

## 2024-05-21 DIAGNOSIS — M17.10 ARTHRITIS OF KNEE: ICD-10-CM

## 2024-05-21 PROCEDURE — 99214 OFFICE O/P EST MOD 30 MIN: CPT | Performed by: INTERNAL MEDICINE

## 2024-05-21 PROCEDURE — 3008F BODY MASS INDEX DOCD: CPT | Performed by: INTERNAL MEDICINE

## 2024-05-21 RX ORDER — OXYCODONE AND ACETAMINOPHEN 5; 325 MG/1; MG/1
1 TABLET ORAL EVERY 12 HOURS PRN
Qty: 10 TABLET | Refills: 0 | Status: SHIPPED | OUTPATIENT
Start: 2024-05-21 | End: 2024-05-26

## 2024-05-21 RX ORDER — CYCLOBENZAPRINE HCL 10 MG
10 TABLET ORAL NIGHTLY PRN
Qty: 30 TABLET | Refills: 1 | Status: SHIPPED | OUTPATIENT
Start: 2024-05-21 | End: 2024-07-20

## 2024-05-21 NOTE — PROGRESS NOTES
CC:   No chief complaint on file.      HPI: Malathi is a 59 y.o. female presents today for reevaluation for right knee sprain and contusion. She states that she has had minimal improvement. She notes pain and swelling and difficulty weight bearing on the right knee.         Review of Systems   GENERAL: Negative for malaise, significant weight loss, fever  MUSCULOSKELETAL: See HPI  NEURO:  Negative for numbness / tingling     Past Medical History  Past Medical History:   Diagnosis Date    Abnormal finding of blood chemistry, unspecified     Abnormal blood chemistry    Infection following a procedure, other surgical site, initial encounter     Postoperative wound abscess    Liver disease, unspecified     Liver lesion, right lobe    Morbid (severe) obesity due to excess calories (Multi)     Morbid obesity    Nasal congestion     Head congestion    Other conditions influencing health status     Mass    Other specified disorders of breast     Acquired nipple deformity    Pain in left hip     Left hip pain    Pain in unspecified foot     Foot pain    Pain, unspecified     Tenderness    Personal history of malignant neoplasm of breast     History of breast cancer    Personal history of malignant neoplasm of breast     History of malignant neoplasm of breast    Personal history of other diseases of the circulatory system     History of hypertension    Personal history of other diseases of the digestive system     History of inflammatory bowel disease    Personal history of other diseases of the digestive system     History of ventral hernia    Personal history of other diseases of the musculoskeletal system and connective tissue     History of low back pain    Personal history of other diseases of the musculoskeletal system and connective tissue     History of radicular syndrome of lower extremity    Personal history of other diseases of the musculoskeletal system and connective tissue     History of osteoporosis     Personal history of other diseases of the nervous system and sense organs     History of carpal tunnel syndrome    Personal history of other diseases of the nervous system and sense organs     History of sciatica    Personal history of other diseases of the respiratory system     History of sinusitis    Personal history of other endocrine, nutritional and metabolic disease     History of vitamin D deficiency    Personal history of other specified conditions     History of diarrhea    Personal history of other specified conditions     History of motion sickness    Radial head fracture 05/30/2023    Scapholunate dissociation of left wrist 05/30/2023    Strain of muscle, fascia and tendon of lower back, initial encounter     Lumbar strain    Strain of unspecified muscle, fascia and tendon at shoulder and upper arm level, right arm, initial encounter     Right shoulder strain    Unspecified abdominal hernia without obstruction or gangrene     Hernia    Unspecified sprain of left wrist, initial encounter     Sprain of left wrist    Vitamin B12 deficiency anemia, unspecified     Anemia, B12 deficiency       Medication review  Medication Documentation Review Audit       Reviewed by Megan Harmon MA (Medical Assistant) on 05/08/24 at 1144      Medication Order Taking? Sig Documenting Provider Last Dose Status   cyanocobalamin (Vitamin B-12) 1,000 mcg/mL injection [id number] Yes INJECT 1 ML INTO THE SHOULDER, THIGH, OR BUTTOCKS EVERY 30 DAYS Allan Tubbs MD Taking Active   cyclobenzaprine (Flexeril) 10 mg tablet [id number] Yes Take 1 tablet (10 mg) by mouth as needed at bedtime for muscle spasms. Allan Tubbs MD Taking Active   levothyroxine (Synthroid, Levoxyl) 50 mcg tablet [id number] Yes TAKE 1 TABLET BY MOUTH ONCE DAILY Allan Tubbs MD Taking Active   phentermine (Adipex-P) 37.5 mg tablet [id number] Yes Take 1 tablet (37.5 mg) by mouth once daily in the morning. Take before meals. Allan Tubbs MD Taking  "Active   triamterene-hydrochlorothiazid (Maxzide-25) 37.5-25 mg tablet [id number] Yes Take 1 tablet by mouth once daily. Allan Tubbs MD Taking Active                    Allergies  Allergies   Allergen Reactions    Prednisone Shortness of breath    Codeine Diarrhea and Other     Stomach Burning    Hydrocodone-Acetaminophen Unknown    Ibuprofen Unknown    Moxifloxacin Unknown and Hives     Pt states it caused cell disruption    Nsaids (Non-Steroidal Anti-Inflammatory Drug) Other    Other Itching    Promethazine Hives    Vancomycin Unknown     the patient reports that she had \"red man syndrome\" when taking Vancomycin       Social History  Social History     Socioeconomic History    Marital status:      Spouse name: Not on file    Number of children: Not on file    Years of education: Not on file    Highest education level: Not on file   Occupational History    Not on file   Tobacco Use    Smoking status: Never    Smokeless tobacco: Never   Substance and Sexual Activity    Alcohol use: Never    Drug use: Never    Sexual activity: Not on file   Other Topics Concern    Not on file   Social History Narrative    Not on file     Social Determinants of Health     Financial Resource Strain: Not on file   Food Insecurity: Not on file   Transportation Needs: Not on file   Physical Activity: Not on file   Stress: Not on file   Social Connections: Not on file   Intimate Partner Violence: Not on file   Housing Stability: Not on file       Surgical History  Past Surgical History:   Procedure Laterality Date    BELT ABDOMINOPLASTY  11/04/2014    Abdominoplasty    CT GUIDED PERCUTANEOUS BIOPSY LUNG  1/6/2020    CT GUIDED PERCUTANEOUS BIOPSY LUNG 1/6/2020 [facility]    GASTRIC BYPASS  11/04/2014    Gastric Surgery For Morbid Obesity Gastric Bypass    HERNIA REPAIR  11/04/2014    Hernia Repair    MASTECTOMY  11/04/2014    Breast Surgery Mastectomy    OTHER SURGICAL HISTORY  11/04/2014    Breast Reconstruction With " Implant Prosthesis Bilateral    OTHER SURGICAL HISTORY  11/04/2014    Breast Reconstruction With Tissue Expander Bilateral    OTHER SURGICAL HISTORY  12/05/2022    Cardiac catheterization    OTHER SURGICAL HISTORY  03/19/2019    Eye surgery    OTHER SURGICAL HISTORY  03/18/2019    Ear pressure equalization tube insertion    OTHER SURGICAL HISTORY  03/18/2019    Yris-en-Y gastric bypass    OTHER SURGICAL HISTORY  03/18/2019    Hand surgery    OTHER SURGICAL HISTORY  03/18/2019    Hysterectomy    OTHER SURGICAL HISTORY  04/15/2015    Breast Surgery Revision Of Reconstructed Left Breast    TUBAL LIGATION  11/04/2014    Tubal Ligation       Physical Exam:  GENERAL:  Patient is awake, alert, and oriented to person place and time.  Patient appears well nourished and well kept.  Affect Calm, Not Acutely Distressed.  HEENT:  Normocephalic, Atraumatic, EOMI  CARDIOVASCULAR:  Hemodynamically stable.  RESPIRATORY:  Normal respirations with unlabored breathing.  Extremity: Right knee examination shows skin abrasion on the anterior aspect of the right knee with sloughing necrotic tissue, there is no active bleeding or clinical signs of infection.  There is no erythema or warmth.  No effusion.  Can flex the right knee to 120 degrees with pain.  Full extension at 0 degrees.  Pain over the medial joint line.  Pain over the lateral joint line.  There is no pain over the patellar or quadricep tendon.  Pain over the proximal tibia.  No pain over the popliteal fossa.  Negative valgus stress test.  Negative varus stress test.  Positive Jose's test medially with instability.  Positive Jose's test laterally with no instability.  Negative Lachman's test.  Patellar and quadricep mechanism intact.  Negative anterior and posterior drawer test.  Negative patellar apprehension test.  Distal pulses are palpable, neurovascularly intact.  Walking with no significant antalgic gait.       Diagnostics: None today  XR knee right 4+  views  Interpreted By:  Melony Mccarthy,   STUDY:  XR KNEE RIGHT 4+ VIEWS, 5/15/2024 9:22 am      INDICATION:  Signs/Symptoms:pain      ACCESSION NUMBER(S):  [id number]      ORDERING CLINICIAN:  MELONY MCCARTHY      FINDINGS:  Right knee x-rays four views AP, lateral, tunnel and sunrise view: No  acute fractures, no dislocation. Mild degenerative changes.          Signed by: Melony Mccarthy 5/15/2024 10:36 AM  Dictation workstation:   [id number]        Procedure: None    Assessment:   1.  Right knee sprain and contusion  2.  Right knee internal derangement    Plan: Malathi resents today for reevaluation for right knee sprain and contusion. She clinically has not improved. We recommended MRI of the right knee for preoperative planning. Also sent her cyclobenzaprine for muscle spasms, with refill on her pain medication [facility] report was reviewed. She will follow-up after the MRI.  She will call if any work restrictions modifications.    No orders of the defined types were placed in this encounter.     At the conclusion of the visit there were no further questions by the patient/family regarding their plan of care.  Patient was instructed to call or return with any issues, questions, or concerns regarding their injury and/or treatment plan described above.     05/21/24 at 11:01 AM - Melony Mccarthy MD    Office: [phone]    This note was prepared using voice recognition software.  The details of this note are correct and have been reviewed, and corrected to the best of my ability.  Some grammatical errors may persist related to the Dragon software.

## 2024-05-31 PROCEDURE — 73721 MRI JNT OF LWR EXTRE W/O DYE: CPT | Mod: RIGHT SIDE | Performed by: RADIOLOGY

## 2024-06-04 ENCOUNTER — OFFICE VISIT (OUTPATIENT)
Dept: ORTHOPEDIC SURGERY | Facility: CLINIC | Age: 59
End: 2024-06-04
Payer: COMMERCIAL

## 2024-06-04 DIAGNOSIS — S46.811D TRAPEZIUS STRAIN, RIGHT, SUBSEQUENT ENCOUNTER: ICD-10-CM

## 2024-06-04 DIAGNOSIS — S83.90XA SPRAIN OF KNEE, INITIAL ENCOUNTER: ICD-10-CM

## 2024-06-04 PROCEDURE — L1812 KO ELASTIC W/JOINTS PRE OTS: HCPCS | Performed by: ORTHOPAEDIC SURGERY

## 2024-06-04 PROCEDURE — 27530 TREAT KNEE FRACTURE: CPT | Performed by: ORTHOPAEDIC SURGERY

## 2024-06-04 PROCEDURE — 99214 OFFICE O/P EST MOD 30 MIN: CPT | Performed by: ORTHOPAEDIC SURGERY

## 2024-06-04 PROCEDURE — 3008F BODY MASS INDEX DOCD: CPT | Performed by: ORTHOPAEDIC SURGERY

## 2024-06-04 PROCEDURE — 99213 OFFICE O/P EST LOW 20 MIN: CPT | Performed by: ORTHOPAEDIC SURGERY

## 2024-06-04 RX ORDER — OXYCODONE AND ACETAMINOPHEN 5; 325 MG/1; MG/1
1 TABLET ORAL EVERY 6 HOURS PRN
Qty: 28 TABLET | Refills: 0 | Status: SHIPPED | OUTPATIENT
Start: 2024-06-04 | End: 2024-06-11

## 2024-06-04 NOTE — PROGRESS NOTES
History of Present Illness:  Patient presents for evaluation of her right tibia.  The patient sustained an acute injury and notes pain and swelling.    The pain is sharp in nature, severe, worse with movement and better with rest.    She has been ambulating on that leg. She works in the emergency department.    Past Medical History:   Diagnosis Date    Abnormal finding of blood chemistry, unspecified     Abnormal blood chemistry    Infection following a procedure, other surgical site, initial encounter     Postoperative wound abscess    Liver disease, unspecified     Liver lesion, right lobe    Morbid (severe) obesity due to excess calories (Multi)     Morbid obesity    Nasal congestion     Head congestion    Other conditions influencing health status     Mass    Other specified disorders of breast     Acquired nipple deformity    Pain in left hip     Left hip pain    Pain in unspecified foot     Foot pain    Pain, unspecified     Tenderness    Personal history of malignant neoplasm of breast     History of breast cancer    Personal history of malignant neoplasm of breast     History of malignant neoplasm of breast    Personal history of other diseases of the circulatory system     History of hypertension    Personal history of other diseases of the digestive system     History of inflammatory bowel disease    Personal history of other diseases of the digestive system     History of ventral hernia    Personal history of other diseases of the musculoskeletal system and connective tissue     History of low back pain    Personal history of other diseases of the musculoskeletal system and connective tissue     History of radicular syndrome of lower extremity    Personal history of other diseases of the musculoskeletal system and connective tissue     History of osteoporosis    Personal history of other diseases of the nervous system and sense organs     History of carpal tunnel syndrome    Personal history of other  diseases of the nervous system and sense organs     History of sciatica    Personal history of other diseases of the respiratory system     History of sinusitis    Personal history of other endocrine, nutritional and metabolic disease     History of vitamin D deficiency    Personal history of other specified conditions     History of diarrhea    Personal history of other specified conditions     History of motion sickness    Radial head fracture 05/30/2023    Scapholunate dissociation of left wrist 05/30/2023    Strain of muscle, fascia and tendon of lower back, initial encounter     Lumbar strain    Strain of unspecified muscle, fascia and tendon at shoulder and upper arm level, right arm, initial encounter     Right shoulder strain    Unspecified abdominal hernia without obstruction or gangrene     Hernia    Unspecified sprain of left wrist, initial encounter     Sprain of left wrist    Vitamin B12 deficiency anemia, unspecified     Anemia, B12 deficiency     Past Surgical History:   Procedure Laterality Date    BELT ABDOMINOPLASTY  11/04/2014    Abdominoplasty    CT GUIDED PERCUTANEOUS BIOPSY LUNG  1/6/2020    CT GUIDED PERCUTANEOUS BIOPSY LUNG 1/6/2020 [facility]    GASTRIC BYPASS  11/04/2014    Gastric Surgery For Morbid Obesity Gastric Bypass    HERNIA REPAIR  11/04/2014    Hernia Repair    MASTECTOMY  11/04/2014    Breast Surgery Mastectomy    OTHER SURGICAL HISTORY  11/04/2014    Breast Reconstruction With Implant Prosthesis Bilateral    OTHER SURGICAL HISTORY  11/04/2014    Breast Reconstruction With Tissue Expander Bilateral    OTHER SURGICAL HISTORY  12/05/2022    Cardiac catheterization    OTHER SURGICAL HISTORY  03/19/2019    Eye surgery    OTHER SURGICAL HISTORY  03/18/2019    Ear pressure equalization tube insertion    OTHER SURGICAL HISTORY  03/18/2019    Yris-en-Y gastric bypass    OTHER SURGICAL HISTORY  03/18/2019    Hand surgery    OTHER SURGICAL HISTORY  03/18/2019    Hysterectomy    OTHER  SURGICAL HISTORY  04/15/2015    Breast Surgery Revision Of Reconstructed Left Breast    TUBAL LIGATION  11/04/2014    Tubal Ligation       Current Outpatient Medications:     cyanocobalamin (Vitamin B-12) 1,000 mcg/mL injection, INJECT 1 ML INTO THE SHOULDER, THIGH, OR BUTTOCKS EVERY 30 DAYS, Disp: 10 mL, Rfl: 0    cyclobenzaprine (Flexeril) 10 mg tablet, Take 1 tablet (10 mg) by mouth as needed at bedtime for muscle spasms., Disp: 30 tablet, Rfl: 1    levothyroxine (Synthroid, Levoxyl) 50 mcg tablet, TAKE 1 TABLET BY MOUTH ONCE DAILY, Disp: 90 tablet, Rfl: 3    phentermine (Adipex-P) 37.5 mg tablet, Take 1 tablet (37.5 mg) by mouth once daily in the morning. Take before meals., Disp: 30 tablet, Rfl: 0    triamterene-hydrochlorothiazid (Maxzide-25) 37.5-25 mg tablet, Take 1 tablet by mouth once daily., Disp: 90 tablet, Rfl: 3    Review of Systems   GENERAL: Negative for malaise, significant weight loss, fever  MUSCULOSKELETAL: see HPI  NEURO:  Negative    Physical Examination:  Right knee:  Skin healthy and intact  Swelling noted   Range of motion 5 degrees to 65 degrees  Tenderness: Proximal tibia  Intact flexion and extension of digits  Neurovascular exam normal distally  2+ dorsalis pedis pulse and good cap refill    Imaging:  Nondisplaced tibial plateau fracture    Assessment:  Patient with a right tibial plateau fracture    Plan:  Discussed non-surgical management outlining a period of immobilization followed by rehabilitation and return to activities.  Stressed the importance of nonweightbearing.  Okay to do desk work.    Discussed brace today for stability.  Recommended home exercise program will start formal therapy at next visit.    Reviewed DVT prophylaxis     Follow up:  3 weeks with x-ray    I have personally reviewed the [facility] report for this patient. This report is scanned into  the electronic medical record. I have considered the risks of abuse, dependence, addiction, and diversion. They currently  report a pain of 8.

## 2024-06-06 ENCOUNTER — TELEPHONE (OUTPATIENT)
Dept: ORTHOPEDIC SURGERY | Facility: CLINIC | Age: 59
End: 2024-06-06
Payer: COMMERCIAL

## 2024-06-06 NOTE — TELEPHONE ENCOUNTER
Lvm for patient, per Steven patient should not be walking on the leg. Doing so can make the fracture worse and will end in surgery. Patient should continue to not walk on leg and follow up as scheduled.

## 2024-06-06 NOTE — TELEPHONE ENCOUNTER
Pt called stating she was told at her appt that she is not to ambulate on her leg. She is in more pain now than she was when she was using that leg and would call someone to call her. Feels that it is making it worse and she should be using it more. Her number is [phone].

## 2024-06-08 ENCOUNTER — HOSPITAL ENCOUNTER (OUTPATIENT)
Dept: RADIOLOGY | Facility: HOSPITAL | Age: 59
Discharge: HOME | End: 2024-06-08
Payer: COMMERCIAL

## 2024-06-08 DIAGNOSIS — S80.00XA CONTUSION OF KNEE, INITIAL ENCOUNTER: ICD-10-CM

## 2024-06-08 DIAGNOSIS — S83.90XA SPRAIN OF KNEE, INITIAL ENCOUNTER: ICD-10-CM

## 2024-06-08 PROCEDURE — 73721 MRI JNT OF LWR EXTRE W/O DYE: CPT | Mod: RT

## 2024-06-12 ENCOUNTER — APPOINTMENT (OUTPATIENT)
Dept: PRIMARY CARE | Facility: CLINIC | Age: 59
End: 2024-06-12
Payer: COMMERCIAL

## 2024-06-12 VITALS
RESPIRATION RATE: 18 BRPM | WEIGHT: 187.8 LBS | OXYGEN SATURATION: 97 % | BODY MASS INDEX: 28.46 KG/M2 | TEMPERATURE: 96.4 F | HEART RATE: 77 BPM | SYSTOLIC BLOOD PRESSURE: 114 MMHG | DIASTOLIC BLOOD PRESSURE: 78 MMHG | HEIGHT: 68 IN

## 2024-06-12 DIAGNOSIS — E03.9 ACQUIRED HYPOTHYROIDISM: ICD-10-CM

## 2024-06-12 DIAGNOSIS — E66.3 OVERWEIGHT WITH BODY MASS INDEX (BMI) OF 28 TO 28.9 IN ADULT: ICD-10-CM

## 2024-06-12 DIAGNOSIS — H81.03 MENIERE'S DISEASE OF BOTH EARS: ICD-10-CM

## 2024-06-12 PROCEDURE — 3008F BODY MASS INDEX DOCD: CPT | Performed by: FAMILY MEDICINE

## 2024-06-12 PROCEDURE — 99213 OFFICE O/P EST LOW 20 MIN: CPT | Performed by: FAMILY MEDICINE

## 2024-06-12 PROCEDURE — 1036F TOBACCO NON-USER: CPT | Performed by: FAMILY MEDICINE

## 2024-06-12 RX ORDER — PHENTERMINE HYDROCHLORIDE 37.5 MG/1
37.5 TABLET ORAL
Qty: 30 TABLET | Refills: 0 | Status: SHIPPED | OUTPATIENT
Start: 2024-06-12 | End: 2024-07-12

## 2024-06-12 ASSESSMENT — PATIENT HEALTH QUESTIONNAIRE - PHQ9
2. FEELING DOWN, DEPRESSED OR HOPELESS: NOT AT ALL
1. LITTLE INTEREST OR PLEASURE IN DOING THINGS: NOT AT ALL
SUM OF ALL RESPONSES TO PHQ9 QUESTIONS 1 & 2: 0

## 2024-06-12 ASSESSMENT — ENCOUNTER SYMPTOMS
LOSS OF SENSATION IN FEET: 0
DEPRESSION: 0
OCCASIONAL FEELINGS OF UNSTEADINESS: 1

## 2024-06-12 NOTE — PROGRESS NOTES
Malathi Castellanos is a 59 y.o. female presenting for follow-up of Follow-up (Acquired hypothyroidism//Meniere's disease of both ears//Overweight with body mass index (BMI) of 28 to 28.9 in adult//Lipid screening). I last saw the patient 5/8/2024.   Chief Complaint   Patient presents with    Follow-up     Acquired hypothyroidism    Meniere's disease of both ears    Overweight with body mass index (BMI) of 28 to 28.9 in adult    Lipid screening     HPI:     Patient is here for a F/U. Patient is eating well and exercising as tolerated. The patient is compliant with her prescribed medications. She is tolerating her medication well. Since her last visit, she did fall and injured her right knee she did see orthopedic surgeon for treatment.      ROS  Except positives as noted in the CC & HPI   Constitutional: Denies fevers, chills, night sweats, fatigue, weight changes, change in appetite   Eyes: Denies blurry vision, double vision   ENT: Denies otalgia, trouble hearing, tinnitus, vertigo, nasal congestion, rhinorrhea, sore throat   Neck: Denies swelling, masses   Cardiovascular: Denies chest pain, palpitations, edema, orthopnea, syncope   Respiratory: Denies dyspnea, cough, wheezing, postural nocturnal dyspnea   Gastrointestinal: Denies abdominal pain, nausea, vomiting, diarrhea, constipation, melena, hematochezia   Genitourinary: Denies dysuria, hematuria, frequency, urgency   Musculoskeletal: Denies back pain, neck pain, arthralgias, myalgias   Integumentary: Denies skin lesions, rashes, masses   Neurological: Denies dizziness, headaches, confusion, limb weakness, paresthesias, syncope, convulsions   Psychiatric: Denies depression, anxiety, homicidal ideations, suicidal ideations, sleep disturbances   Endocrine: Denies polyphagia, polydipsia, polyuria, weakness, hair thinning, heat intolerance, cold intolerance, weight changes   Heme/Lymph: Denies easy bruising, easy bleeding, swollen glands     Vitals:    06/12/24 1457  "06/12/24 1556   BP: 116/60 114/78   BP Location: Left arm Left arm   Patient Position: Sitting Sitting   BP Cuff Size:  Adult   Pulse: 77    Resp: 18    Temp: 35.8 °C (96.4 °F)    TempSrc: Temporal    SpO2: 97%    Weight: 85.2 kg (187 lb 12.8 oz)    Height: 1.727 m (5' 8\")        PHYSICAL EXAM  114/76 on recheck of BP in the right arm.     Gen. Appearance - well-developed, well-nourished, 59 y.o., White female in no acute distress.     Skin - warm, pink and dry without rash or concerning lesions.     Mental Status - alert and oriented times 3. Normal mood and affect appropriate to mood.     Neck - supple without lymphadenopathy. Carotid pulses are normal without bruits. Thyroid is normal in midline without nodules.     Chest - lungs are clear to auscultation without rales, rhonchi or wheezes.    Heart - regular, rate, and rhythm without murmurs, rubs or gallops.     Extremities - no cyanosis, clubbing or edema. Pedal pulses are 2+ normal at the dorsalis pedis and posterior tibial pulses bilaterally. She is on crutches and has a knee mobilizer on right lower extremity.    Neurological - cranial nerves II through XII are grossly intact. Motor strength 5/5 at all fours.     ASSESSMENT:  1. Acquired hypothyroidism  Follow Up [facility] - PCP - Established      2. Meniere's disease of both ears  Follow Up [facility] - PCP - Established      3. Overweight with body mass index (BMI) of 28 to 28.9 in adult  phentermine (Adipex-P) 37.5 mg tablet      PLAN:  Patient to continue current medications (with any exceptions as noted) and diet. Follow-up in 1 month(s) otherwise as needed.      Patient was given refill(s) on:   Phentermine 37.5 mg, TAKE 1 TAB BY MOUTH DAILY     Rx(s) sent to pharmacy.     Plan to discontinue Phentermine when BMI is 27 or less.     Scribe Attestation  By signing my name below, Suzy SCOTT , Faizan   attest that this documentation has been prepared under the direction " and in the presence of Allan Tubbs MD.

## 2024-06-17 ENCOUNTER — APPOINTMENT (OUTPATIENT)
Dept: RADIOLOGY | Facility: HOSPITAL | Age: 59
End: 2024-06-17
Payer: COMMERCIAL

## 2024-06-26 ENCOUNTER — OFFICE VISIT (OUTPATIENT)
Dept: ORTHOPEDIC SURGERY | Facility: CLINIC | Age: 59
End: 2024-06-26
Payer: COMMERCIAL

## 2024-06-26 ENCOUNTER — HOSPITAL ENCOUNTER (OUTPATIENT)
Dept: RADIOLOGY | Facility: CLINIC | Age: 59
Discharge: HOME | End: 2024-06-26
Payer: COMMERCIAL

## 2024-06-26 DIAGNOSIS — S80.00XA CONTUSION OF KNEE, INITIAL ENCOUNTER: ICD-10-CM

## 2024-06-26 DIAGNOSIS — S83.90XA SPRAIN OF KNEE, INITIAL ENCOUNTER: ICD-10-CM

## 2024-06-26 PROCEDURE — 3008F BODY MASS INDEX DOCD: CPT | Performed by: ORTHOPAEDIC SURGERY

## 2024-06-26 PROCEDURE — 99024 POSTOP FOLLOW-UP VISIT: CPT | Performed by: ORTHOPAEDIC SURGERY

## 2024-06-26 PROCEDURE — 1036F TOBACCO NON-USER: CPT | Performed by: ORTHOPAEDIC SURGERY

## 2024-06-26 PROCEDURE — 73560 X-RAY EXAM OF KNEE 1 OR 2: CPT | Mod: RIGHT SIDE | Performed by: ORTHOPAEDIC SURGERY

## 2024-06-26 PROCEDURE — 99213 OFFICE O/P EST LOW 20 MIN: CPT | Performed by: ORTHOPAEDIC SURGERY

## 2024-06-26 PROCEDURE — 73560 X-RAY EXAM OF KNEE 1 OR 2: CPT | Mod: RT

## 2024-07-15 ENCOUNTER — APPOINTMENT (OUTPATIENT)
Dept: PRIMARY CARE | Facility: CLINIC | Age: 59
End: 2024-07-15
Payer: COMMERCIAL

## 2024-07-15 VITALS — BODY MASS INDEX: 28.25 KG/M2 | WEIGHT: 185.8 LBS

## 2024-07-15 DIAGNOSIS — E66.3 OVERWEIGHT WITH BODY MASS INDEX (BMI) OF 28 TO 28.9 IN ADULT: ICD-10-CM

## 2024-07-15 PROCEDURE — 3008F BODY MASS INDEX DOCD: CPT | Performed by: FAMILY MEDICINE

## 2024-07-15 PROCEDURE — 99213 OFFICE O/P EST LOW 20 MIN: CPT | Performed by: FAMILY MEDICINE

## 2024-07-15 PROCEDURE — 1036F TOBACCO NON-USER: CPT | Performed by: FAMILY MEDICINE

## 2024-07-15 RX ORDER — PHENTERMINE HYDROCHLORIDE 37.5 MG/1
37.5 TABLET ORAL
Qty: 30 TABLET | Refills: 0 | Status: SHIPPED | OUTPATIENT
Start: 2024-07-15 | End: 2024-08-14

## 2024-07-15 NOTE — PROGRESS NOTES
Subjective   Patient ID: Malathi Castellanos is a 59 y.o. female who presents for Follow-up (Acquired hypothyroidism; Meniere's disease of both ears/).  I last saw the patient on 6/12/2024    HPI     Patient is here for a F/U    Past medical, surgical, and family history reviewed.    Reviewed and documented all medications     Pt eating well, exercising as tolerated and taking medications as directed    Has no medical concerns for rooming MA    Patient is on Phentermine for weight management. She denies chest pain, palpitations and headaches.      Review of Systems  Except positives as noted in the CC & HPI      Constitutional: Denies fevers, chills, night sweats, fatigue, weight changes, change in appetite    Eyes: Denies blurry vision, double vision    ENT: Denies otalgia, trouble hearing, tinnitus, vertigo, nasal congestion, rhinorrhea, sore throat    Neck: Denies swelling, masses    Cardiovascular: Denies chest pain, palpitations, edema, orthopnea, syncope    Respiratory: Denies dyspnea, cough, wheezing, postural nocturnal dyspnea    Gastrointestinal: Denies abdominal pain, nausea, vomiting, diarrhea, constipation, melena, hematochezia    Genitourinary: Denies dysuria, hematuria, frequency, urgency    Musculoskeletal: Denies back pain, neck pain, arthralgias, myalgias    Integumentary: Denies skin lesions, rashes, masses    Neurological: Denies dizziness, headaches, confusion, limb weakness, paresthesias, syncope, convulsions    Psychiatric: Denies depression, anxiety, homicidal ideations, suicidal ideations, sleep disturbances    Endocrine: Denies polyphagia, polydipsia, polyuria, weakness, hair thinning, heat intolerance, cold intolerance, weight changes    Heme/Lymph: Denies easy bruising, easy bleeding, swollen glands    Objective   Wt 84.3 kg (185 lb 12.8 oz)   BMI 28.25 kg/m²     Physical Exam    Telehealth visit.    Patient does sound well on telephone without apparent distress.      Assessment/Plan   1.  Overweight with body mass index (BMI) of 28 to 28.9 in adult  phentermine (Adipex-P) 37.5 mg tablet        Patient to continue current medications (with any exceptions as noted) and diet. Follow-up in 1 month(s), otherwise as needed.     Patient was given refill(s) on:   Phentermine 37.5 mg, TAKE 1 TAB BY MOUTH DAILY      Rx(s) sent to pharmacy.    Scribe Attestation  By signing my name below, ISuzy Scribe   attest that this documentation has been prepared under the direction and in the presence of Allan Tubbs MD.

## 2024-07-16 ENCOUNTER — TELEPHONE (OUTPATIENT)
Dept: PRIMARY CARE | Facility: CLINIC | Age: 59
End: 2024-07-16
Payer: COMMERCIAL

## 2024-07-16 NOTE — TELEPHONE ENCOUNTER
Prior Authorization for phentermine (Adipex-P) 37.5 mg tablet  has been  DENIED.     Denial letter scanned into media on 07.16.2024

## 2024-07-18 ENCOUNTER — TELEPHONE (OUTPATIENT)
Dept: PRIMARY CARE | Facility: CLINIC | Age: 59
End: 2024-07-18
Payer: COMMERCIAL

## 2024-07-18 NOTE — TELEPHONE ENCOUNTER
Malathi called in to make an appointment for her monthly check-up for her medication refill. The only open slot prior to when her RX is due, is Aug. 1st @ 3:45. This is only 2 weeks since her last check-in. She wants to know if Dr. Tubbs is ok with that. If not, please advise patient if she can be seen at another appointment time.

## 2024-07-18 NOTE — TELEPHONE ENCOUNTER
I called and left a message stating that she can be seen on Aug 19th at 8:45 am. I went ahead and placed her on the schedule and informed her to give the office a call back if that appt time  for her or not.

## 2024-08-01 ENCOUNTER — APPOINTMENT (OUTPATIENT)
Dept: PRIMARY CARE | Facility: CLINIC | Age: 59
End: 2024-08-01
Payer: COMMERCIAL

## 2024-08-12 PROCEDURE — RXMED WILLOW AMBULATORY MEDICATION CHARGE

## 2024-08-14 ENCOUNTER — PHARMACY VISIT (OUTPATIENT)
Dept: PHARMACY | Facility: CLINIC | Age: 59
End: 2024-08-14
Payer: COMMERCIAL

## 2024-08-19 ENCOUNTER — APPOINTMENT (OUTPATIENT)
Dept: PRIMARY CARE | Facility: CLINIC | Age: 59
End: 2024-08-19
Payer: COMMERCIAL

## 2024-08-20 ENCOUNTER — PHARMACY VISIT (OUTPATIENT)
Dept: PHARMACY | Facility: CLINIC | Age: 59
End: 2024-08-20
Payer: COMMERCIAL

## 2024-08-20 PROCEDURE — RXMED WILLOW AMBULATORY MEDICATION CHARGE

## 2024-08-20 RX ORDER — CIPROFLOXACIN AND DEXAMETHASONE 3; 1 MG/ML; MG/ML
4 SUSPENSION/ DROPS AURICULAR (OTIC) 2 TIMES DAILY
Qty: 7.5 ML | Refills: 0 | OUTPATIENT
Start: 2024-08-20

## 2024-08-20 NOTE — PROGRESS NOTES
"  Subjective   Patient ID: Malathi Castellanos is a 59 y.o. female who presents for Follow-up (Acquired hypothyroidism; Meniere's disease of both ears/). I last saw the patient 6/12/2024    HPI     Patient is here for a F/U  Pt is fasting for labs    Past medical, surgical, and family history reviewed.  Reviewed and documented all medications   Pt eating well, exercising as tolerated and taking medications as directed    Has no medical concerns for rooming MA      Review of Systems  Except positives as noted in the CC & HPI      Constitutional: Denies fevers, chills, night sweats, fatigue, weight changes, change in appetite    Eyes: Denies blurry vision, double vision    ENT: Denies otalgia, trouble hearing, tinnitus, vertigo, nasal congestion, rhinorrhea, sore throat    Neck: Denies swelling, masses    Cardiovascular: Denies chest pain, palpitations, edema, orthopnea, syncope    Respiratory: Denies dyspnea, cough, wheezing, postural nocturnal dyspnea    Gastrointestinal: Denies abdominal pain, nausea, vomiting, diarrhea, constipation, melena, hematochezia    Genitourinary: Denies dysuria, hematuria, frequency, urgency    Musculoskeletal: Denies back pain, arthralgias, myalgias    Integumentary: Denies skin lesions, rashes, masses    Neurological: Denies dizziness, headaches, confusion, limb weakness, paresthesias, syncope, convulsions    Psychiatric: Denies depression, anxiety, homicidal ideations, suicidal ideations, sleep disturbances    Endocrine: Denies polyphagia, polydipsia, polyuria, weakness, hair thinning, heat intolerance, cold intolerance, weight changes    Heme/Lymph: Denies easy bruising, easy bleeding, swollen glands    Objective   /76 (BP Location: Right arm, Patient Position: Sitting, BP Cuff Size: Adult long)   Pulse 76   Temp 36.4 °C (97.5 °F)   Resp 16   Ht 1.727 m (5' 8\")   Wt 83 kg (183 lb)   SpO2 98%   BMI 27.83 kg/m²     Physical Exam  112/76 on recheck of BP in the right arm.     Gen. " Appearance - well-developed, well-nourished, 59 y.o., White female in no acute distress.     Skin - warm, pink and dry without rash or concerning lesions.     Mental Status - alert and oriented times 3. Normal mood and affect appropriate to mood.     Neck - supple without lymphadenopathy. Carotid pulses are normal without bruits. Thyroid is normal in midline without nodules. Mild tenderness to palpation with induration in the mid trapezius bilaterally.     Chest - lungs are clear to auscultation without rales, rhonchi or wheezes.    Heart - regular, rate, and rhythm without murmurs, rubs or gallops.     Extremities - no cyanosis, clubbing or edema. Pedal pulses are 2+ normal at the dorsalis pedis and posterior tibial pulses bilaterally.     Neurological - cranial nerves II through XII are grossly intact. Motor strength 5/5 at all fours.     Assessment/Plan   1. Acquired hypothyroidism        2. S/P gastric bypass  Vitamin B12      3. Pain of toe of right foot  Uric Acid      4. Vitamin B12 deficiency        5. Acquired absence of both breasts        6. Pulmonary nodules        7. Overweight with body mass index (BMI) of 28 to 28.9 in adult  phentermine (Adipex-P) 37.5 mg tablet        Patient to continue current medications (with any exceptions as noted) and diet. Follow-up in 1 month(s) otherwise as needed.      Will obtain Vitamin B12, Uric acid prior to patient's next appointment. Will call patient with results when available.     Patient was given refill(s) on:   Phentermine 37.5 mg, TAKE 1 TAB BY MOUTH DAILY      Rx(s) sent to pharmacy.     Patient's goal weight is 170 pounds.    Patient declined Colonoscopy at this visit.    Scribe Attestation  By signing my name below, ISuzy , Faizan   attest that this documentation has been prepared under the direction and in the presence of Allan Tubbs MD.

## 2024-08-21 ENCOUNTER — APPOINTMENT (OUTPATIENT)
Dept: PRIMARY CARE | Facility: CLINIC | Age: 59
End: 2024-08-21
Payer: COMMERCIAL

## 2024-08-21 ENCOUNTER — LAB (OUTPATIENT)
Dept: LAB | Facility: LAB | Age: 59
End: 2024-08-21
Payer: COMMERCIAL

## 2024-08-21 VITALS
RESPIRATION RATE: 16 BRPM | SYSTOLIC BLOOD PRESSURE: 112 MMHG | OXYGEN SATURATION: 98 % | BODY MASS INDEX: 27.74 KG/M2 | TEMPERATURE: 97.5 F | HEART RATE: 76 BPM | WEIGHT: 183 LBS | HEIGHT: 68 IN | DIASTOLIC BLOOD PRESSURE: 76 MMHG

## 2024-08-21 DIAGNOSIS — Z13.220 LIPID SCREENING: ICD-10-CM

## 2024-08-21 DIAGNOSIS — E66.3 OVERWEIGHT WITH BODY MASS INDEX (BMI) OF 28 TO 28.9 IN ADULT: ICD-10-CM

## 2024-08-21 DIAGNOSIS — Z90.13 ACQUIRED ABSENCE OF BOTH BREASTS: ICD-10-CM

## 2024-08-21 DIAGNOSIS — E53.8 VITAMIN B12 DEFICIENCY: ICD-10-CM

## 2024-08-21 DIAGNOSIS — Z98.84 S/P GASTRIC BYPASS: ICD-10-CM

## 2024-08-21 DIAGNOSIS — M79.674 PAIN OF TOE OF RIGHT FOOT: ICD-10-CM

## 2024-08-21 DIAGNOSIS — R91.8 PULMONARY NODULES: ICD-10-CM

## 2024-08-21 DIAGNOSIS — E03.9 ACQUIRED HYPOTHYROIDISM: Primary | ICD-10-CM

## 2024-08-21 DIAGNOSIS — E03.9 ACQUIRED HYPOTHYROIDISM: ICD-10-CM

## 2024-08-21 LAB
ALBUMIN SERPL BCP-MCNC: 4.5 G/DL (ref 3.4–5)
ALP SERPL-CCNC: 94 U/L (ref 33–110)
ALT SERPL W P-5'-P-CCNC: 15 U/L (ref 7–45)
ANION GAP SERPL CALC-SCNC: 11 MMOL/L (ref 10–20)
AST SERPL W P-5'-P-CCNC: 23 U/L (ref 9–39)
BASOPHILS # BLD AUTO: 0.09 X10*3/UL (ref 0–0.1)
BASOPHILS NFR BLD AUTO: 1.4 %
BILIRUB SERPL-MCNC: 1 MG/DL (ref 0–1.2)
BUN SERPL-MCNC: 13 MG/DL (ref 6–23)
CALCIUM SERPL-MCNC: 10.2 MG/DL (ref 8.6–10.3)
CHLORIDE SERPL-SCNC: 102 MMOL/L (ref 98–107)
CHOLEST SERPL-MCNC: 151 MG/DL (ref 0–199)
CHOLESTEROL/HDL RATIO: 2.4
CO2 SERPL-SCNC: 30 MMOL/L (ref 21–32)
CREAT SERPL-MCNC: 0.65 MG/DL (ref 0.5–1.05)
EGFRCR SERPLBLD CKD-EPI 2021: >90 ML/MIN/1.73M*2
EOSINOPHIL # BLD AUTO: 0.14 X10*3/UL (ref 0–0.7)
EOSINOPHIL NFR BLD AUTO: 2.1 %
ERYTHROCYTE [DISTWIDTH] IN BLOOD BY AUTOMATED COUNT: 15.9 % (ref 11.5–14.5)
GLUCOSE SERPL-MCNC: 94 MG/DL (ref 74–99)
HCT VFR BLD AUTO: 37.2 % (ref 36–46)
HDLC SERPL-MCNC: 61.9 MG/DL
HGB BLD-MCNC: 11.2 G/DL (ref 12–16)
IMM GRANULOCYTES # BLD AUTO: 0.02 X10*3/UL (ref 0–0.7)
IMM GRANULOCYTES NFR BLD AUTO: 0.3 % (ref 0–0.9)
LDLC SERPL CALC-MCNC: 67 MG/DL
LYMPHOCYTES # BLD AUTO: 1.7 X10*3/UL (ref 1.2–4.8)
LYMPHOCYTES NFR BLD AUTO: 26 %
MCH RBC QN AUTO: 24.7 PG (ref 26–34)
MCHC RBC AUTO-ENTMCNC: 30.1 G/DL (ref 32–36)
MCV RBC AUTO: 82 FL (ref 80–100)
MONOCYTES # BLD AUTO: 0.72 X10*3/UL (ref 0.1–1)
MONOCYTES NFR BLD AUTO: 11 %
NEUTROPHILS # BLD AUTO: 3.88 X10*3/UL (ref 1.2–7.7)
NEUTROPHILS NFR BLD AUTO: 59.2 %
NON HDL CHOLESTEROL: 89 MG/DL (ref 0–149)
NRBC BLD-RTO: 0 /100 WBCS (ref 0–0)
PLATELET # BLD AUTO: 438 X10*3/UL (ref 150–450)
POTASSIUM SERPL-SCNC: 4.2 MMOL/L (ref 3.5–5.3)
PROT SERPL-MCNC: 7.4 G/DL (ref 6.4–8.2)
RBC # BLD AUTO: 4.53 X10*6/UL (ref 4–5.2)
SODIUM SERPL-SCNC: 139 MMOL/L (ref 136–145)
TRIGL SERPL-MCNC: 112 MG/DL (ref 0–149)
TSH SERPL-ACNC: 2.45 MIU/L (ref 0.44–3.98)
URATE SERPL-MCNC: 5.9 MG/DL (ref 2.3–6.7)
VIT B12 SERPL-MCNC: 167 PG/ML (ref 211–911)
VLDL: 22 MG/DL (ref 0–40)
WBC # BLD AUTO: 6.6 X10*3/UL (ref 4.4–11.3)

## 2024-08-21 PROCEDURE — 1036F TOBACCO NON-USER: CPT | Performed by: FAMILY MEDICINE

## 2024-08-21 PROCEDURE — 84443 ASSAY THYROID STIM HORMONE: CPT

## 2024-08-21 PROCEDURE — 85025 COMPLETE CBC W/AUTO DIFF WBC: CPT

## 2024-08-21 PROCEDURE — 36415 COLL VENOUS BLD VENIPUNCTURE: CPT

## 2024-08-21 PROCEDURE — 80061 LIPID PANEL: CPT

## 2024-08-21 PROCEDURE — 3008F BODY MASS INDEX DOCD: CPT | Performed by: FAMILY MEDICINE

## 2024-08-21 PROCEDURE — 99213 OFFICE O/P EST LOW 20 MIN: CPT | Performed by: FAMILY MEDICINE

## 2024-08-21 PROCEDURE — 84550 ASSAY OF BLOOD/URIC ACID: CPT

## 2024-08-21 PROCEDURE — 82607 VITAMIN B-12: CPT

## 2024-08-21 PROCEDURE — 80053 COMPREHEN METABOLIC PANEL: CPT

## 2024-08-21 RX ORDER — PHENTERMINE HYDROCHLORIDE 37.5 MG/1
37.5 TABLET ORAL
Qty: 30 TABLET | Refills: 0 | Status: SHIPPED | OUTPATIENT
Start: 2024-08-21 | End: 2024-09-20

## 2024-08-21 ASSESSMENT — PATIENT HEALTH QUESTIONNAIRE - PHQ9
SUM OF ALL RESPONSES TO PHQ9 QUESTIONS 1 AND 2: 0
2. FEELING DOWN, DEPRESSED OR HOPELESS: NOT AT ALL
1. LITTLE INTEREST OR PLEASURE IN DOING THINGS: NOT AT ALL

## 2024-08-22 ENCOUNTER — TELEPHONE (OUTPATIENT)
Dept: PRIMARY CARE | Facility: CLINIC | Age: 59
End: 2024-08-22
Payer: COMMERCIAL

## 2024-08-22 NOTE — RESULT ENCOUNTER NOTE
Please call the patient regarding her abnormal result.  Vitamin B12 level is low at 167.  Patient may return for vitamin B12 injections, 1000 mcg IM weekly x 2 then every 2 weeks x 2 and then once monthly.  CBC is normal.   Chemistry panel is normal.  T.Chol 151, LDL 67, HDL 62, . Follow low cholesterol, low fat diet and exercise as tolerated.  Uric acid level is normal at 5.9.  Thyroid function is normal with a TSH of 2.45.

## 2024-08-22 NOTE — TELEPHONE ENCOUNTER
Prior Authorization for phentermine (Adipex-P) 37.5 mg tablet  has been DENIED.     Denial letter scanned into media on 08.22.2024

## 2024-08-26 DIAGNOSIS — E53.8 VITAMIN B12 DEFICIENCY: Primary | ICD-10-CM

## 2024-08-26 RX ORDER — CYANOCOBALAMIN 1000 UG/ML
INJECTION, SOLUTION INTRAMUSCULAR; SUBCUTANEOUS
Qty: 10 ML | Refills: 0 | Status: SHIPPED | OUTPATIENT
Start: 2024-08-26

## 2024-08-26 NOTE — TELEPHONE ENCOUNTER
While  going over patient lab results that included increasing her vitamin b12 injections, patient requested a refill.     Last fill: 04.17.2024  Pharmacy: [facility]     Please review dosage schedule to reflect weekly for two weeks then monthly and confirm if correct.

## 2024-08-27 ENCOUNTER — TELEPHONE (OUTPATIENT)
Dept: PRIMARY CARE | Facility: CLINIC | Age: 59
End: 2024-08-27
Payer: COMMERCIAL

## 2024-08-27 NOTE — TELEPHONE ENCOUNTER
Prior Authorization for cyanocobalamin (Vitamin B-12) 1,000 mcg/mL injection is NOT NEEDED.    Letter scanned into media on 08.27.2024

## 2024-09-10 ENCOUNTER — APPOINTMENT (OUTPATIENT)
Dept: AUDIOLOGY | Facility: CLINIC | Age: 59
End: 2024-09-10
Payer: COMMERCIAL

## 2024-09-10 DIAGNOSIS — H90.A32 MIXED CONDUCTIVE AND SENSORINEURAL HEARING LOSS OF LEFT EAR WITH RESTRICTED HEARING OF RIGHT EAR: ICD-10-CM

## 2024-09-10 DIAGNOSIS — H90.A21 SENSORINEURAL HEARING LOSS (SNHL) OF RIGHT EAR WITH RESTRICTED HEARING OF LEFT EAR: Primary | ICD-10-CM

## 2024-09-10 PROCEDURE — 92557 COMPREHENSIVE HEARING TEST: CPT | Performed by: AUDIOLOGIST

## 2024-09-10 PROCEDURE — 92567 TYMPANOMETRY: CPT | Performed by: AUDIOLOGIST

## 2024-09-10 NOTE — PROGRESS NOTES
Ms. Castellanos, age 59, was seen today for an updated hearing evaluation and consultation for a new hearing aid.  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  She has previously worn a ReSound RENE style hearing aid in her left ear which was fit in 2015.  She returned today to discuss her options for new amplification.    Results:  Otoscopy revealed clear ear canals with visualized intact tympanic membrane in her right ear and visualized tympanic membrane perforation in her left ear.  Tympanometry revealed a normal, Type A tympanogram in her right ear, indicating normal ear canal volume, peak pressure and compliance and flat, Type B tympanogram in her left ear with large ear canal volume, consistent with known tympanic membrane perforation.  Audiometric thresholds revealed a mild sloping to moderate sensorineural hearing loss in her right ear and a moderate to severe mixed hearing loss in her left ear.  Word recognition scores were excellent bilaterally.  Hearing levels have remained stable as compared to her last evaluation in 2019.    Procedure:  Hearing evaluation results were reviewed.  Hearing aid styles, benefits of amplification and binaural hearing, realistic expectations, differences across levels of technology, rechargeable hearing aids and direct wireless compatibly options were discussed at this appointment.  By the end of the appointment, the patient decided to order a left ReSound Nexia ITC hearing aid.  She will be contacted when her order arrives to schedule her hearing aid fitting appointment.

## 2024-09-24 ENCOUNTER — APPOINTMENT (OUTPATIENT)
Dept: PRIMARY CARE | Facility: CLINIC | Age: 59
End: 2024-09-24
Payer: COMMERCIAL

## 2024-09-24 VITALS
HEIGHT: 68 IN | TEMPERATURE: 97.2 F | WEIGHT: 186 LBS | RESPIRATION RATE: 16 BRPM | DIASTOLIC BLOOD PRESSURE: 82 MMHG | OXYGEN SATURATION: 96 % | HEART RATE: 78 BPM | SYSTOLIC BLOOD PRESSURE: 112 MMHG | BODY MASS INDEX: 28.19 KG/M2

## 2024-09-24 DIAGNOSIS — S80.00XA CONTUSION OF KNEE, INITIAL ENCOUNTER: ICD-10-CM

## 2024-09-24 DIAGNOSIS — S83.90XA SPRAIN OF KNEE, INITIAL ENCOUNTER: ICD-10-CM

## 2024-09-24 DIAGNOSIS — E66.3 OVERWEIGHT WITH BODY MASS INDEX (BMI) OF 28 TO 28.9 IN ADULT: Primary | ICD-10-CM

## 2024-09-24 DIAGNOSIS — E03.9 ACQUIRED HYPOTHYROIDISM: ICD-10-CM

## 2024-09-24 PROCEDURE — 99213 OFFICE O/P EST LOW 20 MIN: CPT | Performed by: FAMILY MEDICINE

## 2024-09-24 PROCEDURE — 1036F TOBACCO NON-USER: CPT | Performed by: FAMILY MEDICINE

## 2024-09-24 PROCEDURE — 3008F BODY MASS INDEX DOCD: CPT | Performed by: FAMILY MEDICINE

## 2024-09-24 RX ORDER — CYCLOBENZAPRINE HCL 10 MG
10 TABLET ORAL NIGHTLY PRN
Qty: 30 TABLET | Refills: 1 | Status: SHIPPED | OUTPATIENT
Start: 2024-09-24 | End: 2024-11-23

## 2024-09-24 RX ORDER — PHENTERMINE HYDROCHLORIDE 37.5 MG/1
37.5 TABLET ORAL
Qty: 30 TABLET | Refills: 0 | Status: CANCELLED | OUTPATIENT
Start: 2024-09-24 | End: 2024-10-24

## 2024-09-24 ASSESSMENT — PATIENT HEALTH QUESTIONNAIRE - PHQ9
1. LITTLE INTEREST OR PLEASURE IN DOING THINGS: NOT AT ALL
2. FEELING DOWN, DEPRESSED OR HOPELESS: NOT AT ALL
SUM OF ALL RESPONSES TO PHQ9 QUESTIONS 1 AND 2: 0

## 2024-10-02 ENCOUNTER — APPOINTMENT (OUTPATIENT)
Dept: ORTHOPEDIC SURGERY | Facility: CLINIC | Age: 59
End: 2024-10-02
Payer: COMMERCIAL

## 2024-10-02 DIAGNOSIS — M79.672 LEFT FOOT PAIN: ICD-10-CM

## 2024-10-02 DIAGNOSIS — M17.12 PRIMARY OSTEOARTHRITIS OF LEFT KNEE: Primary | ICD-10-CM

## 2024-10-02 PROCEDURE — 1036F TOBACCO NON-USER: CPT | Performed by: ORTHOPAEDIC SURGERY

## 2024-10-02 PROCEDURE — 20610 DRAIN/INJ JOINT/BURSA W/O US: CPT | Performed by: ORTHOPAEDIC SURGERY

## 2024-10-02 PROCEDURE — 99213 OFFICE O/P EST LOW 20 MIN: CPT | Performed by: ORTHOPAEDIC SURGERY

## 2024-10-02 RX ORDER — LIDOCAINE HYDROCHLORIDE 10 MG/ML
4 INJECTION, SOLUTION INFILTRATION; PERINEURAL
Status: COMPLETED | OUTPATIENT
Start: 2024-10-02 | End: 2024-10-02

## 2024-10-02 RX ORDER — BETAMETHASONE SODIUM PHOSPHATE AND BETAMETHASONE ACETATE 3; 3 MG/ML; MG/ML
2 INJECTION, SUSPENSION INTRA-ARTICULAR; INTRALESIONAL; INTRAMUSCULAR; SOFT TISSUE
Status: COMPLETED | OUTPATIENT
Start: 2024-10-02 | End: 2024-10-02

## 2024-10-02 NOTE — PROGRESS NOTES
History of present illness    59-year-old female well-known to me I have seen for multiple injuries in the past comes in for evaluation of her knee and foot.  She states that she had an injury back in May when she had a fall and at that time she was primarily complaining of pain in her right knee which she was also having issues with her left knee and her left foot in fact at the time of fall she felt a pop in her left foot and she was subsequently found to have a tibial plateau fracture on the right side and this has been treated and is doing much better but her left knee and left foot continue to give her trouble she comes in today for evaluation.      Past medical , Surgical, Family and social history reviewed.      Physical exam  General: No acute distress and breathing comfortably.  Patient is pleasant and cooperative with the examination.    Extremity  Left knee the affected knee was examined and inspected and was tender to touch along the medial aspect.  The patient has catching/locking and occasional mechanical symptoms.  The skin was intact without breakdown or open wounds.  There was a mild Jose exam seen with mild evidence of instability and weakness in the collateral ligaments with laxity to varus valgus stress and in the anterior posterior plane.  There was a negative Lachman's test, pivot shift test, and posterior drawer sign.  There was no foot drop, numbness or tingling.  Sensation, reflexes, and pulses in the foot and ankle were present.  There was an effusion but range of motion was good and straight leg raise testing was normal.   The patient had the ability to bear weight but with discomfort.  The patient's gait was antalgic secondary to the discomfort. Knee range of motion was 0-120    Left foot examination shows a well-healed incision along the medial border of the foot from previous tarsal tunnel release.  She is got a prominence along the base of the tibial tendon and  the medial aspect of the foot with a significant flatfoot deformity neurologically intact distally with brisk cap refill no other deformity    Diagnostics      No results found.     Procedure  See dictated procedure note    Assessment  Strange pain left knee with left knee arthritis, left foot posterior tibial tendon deficiency    Treatment plan  1.  Recommend cortisone injection left knee which is performed today with her consent without complication.  Patient understands the cortisone may provide temporary relief how much it helps her how long it lasts is unpredictable.  Cortisone can be repeated after 3 months if symptoms return.  Free sport lateral stabilizer for support of her left knee as well.  2.  I have also recommended referral to orthopedic foot and ankle and/or podiatry for evaluation of her posterior tibial tendon deficiency and her acute traumatic injury to her foot.  She will follow-up with me in a month to see how the cortisone injection helps.  3. [   ]  4.  All of the patient's questions were answered.    Orders Placed This Encounter    Inj/Asp: Left knee    Lateral Knee Stabilizer w/ Hinge    XR foot left 3+ views       This note was prepared using voice recognition software.  The details of this note are correct and have been reviewed, and corrected to the best of my ability.  Some grammatical areas may persist related to the Dragon software    Adelso Sharma MD  Senior Attending Physician  [facility]  [facility]    [phone]    Inj/Asp: Left knee on 10/2/2024 9:42 AM  Indications: pain and diagnostic evaluation  Details: 22 G needle, anteromedial approach  Medications: 2 mL betamethasone acet,sod phos 6 mg/mL; 4 mL lidocaine 10 mg/mL (1 %)  Outcome: tolerated well, no immediate complications  Procedure, treatment alternatives, risks and benefits explained, specific risks discussed. Consent was given by the patient. Immediately prior to procedure a time out was  called to verify the correct patient, procedure, equipment, support staff and site/side marked as required. Patient was prepped and draped in the usual sterile fashion.

## 2024-10-04 ENCOUNTER — APPOINTMENT (OUTPATIENT)
Dept: GASTROENTEROLOGY | Facility: CLINIC | Age: 59
End: 2024-10-04
Payer: COMMERCIAL

## 2024-10-04 VITALS
BODY MASS INDEX: 27.58 KG/M2 | WEIGHT: 182 LBS | OXYGEN SATURATION: 98 % | DIASTOLIC BLOOD PRESSURE: 82 MMHG | HEART RATE: 76 BPM | HEIGHT: 68 IN | SYSTOLIC BLOOD PRESSURE: 130 MMHG

## 2024-10-04 DIAGNOSIS — R63.4 WEIGHT LOSS: Primary | ICD-10-CM

## 2024-10-04 DIAGNOSIS — K52.9 CHRONIC DIARRHEA: ICD-10-CM

## 2024-10-04 DIAGNOSIS — R10.84 GENERALIZED ABDOMINAL PAIN: ICD-10-CM

## 2024-10-04 DIAGNOSIS — Z85.3 HISTORY OF BREAST CANCER: ICD-10-CM

## 2024-10-04 PROCEDURE — 3008F BODY MASS INDEX DOCD: CPT | Performed by: INTERNAL MEDICINE

## 2024-10-04 PROCEDURE — 99204 OFFICE O/P NEW MOD 45 MIN: CPT | Performed by: INTERNAL MEDICINE

## 2024-10-04 RX ORDER — TRIAMTERENE AND HYDROCHLOROTHIAZIDE 37.5; 25 MG/1; MG/1
1 CAPSULE ORAL EVERY MORNING
COMMUNITY

## 2024-10-04 NOTE — PROGRESS NOTES
REASON FOR VISIT: Abdominal pain, belching, chronic diarrhea, weight loss history of gastric bypass surgery, history of breast cancer    HPI:  Malathi Castellanos is a 59 y.o. female who presents for above problems.   She reports has been having mid left abdominal pain for more than 1 year that recently getting worse.  Pain is 5 out of 10 in severity blunt in character.  Not radiating to her back.  She also reports some nausea but no vomiting.  She is taking weight loss medication and reports 20 to 30 pound weight loss within past 1 year.  She also reports history of diarrhea, 1-2 loose bowel movements a day sometimes has constipation.  She feels uncomfortable about the situation and anxious.  As above mention she has history of breast cancer, years ago not following up with oncologist at [facility] anymore.  She has history of gastric bypass surgery and thyroid disease.  Denies smoking, drinking or using any drugs.  No personal or family history of GI cancer or pancreatic disease.  Last colonoscopy was done in 2013 showed colonic melanosis.    Last blood work from August 2024 showed normal liver enzyme, mild anemia with hemoglobin 11.2 and hematocrit 37.2    REVIEW OF SYSTEMS  Review of Systems   Constitutional: Negative.  Negative for activity change, appetite change, chills, fatigue, fever and unexpected weight change.   HENT: Negative.     Respiratory: Negative.     Cardiovascular: Negative.  Negative for chest pain and palpitations.   Gastrointestinal: Negative.  Negative for abdominal distention, abdominal pain, anal bleeding, blood in stool, constipation, diarrhea, nausea, rectal pain and vomiting.   Skin: Negative.  Negative for color change and rash.   Neurological: Negative.  Negative for dizziness, tremors, seizures, weakness and headaches.   Psychiatric/Behavioral: Negative.  Negative for confusion.       Allergies   Allergen Reactions    Prednisone Shortness of breath    Codeine Diarrhea and  "Other     Stomach Burning    Hydrocodone-Acetaminophen Unknown    Ibuprofen Unknown    Moxifloxacin Unknown and Hives     Pt states it caused cell disruption    Nsaids (Non-Steroidal Anti-Inflammatory Drug) Other    Other Itching    Promethazine Hives    Vancomycin Unknown     the patient reports that she had \"red man syndrome\" when taking Vancomycin       Past Medical History:   Diagnosis Date    Abnormal finding of blood chemistry, unspecified     Abnormal blood chemistry    Cancer (Multi) 2010    Infection following a procedure, other surgical site, initial encounter     Postoperative wound abscess    Liver disease, unspecified     Liver lesion, right lobe    Morbid (severe) obesity due to excess calories (Multi)     Morbid obesity    Nasal congestion     Head congestion    Other conditions influencing health status     Mass    Other specified disorders of breast     Acquired nipple deformity    Pain in left hip     Left hip pain    Pain in unspecified foot     Foot pain    Pain, unspecified     Tenderness    Personal history of malignant neoplasm of breast     History of breast cancer    Personal history of malignant neoplasm of breast     History of malignant neoplasm of breast    Personal history of other diseases of the circulatory system     History of hypertension    Personal history of other diseases of the digestive system     History of inflammatory bowel disease    Personal history of other diseases of the digestive system     History of ventral hernia    Personal history of other diseases of the musculoskeletal system and connective tissue     History of low back pain    Personal history of other diseases of the musculoskeletal system and connective tissue     History of radicular syndrome of lower extremity    Personal history of other diseases of the musculoskeletal system and connective tissue     History of osteoporosis    Personal history of other diseases of the nervous system and sense organs  "    History of carpal tunnel syndrome    Personal history of other diseases of the nervous system and sense organs     History of sciatica    Personal history of other diseases of the respiratory system     History of sinusitis    Personal history of other endocrine, nutritional and metabolic disease     History of vitamin D deficiency    Personal history of other specified conditions     History of diarrhea    Personal history of other specified conditions     History of motion sickness    Radial head fracture 05/30/2023    Scapholunate dissociation of left wrist 05/30/2023    Strain of muscle, fascia and tendon of lower back, initial encounter     Lumbar strain    Strain of unspecified muscle, fascia and tendon at shoulder and upper arm level, right arm, initial encounter     Right shoulder strain    Unspecified abdominal hernia without obstruction or gangrene     Hernia    Unspecified sprain of left wrist, initial encounter     Sprain of left wrist    Vitamin B12 deficiency anemia, unspecified     Anemia, B12 deficiency       Past Surgical History:   Procedure Laterality Date    BELT ABDOMINOPLASTY  11/04/2014    Abdominoplasty    CT GUIDED PERCUTANEOUS BIOPSY LUNG  01/06/2020    CT GUIDED PERCUTANEOUS BIOPSY LUNG 1/6/2020 [facility]    GASTRIC BYPASS  11/04/2014    Gastric Surgery For Morbid Obesity Gastric Bypass    HERNIA REPAIR  11/04/2014    Hernia Repair    HYSTERECTOMY      LYMPH NODE BIOPSY      MASTECTOMY  11/04/2014    Breast Surgery Mastectomy    OTHER SURGICAL HISTORY  11/04/2014    Breast Reconstruction With Implant Prosthesis Bilateral    OTHER SURGICAL HISTORY  11/04/2014    Breast Reconstruction With Tissue Expander Bilateral    OTHER SURGICAL HISTORY  12/05/2022    Cardiac catheterization    OTHER SURGICAL HISTORY  03/19/2019    Eye surgery    OTHER SURGICAL HISTORY  03/18/2019    Ear pressure equalization tube insertion    OTHER SURGICAL HISTORY  03/18/2019    Yris-en-Y gastric bypass     "OTHER SURGICAL HISTORY  03/18/2019    Hand surgery    OTHER SURGICAL HISTORY  03/18/2019    Hysterectomy    OTHER SURGICAL HISTORY  04/15/2015    Breast Surgery Revision Of Reconstructed Left Breast    SINUS SURGERY      TONSILLECTOMY      TUBAL LIGATION  11/04/2014    Tubal Ligation       Family History   Problem Relation Name Age of Onset    Prostate cancer Father      Heart attack Maternal Grandmother      Stroke Maternal Grandfather Frandy     Cancer Paternal Grandfather Steven        Social History     Tobacco Use    Smoking status: Never    Smokeless tobacco: Never   Substance Use Topics    Alcohol use: Never       Current Outpatient Medications   Medication Sig Dispense Refill    cyanocobalamin (Vitamin B-12) 1,000 mcg/mL injection INJECT 1 ML INTO THE SHOULDER, THIGH, OR BUTTOCKS EVERY 30 DAYS 10 mL 0    cyclobenzaprine (Flexeril) 10 mg tablet Take 1 tablet (10 mg) by mouth as needed at bedtime for muscle spasms. 30 tablet 1    levothyroxine (Synthroid, Levoxyl) 50 mcg tablet Take 1 tablet (50 mcg) by mouth once daily. 90 tablet 3    semaglutide, weight loss, (WEGOVY) 0.25 mg/0.5 mL pen injector Inject 0.25 mg under the skin every 7 days. 2 mL 0    triamterene-hydrochlorothiazid (Dyazide) 37.5-25 mg capsule Take 1 capsule by mouth once daily in the morning.      phentermine (Adipex-P) 37.5 mg tablet Take 1 tablet (37.5 mg) by mouth once daily in the morning. Take before meals. 30 tablet 0     No current facility-administered medications for this visit.       PHYSICAL EXAM:  /82   Pulse 76   Ht 1.727 m (5' 8\")   Wt 82.6 kg (182 lb)   SpO2 98%   BMI 27.67 kg/m²    General appearance: No acute distress, cooperative.  Eyes: Nonicteric, no redness or proptosis  Ears, nose, mouth, and throat: Moist mucous membranes, tongue normal  Neck: Supple, no lymphadenopathy or thyromegaly  Lungs: Clear to auscultation bilaterally  CV: Regular rate and rhythm, no murmur; no pitting edema in the lower " extremities  Abd: soft, non-tender; non-distended; normal active bowel sounds; NO scars  Skin: No rashes or lesions; no liver stigmata  MSK: No deformities or joint edema/redness/tenderness  Neuro: Alert and oriented ×3, normal gait, non-focal NO asterixis    Lab Results   Component Value Date    WBC 6.6 08/21/2024    HGB 11.2 (L) 08/21/2024    HCT 37.2 08/21/2024    MCV 82 08/21/2024     08/21/2024       Lab Results   Component Value Date    ALT 15 08/21/2024    AST 23 08/21/2024    ALKPHOS 94 08/21/2024    BILITOT 1.0 08/21/2024     Lab Results   Component Value Date    INR 1.0 12/13/2019    INR 1.0 05/14/2019    PROTIME 11.7 12/13/2019    PROTIME 11.3 05/14/2019       Lab Results   Component Value Date    IRON 46 06/02/2023    TIBC 424 06/02/2023          ASSESSMENT&PLAN:  Chronic abdominal pain, chronic diarrhea and recent weight loss, of note patient taking weight loss medication.  She has history of breast cancer, have not had any follow-up for more than 3 to 4 years, she has history of gastric bypass surgery, differential diagnosis: The marginal ulcer, esophagitis or peptic ulcer of gastric pouch  Will schedule for upper endoscopy and colonoscopy  Will order CT scan of the abdomen pelvis  Will order stool study to rule out infectious and noninfectious etiology  Will follow-up with her after the workup      Consultation requested by Dr. Allan Tubbs MD.   My final recommendations will be communicated back to the requesting physician by way of shared Medical record or letter to requesting physician via fax.          Signature: Sara De León MD    Date: 10/4/2024  Time: 3:27 PM

## 2024-10-11 ENCOUNTER — CLINICAL SUPPORT (OUTPATIENT)
Dept: AUDIOLOGY | Facility: CLINIC | Age: 59
End: 2024-10-11
Payer: COMMERCIAL

## 2024-10-11 DIAGNOSIS — H90.A32 MIXED CONDUCTIVE AND SENSORINEURAL HEARING LOSS OF LEFT EAR WITH RESTRICTED HEARING OF RIGHT EAR: ICD-10-CM

## 2024-10-11 DIAGNOSIS — H90.A21 SENSORINEURAL HEARING LOSS (SNHL) OF RIGHT EAR WITH RESTRICTED HEARING OF LEFT EAR: Primary | ICD-10-CM

## 2024-10-11 NOTE — PROGRESS NOTES
Ms. Castellanos returned today for the fitting of her left ReSound Nexia 9 ITC hearing aid.    LEFT Serial #[id number]  Service repair & loss/damage warranty expiration: 10/23/2027    Procedure:  DFS was completed and no feedback was noted. Hearing aids were turned on at 90% target gain and she reported this was loud and too sensitive for extraneous sounds.  Target gain was reduced to 80% and expansion was turned on.  She then reported improved sound quality.  Hearing aid was paired to the Smart 3D franco and to the patient's Android phone.  An overview of the franco was completed.    Battery size (312) and life expectancy was reviewed.  Insertion/removal of battery and hearing aid was reviewed and practiced.  Powering on/off the hearing aid was demonstrated.  Low battery warning and tune up alerts were demonstrated.  Some cleaning/maintenance tips were reviewed, including wax trap replacement.  Warranty information was reviewed.     Ms. Castellanos demonstrated understanding of all topics discussed at today's fitting appointment. She is scheduled to return for a hearing aid check, October 25 at 11:30 am.

## 2024-10-14 ENCOUNTER — APPOINTMENT (OUTPATIENT)
Dept: PRIMARY CARE | Facility: CLINIC | Age: 59
End: 2024-10-14
Payer: COMMERCIAL

## 2024-10-14 VITALS
WEIGHT: 183 LBS | HEART RATE: 84 BPM | OXYGEN SATURATION: 99 % | SYSTOLIC BLOOD PRESSURE: 114 MMHG | RESPIRATION RATE: 16 BRPM | DIASTOLIC BLOOD PRESSURE: 68 MMHG | TEMPERATURE: 97.4 F | BODY MASS INDEX: 27.83 KG/M2

## 2024-10-14 DIAGNOSIS — E66.3 OVERWEIGHT WITH BODY MASS INDEX (BMI) OF 27 TO 27.9 IN ADULT: ICD-10-CM

## 2024-10-14 PROCEDURE — 99213 OFFICE O/P EST LOW 20 MIN: CPT | Performed by: FAMILY MEDICINE

## 2024-10-14 PROCEDURE — 1036F TOBACCO NON-USER: CPT | Performed by: FAMILY MEDICINE

## 2024-10-14 NOTE — H&P (VIEW-ONLY)
Subjective   Patient ID: Malathi Castellanos is a 59 y.o. female who presents for Acquired hypothyroidism. I last saw the patient 9/24/2024    HPI     Patient is here for a F/U  Pt gets her FLU vaccine at her work.    Past medical, surgical, and family history reviewed.  Reviewed and documented all medications   Pt eating well, exercising as tolerated and taking medications as directed    Patient is tolerating semaglutide well. She has been at the same weight from her last visit. She would like to increase her semaglutide dose.      Review of Systems  Except positives as noted in the CC & HPI      Constitutional: Denies fevers, chills, night sweats, fatigue, weight changes, change in appetite    Eyes: Denies blurry vision, double vision    ENT: Denies otalgia, trouble hearing, tinnitus, vertigo, nasal congestion, rhinorrhea, sore throat    Neck: Denies swelling, masses    Cardiovascular: Denies chest pain, palpitations, edema, orthopnea, syncope    Respiratory: Denies dyspnea, cough, wheezing, postural nocturnal dyspnea    Gastrointestinal: Denies abdominal pain, nausea, vomiting, diarrhea, constipation, melena, hematochezia    Genitourinary: Denies dysuria, hematuria, frequency, urgency    Musculoskeletal: Denies back pain, arthralgias, myalgias    Integumentary: Denies skin lesions, rashes, masses    Neurological: Denies dizziness, headaches, confusion, limb weakness, paresthesias, syncope, convulsions    Psychiatric: Denies depression, anxiety, homicidal ideations, suicidal ideations, sleep disturbances    Endocrine: Denies polyphagia, polydipsia, polyuria, weakness, hair thinning, heat intolerance, cold intolerance, weight changes    Heme/Lymph: Denies easy bruising, easy bleeding, swollen glands    Objective   /68 (BP Location: Left arm, Patient Position: Sitting, BP Cuff Size: Large adult)   Pulse 84   Temp 36.3 °C (97.4 °F)   Resp 16   Wt 83 kg (183 lb)   SpO2 99%   BMI 27.83 kg/m²     Physical  Exam  114/68 on recheck of BP in the right arm.     Gen. Appearance - well-developed, well-nourished, 59 y.o., White female in no acute distress.     Skin - warm, pink and dry without rash or concerning lesions.     Mental Status - alert and oriented times 3. Normal mood and affect appropriate to mood.     Neck - supple without lymphadenopathy. Carotid pulses are normal without bruits. Thyroid is normal in midline without nodules. Mild tenderness to palpation with induration in the mid trapezius bilaterally.     Chest - lungs are clear to auscultation without rales, rhonchi or wheezes.    Heart - regular, rate, and rhythm without murmurs, rubs or gallops.     Extremities - no cyanosis, clubbing or edema. Pedal pulses are 2+ normal at the dorsalis pedis and posterior tibial pulses bilaterally.     Neurological - cranial nerves II through XII are grossly intact. Motor strength 5/5 at all fours.     Assessment/Plan   1. Overweight with body mass index (BMI) of 27 to 27.9 in adult  semaglutide 0.25 mg or 0.5 mg (2 mg/3 mL) pen injector    DISCONTINUED: semaglutide 0.25 mg or 0.5 mg (2 mg/3 mL) pen injector        Patient to continue current medications (with any exceptions as noted) and diet. Follow-up in 1 month(s) otherwise as needed.      Patient was given samples of Ozempic 0.5 mg subcutaneous weekly.    Consider returning to phentermine at her next visit if needed    Patient's goal weight is 170 pounds.    Scribe Attestation  By signing my name below, ISuzy Scribe   attest that this documentation has been prepared under the direction and in the presence of Allan Tubbs MD.

## 2024-10-21 ENCOUNTER — ANCILLARY PROCEDURE (OUTPATIENT)
Facility: HOSPITAL | Age: 59
End: 2024-10-21
Payer: COMMERCIAL

## 2024-10-21 DIAGNOSIS — R63.4 WEIGHT LOSS: ICD-10-CM

## 2024-10-21 DIAGNOSIS — K52.9 CHRONIC DIARRHEA: ICD-10-CM

## 2024-10-21 DIAGNOSIS — R10.84 GENERALIZED ABDOMINAL PAIN: ICD-10-CM

## 2024-10-21 DIAGNOSIS — Z85.3 HISTORY OF BREAST CANCER: ICD-10-CM

## 2024-10-21 PROCEDURE — 74177 CT ABD & PELVIS W/CONTRAST: CPT | Performed by: RADIOLOGY

## 2024-10-21 PROCEDURE — 74177 CT ABD & PELVIS W/CONTRAST: CPT

## 2024-10-21 PROCEDURE — 2550000001 HC RX 255 CONTRASTS: Performed by: INTERNAL MEDICINE

## 2024-10-23 ENCOUNTER — HOSPITAL ENCOUNTER (OUTPATIENT)
Dept: GASTROENTEROLOGY | Facility: HOSPITAL | Age: 59
Setting detail: OUTPATIENT SURGERY
Discharge: HOME | End: 2024-10-23
Payer: COMMERCIAL

## 2024-10-23 ENCOUNTER — ANESTHESIA EVENT (OUTPATIENT)
Dept: GASTROENTEROLOGY | Facility: HOSPITAL | Age: 59
End: 2024-10-23
Payer: COMMERCIAL

## 2024-10-23 ENCOUNTER — ANESTHESIA (OUTPATIENT)
Dept: GASTROENTEROLOGY | Facility: HOSPITAL | Age: 59
End: 2024-10-23
Payer: COMMERCIAL

## 2024-10-23 VITALS
RESPIRATION RATE: 16 BRPM | DIASTOLIC BLOOD PRESSURE: 81 MMHG | HEART RATE: 86 BPM | SYSTOLIC BLOOD PRESSURE: 130 MMHG | OXYGEN SATURATION: 99 % | TEMPERATURE: 97.9 F

## 2024-10-23 DIAGNOSIS — R63.4 WEIGHT LOSS: Primary | ICD-10-CM

## 2024-10-23 DIAGNOSIS — Z85.3 HISTORY OF BREAST CANCER: ICD-10-CM

## 2024-10-23 DIAGNOSIS — R10.84 GENERALIZED ABDOMINAL PAIN: ICD-10-CM

## 2024-10-23 DIAGNOSIS — K52.9 CHRONIC DIARRHEA: ICD-10-CM

## 2024-10-23 PROCEDURE — 45385 COLONOSCOPY W/LESION REMOVAL: CPT | Performed by: INTERNAL MEDICINE

## 2024-10-23 PROCEDURE — 3700000002 HC GENERAL ANESTHESIA TIME - EACH INCREMENTAL 1 MINUTE

## 2024-10-23 PROCEDURE — 7100000009 HC PHASE TWO TIME - INITIAL BASE CHARGE

## 2024-10-23 PROCEDURE — 43239 EGD BIOPSY SINGLE/MULTIPLE: CPT | Performed by: INTERNAL MEDICINE

## 2024-10-23 PROCEDURE — 2500000005 HC RX 250 GENERAL PHARMACY W/O HCPCS: Performed by: ANESTHESIOLOGIST ASSISTANT

## 2024-10-23 PROCEDURE — 2500000004 HC RX 250 GENERAL PHARMACY W/ HCPCS (ALT 636 FOR OP/ED): Performed by: INTERNAL MEDICINE

## 2024-10-23 PROCEDURE — A45385 PR COLONOSCOPY,REMV LESN,SNARE: Performed by: ANESTHESIOLOGIST ASSISTANT

## 2024-10-23 PROCEDURE — 2500000004 HC RX 250 GENERAL PHARMACY W/ HCPCS (ALT 636 FOR OP/ED): Performed by: ANESTHESIOLOGIST ASSISTANT

## 2024-10-23 PROCEDURE — A45385 PR COLONOSCOPY,REMV LESN,SNARE: Performed by: ANESTHESIOLOGY

## 2024-10-23 PROCEDURE — 7100000010 HC PHASE TWO TIME - EACH INCREMENTAL 1 MINUTE

## 2024-10-23 PROCEDURE — 2500000001 HC RX 250 WO HCPCS SELF ADMINISTERED DRUGS (ALT 637 FOR MEDICARE OP): Performed by: INTERNAL MEDICINE

## 2024-10-23 PROCEDURE — 3700000001 HC GENERAL ANESTHESIA TIME - INITIAL BASE CHARGE

## 2024-10-23 PROCEDURE — 2720000007 HC OR 272 NO HCPCS

## 2024-10-23 RX ORDER — HYDROMORPHONE HYDROCHLORIDE 1 MG/ML
0.4 INJECTION, SOLUTION INTRAMUSCULAR; INTRAVENOUS; SUBCUTANEOUS
Status: DISCONTINUED | OUTPATIENT
Start: 2024-10-23 | End: 2024-10-24 | Stop reason: HOSPADM

## 2024-10-23 RX ORDER — DEXTROMETHORPHAN/PSEUDOEPHED 2.5-7.5/.8
DROPS ORAL AS NEEDED
Status: COMPLETED | OUTPATIENT
Start: 2024-10-23 | End: 2024-10-23

## 2024-10-23 RX ORDER — MINERAL OIL
180 ENEMA (ML) RECTAL DAILY
COMMUNITY

## 2024-10-23 RX ORDER — PROPOFOL 10 MG/ML
INJECTION, EMULSION INTRAVENOUS AS NEEDED
Status: DISCONTINUED | OUTPATIENT
Start: 2024-10-23 | End: 2024-10-23

## 2024-10-23 RX ORDER — ONDANSETRON HYDROCHLORIDE 2 MG/ML
INJECTION, SOLUTION INTRAVENOUS AS NEEDED
Status: DISCONTINUED | OUTPATIENT
Start: 2024-10-23 | End: 2024-10-23

## 2024-10-23 RX ORDER — FENTANYL CITRATE 50 UG/ML
INJECTION, SOLUTION INTRAMUSCULAR; INTRAVENOUS AS NEEDED
Status: DISCONTINUED | OUTPATIENT
Start: 2024-10-23 | End: 2024-10-23

## 2024-10-23 RX ORDER — LIDOCAINE HYDROCHLORIDE 20 MG/ML
INJECTION, SOLUTION EPIDURAL; INFILTRATION; INTRACAUDAL; PERINEURAL AS NEEDED
Status: DISCONTINUED | OUTPATIENT
Start: 2024-10-23 | End: 2024-10-23

## 2024-10-23 SDOH — HEALTH STABILITY: MENTAL HEALTH: CURRENT SMOKER: 0

## 2024-10-23 ASSESSMENT — PAIN - FUNCTIONAL ASSESSMENT: PAIN_FUNCTIONAL_ASSESSMENT: 0-10

## 2024-10-23 ASSESSMENT — PAIN SCALES - GENERAL
PAINLEVEL_OUTOF10: 5 - MODERATE PAIN
PAINLEVEL_OUTOF10: 2
PAINLEVEL_OUTOF10: 6
PAINLEVEL_OUTOF10: 0 - NO PAIN

## 2024-10-23 ASSESSMENT — COLUMBIA-SUICIDE SEVERITY RATING SCALE - C-SSRS
1. IN THE PAST MONTH, HAVE YOU WISHED YOU WERE DEAD OR WISHED YOU COULD GO TO SLEEP AND NOT WAKE UP?: NO
2. HAVE YOU ACTUALLY HAD ANY THOUGHTS OF KILLING YOURSELF?: NO
6. HAVE YOU EVER DONE ANYTHING, STARTED TO DO ANYTHING, OR PREPARED TO DO ANYTHING TO END YOUR LIFE?: NO

## 2024-10-23 NOTE — DISCHARGE INSTRUCTIONS
Patient Instructions after a Colonoscopy      The anesthetics, sedatives or narcotics which were given to you today will be acting in your body for the next 24 hours, so you might feel a little sleepy or groggy.  This feeling should slowly wear off. Carefully read and follow the instructions.     You received sedation today:  - Do not drive or operate any machinery or power tools of any kind.   - No alcoholic beverages today, not even beer or wine.  - Do not make any important decisions or sign any legal documents.  - No over the counter medications that contain alcohol or that may cause drowsiness.  - Do not make any important decisions or sign any legal documents.  - Make sure you have someone with you for first 24 hours.    While it is common to experience mild to moderate abdominal distention, gas, or belching after your procedure, if any of these symptoms occur following discharge from the GI Lab or within one week of having your procedure, call the [facility] to be advised whether a visit to your nearest Urgent Care or Emergency Department is indicated.  Take this paper with you if you go.     - If you develop an allergic reaction to the medications that were given during your procedure such as difficulty breathing, rash, hives, severe nausea, vomiting or lightheadedness.  - If you experience chest pain, shortness of breath, severe abdominal pain, fevers and chills.  -If you develop signs and symptoms of bleeding such as blood in your spit, if your stools turn black, tarry, or bloody  - If you have not urinated within 8 hours following your procedure.  - If your IV site becomes painful, red, inflamed, or looks infected.    If you received a biopsy/polypectomy/sphincterotomy the following instructions apply below:    __ Do not use Aspirin containing products, non-steroidal medications or anti-coagulants for one week following your procedure. (Examples of these types of medications are: Advil,  Arthrotec, Aleve, Coumadin, Ecotrin, Heparin, Ibuprofen, Indocin, Motrin, Naprosyn, Nuprin, Plavix, Vioxx, and Voltarin, or their generic forms.  This list is not all-inclusive.  Check with your physician or pharmacist before resuming medications.)   __ Eat a soft diet today.  Avoid foods that are poorly digested for the next 24 hours.  These foods would include: nuts, beans, lettuce, red meats, and fried foods. Start with liquids and advance your diet as tolerated, gradually work up to eating solids.   __ Do not have a Barium Study or Enema for one week.    Your physician recommends the additional following instructions:    -You have a contact number available for emergencies. The signs and symptoms of potential delayed complications were discussed with you. You may return to normal activities tomorrow.  -Resume your previous diet.  -Continue your present medications.   -We are waiting for your pathology results.  -Your physician has recommended a repeat colonoscopy (date to be determined after pending pathology results are reviewed) for surveillance based on pathology results.  -The findings and recommendations have been discussed with you.  -The findings and recommendations were discussed with your family.  - Please see Medication Reconciliation Form for new medication/medications prescribed.       If you experience any problems or have any questions following discharge from the GI Lab, please call:        Nurse Signature                                                                        Date___________________                                                                            Patient/Responsible Party Signature                                        Date___________________

## 2024-10-23 NOTE — ANESTHESIA POSTPROCEDURE EVALUATION
Patient: Malathi Castellanos    Procedure Summary       Date: 10/23/24 Room / Location: [facility] [place]    Anesthesia Start: 1501 Anesthesia Stop: 1555    Procedures:       COLONOSCOPY      EGD Diagnosis:       Weight loss      History of breast cancer      Generalized abdominal pain      Chronic diarrhea    Scheduled Providers: Sara De León MD Responsible Provider: Melva Cleveland MD    Anesthesia Type: MAC ASA Status: 2            Anesthesia Type: MAC    Vitals Value Taken Time   /73 10/23/24 1555   Temp 36 10/23/24 1555   Pulse 95 10/23/24 1555   Resp 12 10/23/24 1555   SpO2 100 10/23/24 1555       Anesthesia Post Evaluation    Patient location during evaluation: PACU  Patient participation: complete - patient participated  Level of consciousness: awake and alert  Pain management: satisfactory to patient  Airway patency: patent  Cardiovascular status: acceptable  Respiratory status: acceptable, room air and spontaneous ventilation  Hydration status: acceptable  Postoperative Nausea and Vomiting: none        No notable events documented.

## 2024-10-23 NOTE — ANESTHESIA PREPROCEDURE EVALUATION
Patient: Malathi Castellanos    Procedure Information       Date/Time: 10/23/24 1410    Scheduled providers: Sara De León MD    Procedures:       COLONOSCOPY      EGD    Location: [facility]            Relevant Problems   Pulmonary   (+) Pulmonary nodules      Endocrine   (+) Acquired hypothyroidism      Hematology   (+) Anemia      HEENT   (+) Chronic sinusitis   (+) Mixed hearing loss of left ear   (+) Sensorineural hearing loss of right ear       Clinical information reviewed:   Tobacco  Allergies  Meds   Med Hx  Surg Hx  OB Status  Fam Hx  Soc   Hx        NPO Detail:  NPO/Void Status  Date of Last Liquid: 10/23/24  Time of Last Liquid: 0900  Date of Last Solid: 10/21/24  Time of Last Solid: 0830  Last Intake Type: Clear fluids  Time of Last Void: 1330         Physical Exam    Airway  Mallampati: II  TM distance: >3 FB  Neck ROM: full     Cardiovascular - normal exam     Dental - normal exam     Pulmonary - normal exam     Abdominal - normal exam             Anesthesia Plan    History of general anesthesia?: yes  History of complications of general anesthesia?: no    ASA 2     MAC     The patient is not a current smoker.    intravenous induction   Anesthetic plan and risks discussed with patient.    Plan discussed with [facility].

## 2024-10-25 ENCOUNTER — APPOINTMENT (OUTPATIENT)
Dept: AUDIOLOGY | Facility: CLINIC | Age: 59
End: 2024-10-25
Payer: COMMERCIAL

## 2024-10-25 DIAGNOSIS — H90.A32 MIXED CONDUCTIVE AND SENSORINEURAL HEARING LOSS OF LEFT EAR WITH RESTRICTED HEARING OF RIGHT EAR: ICD-10-CM

## 2024-10-25 DIAGNOSIS — H90.A21 SENSORINEURAL HEARING LOSS (SNHL) OF RIGHT EAR WITH RESTRICTED HEARING OF LEFT EAR: Primary | ICD-10-CM

## 2024-10-25 NOTE — PROGRESS NOTES
Mrs. Castellanos returned today for the two week check on her left ReSound Nexia 9 312 ITC hearing aid.  She reported difficulty hearing a speaker on the opposite side of a reception table at a recent wedding she attended.  She also reported significant difficulty hearing on the phone which is worse with landline phones at work.    Procedure:  Target gain was increased from 80% to 100%.  Acoustic phone gain was further increased.  The need for possible t-coil was discussed.  Complete insertion of the device in her canal was reviewed as this is the suspected reason for some intermittent cutting out of the device which she stated happened at times when bending over.  She plans to update me at the end of next week and return office visit will be scheduled as needed accordingly.

## 2024-10-30 ASSESSMENT — PATIENT HEALTH QUESTIONNAIRE - PHQ9
3. TROUBLE FALLING OR STAYING ASLEEP OR SLEEPING TOO MUCH: NOT AT ALL
7. TROUBLE CONCENTRATING ON THINGS, SUCH AS READING THE NEWSPAPER OR WATCHING TELEVISION: NOT AT ALL
2. FEELING DOWN, DEPRESSED OR HOPELESS: NOT AT ALL
3. TROUBLE FALLING OR STAYING ASLEEP: NOT AT ALL
1. LITTLE INTEREST OR PLEASURE IN DOING THINGS: NOT AT ALL
6. FEELING BAD ABOUT YOURSELF - OR THAT YOU ARE A FAILURE OR HAVE LET YOURSELF OR YOUR FAMILY DOWN: NOT AT ALL
8. MOVING OR SPEAKING SO SLOWLY THAT OTHER PEOPLE COULD HAVE NOTICED. OR THE OPPOSITE, BEING SO FIGETY OR RESTLESS THAT YOU HAVE BEEN MOVING AROUND A LOT MORE THAN USUAL: NOT AT ALL
9. THOUGHTS THAT YOU WOULD BE BETTER OFF DEAD, OR OF HURTING YOURSELF: NOT AT ALL
2. FEELING DOWN, DEPRESSED OR HOPELESS: NOT AT ALL
SUM OF ALL RESPONSES TO PHQ9 QUESTIONS 1 & 2: 0
6. FEELING BAD ABOUT YOURSELF - OR THAT YOU ARE A FAILURE OR HAVE LET YOURSELF OR YOUR FAMILY DOWN: NOT AT ALL
8. MOVING OR SPEAKING SO SLOWLY THAT OTHER PEOPLE COULD HAVE NOTICED. OR THE OPPOSITE - BEING SO FIDGETY OR RESTLESS THAT YOU HAVE BEEN MOVING AROUND A LOT MORE THAN USUAL: NOT AT ALL
5. POOR APPETITE OR OVEREATING: NOT AT ALL
4. FEELING TIRED OR HAVING LITTLE ENERGY: NOT AT ALL
7. TROUBLE CONCENTRATING ON THINGS, SUCH AS READING THE NEWSPAPER OR WATCHING TELEVISION: NOT AT ALL
SUM OF ALL RESPONSES TO PHQ QUESTIONS 1-9: 0
4. FEELING TIRED OR HAVING LITTLE ENERGY: NOT AT ALL
5. POOR APPETITE OR OVEREATING: NOT AT ALL
9. THOUGHTS THAT YOU WOULD BE BETTER OFF DEAD, OR OF HURTING YOURSELF: NOT AT ALL
10. IF YOU CHECKED OFF ANY PROBLEMS, HOW DIFFICULT HAVE THESE PROBLEMS MADE IT FOR YOU TO DO YOUR WORK, TAKE CARE OF THINGS AT HOME, OR GET ALONG WITH OTHER PEOPLE: NOT DIFFICULT AT ALL
10. IF YOU CHECKED OFF ANY PROBLEMS, HOW DIFFICULT HAVE THESE PROBLEMS MADE IT FOR YOU TO DO YOUR WORK, TAKE CARE OF THINGS AT HOME, OR GET ALONG WITH OTHER PEOPLE: NOT DIFFICULT AT ALL
1. LITTLE INTEREST OR PLEASURE IN DOING THINGS: NOT AT ALL

## 2024-11-01 ENCOUNTER — OFFICE VISIT (OUTPATIENT)
Dept: SURGERY | Facility: CLINIC | Age: 59
End: 2024-11-01
Payer: COMMERCIAL

## 2024-11-01 ENCOUNTER — NUTRITION (OUTPATIENT)
Dept: SURGERY | Facility: CLINIC | Age: 59
End: 2024-11-01
Payer: COMMERCIAL

## 2024-11-01 VITALS
WEIGHT: 182.4 LBS | SYSTOLIC BLOOD PRESSURE: 133 MMHG | OXYGEN SATURATION: 98 % | HEIGHT: 67 IN | DIASTOLIC BLOOD PRESSURE: 83 MMHG | BODY MASS INDEX: 28.63 KG/M2 | HEART RATE: 74 BPM

## 2024-11-01 DIAGNOSIS — E66.3 OVERWEIGHT WITH BODY MASS INDEX (BMI) OF 27 TO 27.9 IN ADULT: ICD-10-CM

## 2024-11-01 DIAGNOSIS — Z98.84 S/P GASTRIC BYPASS: ICD-10-CM

## 2024-11-01 DIAGNOSIS — E53.8 VITAMIN B12 DEFICIENCY: ICD-10-CM

## 2024-11-01 DIAGNOSIS — K56.1: Primary | ICD-10-CM

## 2024-11-01 DIAGNOSIS — Z71.3 PRE-BARIATRIC SURGERY NUTRITION EVALUATION: ICD-10-CM

## 2024-11-01 DIAGNOSIS — R10.10 UPPER ABDOMINAL PAIN: ICD-10-CM

## 2024-11-01 DIAGNOSIS — Z13.21 ENCOUNTER FOR VITAMIN DEFICIENCY SCREENING: ICD-10-CM

## 2024-11-01 DIAGNOSIS — Z98.84 HISTORY OF GASTRIC BYPASS: ICD-10-CM

## 2024-11-01 PROCEDURE — 3008F BODY MASS INDEX DOCD: CPT | Performed by: SURGERY

## 2024-11-01 PROCEDURE — 99205 OFFICE O/P NEW HI 60 MIN: CPT | Performed by: SURGERY

## 2024-11-01 PROCEDURE — 99215 OFFICE O/P EST HI 40 MIN: CPT | Performed by: SURGERY

## 2024-11-01 PROCEDURE — 1036F TOBACCO NON-USER: CPT | Performed by: SURGERY

## 2024-11-01 RX ORDER — OMEPRAZOLE 40 MG/1
40 CAPSULE, DELAYED RELEASE ORAL DAILY
Qty: 30 CAPSULE | Refills: 2 | Status: SHIPPED | OUTPATIENT
Start: 2024-11-01 | End: 2025-01-30

## 2024-11-06 PROCEDURE — RXMED WILLOW AMBULATORY MEDICATION CHARGE

## 2024-11-07 LAB
LABORATORY COMMENT REPORT: NORMAL
PATH REPORT.FINAL DX SPEC: NORMAL
PATH REPORT.GROSS SPEC: NORMAL
PATH REPORT.RELEVANT HX SPEC: NORMAL
PATH REPORT.TOTAL CANCER: NORMAL

## 2024-11-08 ENCOUNTER — HOSPITAL ENCOUNTER (OUTPATIENT)
Dept: RADIOLOGY | Facility: HOSPITAL | Age: 59
Discharge: HOME | End: 2024-11-08
Payer: COMMERCIAL

## 2024-11-08 DIAGNOSIS — E66.3 OVERWEIGHT WITH BODY MASS INDEX (BMI) OF 27 TO 27.9 IN ADULT: ICD-10-CM

## 2024-11-08 DIAGNOSIS — Z98.84 S/P GASTRIC BYPASS: ICD-10-CM

## 2024-11-08 PROCEDURE — 74177 CT ABD & PELVIS W/CONTRAST: CPT

## 2024-11-08 PROCEDURE — 2550000001 HC RX 255 CONTRASTS: Performed by: SURGERY

## 2024-11-08 PROCEDURE — A9698 NON-RAD CONTRAST MATERIALNOC: HCPCS | Performed by: SURGERY

## 2024-11-08 NOTE — PROGRESS NOTES
BARIATRIC SURGERY   HISTORY AND PHYSICAL/CONSULT    Date: 11/8/2024 Time: 9:40 AM    Name: Malathi Castellanos  MRN: [id number]    Virtual or Telephone Consent    An interactive audio and video telecommunication system which permits real time communications between the patient (at the originating site) and provider (at the distant site) was utilized to provide this telehealth service.   Verbal consent was requested and obtained from Malathi Castellanos on this date, 11/14/24 for a telehealth visit.       HPI: Malatih Castellanos is a 59 y.o. female with a remote history of RNY GBP approximately 20 years ago with complaints of dull LUQ pain for about the last year or so. Patient presenting today as a follow up to review results of repeat CT abdomen & pelvis with p.o. and IV contrast to rule out persistent intussusception.    11/8/24 CT abdomen pelvis w IV contrast     IMPRESSION:  Postoperative changes of Yris-en-Y gastric bypass again seen.      Nonspecific mild circumferential wall thickening again demonstrated  at the Yris limb distal anastomosis of small bowel with short segment  intussusception at the anastomosis level. Contrast opacification is  seen in the region of this anastomosis and extends into more distal  nondilated small bowel segments without evidence of small-bowel  obstruction.      Moderate intrahepatic and extrahepatic biliary dilation similar to  prior as well as mild dilation of the main pancreatic duct. Mild  biliary dilation may be chronic related to the cholecystectomy,  ampullary level obstructing process can not be fully excluded.      Left renal lower pole 2 mm nonobstructing calculus. No hydronephrosis  bilaterally.    HxHPI from LOV 11/1/2024:  Malathi Castellanos is a 59 y.o. female with a remote history of RNY GBP approximately 20 years ago. Patient has been seeing GI specialist Dr. De León with complaints of dull LUQ pain for about the last year or so.  Dr. De León ordered a CT and performed an EGD (below) and  patient is here today to establish care with a bariatric surgeon.  Nutritional labs obtained by PCP in August of this year.     Symptoms are in the right side of the abdomen and intussusception and anastomosis seems to be more towards midline and left upper quadrant area however symptoms could be related to transient intussusception.  Does not seem to be a permanent problem otherwise he would be obstructed.  Although no obvious ulceration was noted on the endoscopy however gastrojejunostomy pictures were reviewed from the EGD and looks pretty fragile and erythematous.  An underlying ulcer cannot be ruled out in my opinion and could be contributing to abdominal pain symptoms.  She does not take regular vitamin supplements and her diet is little bit off as well.  We discussed all these things at length.  I think the CT findings are transient however need to be confirmed with a repeat CT scan as recommended by radiologist.    Meanwhile she desires additional weight loss as well and she was on semaglutide which her insurance did not cover and completed phentermine.  She has maintained her weight around 180 pounds for a while now.  Her highest weight was 300 pounds prior to gastric bypass.  Overall I think she can continue weight loss medications and no additional weight loss surgery is indicated.  She would benefit from following up with dietitian and modifying her diet, watching her caloric intake and doing some more exercise.    PLAN:  Will start trial of omeprazole 40 mg once daily  Will get bariatric micronutrient labs  Advised her to see dietitian  Advised her to take multivitamin twice daily, calcium and citrate and vitamin D3 2-3 times every day  Continue B12 injections    Will repeat CT scan of abdomen pelvis with p.o. and IV contrast to rule out persistent intussusception.  Follow-up after above, to ER with any acute complaints.     PAST MEDICAL HISTORY:  Patient Active Problem List   Diagnosis    Absence of  breast, acquired    Acquired absence of both nipples    Acute otitis media with perforation, left    Acute serous otitis media of right ear without rupture    Central perforation of tympanic membrane    Chronic sinusitis    Acquired hypothyroidism    Lipoma    Lung granuloma (Multi)    Pulmonary nodules    Menieres disease    Mixed hearing loss of left ear    Nasal obstruction    Nasal polyps    S/P gastric bypass    Sensorineural hearing loss of right ear    Vitamin B12 deficiency    Overweight with body mass index (BMI) of 27 to 27.9 in adult    Anemia    Otalgia, right      Past Medical History:   Diagnosis Date    Abnormal finding of blood chemistry, unspecified     Abnormal blood chemistry    Breast cancer (Multi) 2010    Cancer (Multi) 2010    Infection following a procedure, other surgical site, initial encounter     Postoperative wound abscess    Liver disease, unspecified     Liver lesion, right lobe    Morbid (severe) obesity due to excess calories (Multi)     Morbid obesity    Nasal congestion     Head congestion    Other conditions influencing health status     Mass    Other specified disorders of breast     Acquired nipple deformity    Pain in left hip     Left hip pain    Pain in unspecified foot     Foot pain    Pain, unspecified     Tenderness    Personal history of malignant neoplasm of breast     History of breast cancer    Personal history of malignant neoplasm of breast     History of malignant neoplasm of breast    Personal history of other diseases of the circulatory system     History of hypertension    Personal history of other diseases of the digestive system     History of inflammatory bowel disease    Personal history of other diseases of the digestive system     History of ventral hernia    Personal history of other diseases of the musculoskeletal system and connective tissue     History of low back pain    Personal history of other diseases of the musculoskeletal system and connective  tissue     History of radicular syndrome of lower extremity    Personal history of other diseases of the musculoskeletal system and connective tissue     History of osteoporosis    Personal history of other diseases of the nervous system and sense organs     History of carpal tunnel syndrome    Personal history of other diseases of the nervous system and sense organs     History of sciatica    Personal history of other diseases of the respiratory system     History of sinusitis    Personal history of other endocrine, nutritional and metabolic disease     History of vitamin D deficiency    Personal history of other specified conditions     History of diarrhea    Personal history of other specified conditions     History of motion sickness    Radial head fracture 05/30/2023    Scapholunate dissociation of left wrist 05/30/2023    Strain of muscle, fascia and tendon of lower back, initial encounter     Lumbar strain    Strain of unspecified muscle, fascia and tendon at shoulder and upper arm level, right arm, initial encounter     Right shoulder strain    Unspecified abdominal hernia without obstruction or gangrene     Hernia    Unspecified sprain of left wrist, initial encounter     Sprain of left wrist    Vitamin B12 deficiency anemia, unspecified     Anemia, B12 deficiency        PAST SURGICAL HISTORY:  Past Surgical History:   Procedure Laterality Date    BELT ABDOMINOPLASTY  11/04/2014    Abdominoplasty    CHOLECYSTECTOMY      CT GUIDED PERCUTANEOUS BIOPSY LUNG  01/06/2020    CT GUIDED PERCUTANEOUS BIOPSY LUNG 1/6/2020 [facility]    GASTRIC BYPASS  11/04/2014    Gastric Surgery For Morbid Obesity Gastric Bypass    HERNIA REPAIR  11/04/2014    Hernia Repair    HYSTERECTOMY      LYMPH NODE BIOPSY      MASTECTOMY  11/04/2014    Breast Surgery Mastectomy    OTHER SURGICAL HISTORY  11/04/2014    Breast Reconstruction With Implant Prosthesis Bilateral    OTHER SURGICAL HISTORY  11/04/2014    Breast Reconstruction  With Tissue Expander Bilateral    OTHER SURGICAL HISTORY  12/05/2022    Cardiac catheterization    OTHER SURGICAL HISTORY  03/19/2019    Eye surgery    OTHER SURGICAL HISTORY  03/18/2019    Ear pressure equalization tube insertion    OTHER SURGICAL HISTORY  03/18/2019    Yris-en-Y gastric bypass    OTHER SURGICAL HISTORY  03/18/2019    Hand surgery    OTHER SURGICAL HISTORY  03/18/2019    Hysterectomy    OTHER SURGICAL HISTORY  04/15/2015    Breast Surgery Revision Of Reconstructed Left Breast    SINUS SURGERY      TONSILLECTOMY      TUBAL LIGATION  11/04/2014    Tubal Ligation       FAMILY HISTORY:  Family History   Problem Relation Name Age of Onset    Prostate cancer Father      Heart attack Maternal Grandmother      Stroke Maternal Grandfather Frandy     Cancer Paternal Grandfather Steven         SOCIAL HISTORY:  Social History     Tobacco Use    Smoking status: Former     Types: Cigarettes    Smokeless tobacco: Never   Vaping Use    Vaping status: Never Used   Substance Use Topics    Alcohol use: Not Currently     Comment: social    Drug use: Never       MEDICATIONS:  Prior to Admission Medications:    Current Outpatient Medications:     cyanocobalamin (Vitamin B-12) 1,000 mcg/mL injection, INJECT 1 ML INTO THE SHOULDER, THIGH, OR BUTTOCKS EVERY 30 DAYS, Disp: 10 mL, Rfl: 0    fexofenadine (Allegra) 180 mg tablet, Take 1 tablet (180 mg) by mouth once daily., Disp: , Rfl:     levothyroxine (Synthroid, Levoxyl) 50 mcg tablet, Take 1 tablet (50 mcg) by mouth once daily., Disp: 90 tablet, Rfl: 3    omeprazole (PriLOSEC) 40 mg DR capsule, Take 1 capsule (40 mg) by mouth once daily. Open Capsule, sprinkle over sugar free applesauce or pudding - Do not crush or chew., Disp: 30 capsule, Rfl: 2    triamterene-hydrochlorothiazid (Dyazide) 37.5-25 mg capsule, Take 1 capsule by mouth once daily in the morning., Disp: , Rfl:   No current facility-administered medications for this visit.    ALLERGIES:  Allergies   Allergen  "Reactions    Prednisone Shortness of breath    Promethazine Hives    Codeine Diarrhea and Other     Stomach Burning    Hydrocodone-Acetaminophen Other     \"Stomach burns\"    Ibuprofen Other     Cannot take due to gastric bypass    Moxifloxacin Hives and Unknown     Pt states it caused cell disruption    Nsaids (Non-Steroidal Anti-Inflammatory Drug) Other     Cannot take due to gastric bypass      Vancomycin Rash     the patient reports that she had \"red man syndrome\" when taking Vancomycin       REVIEW OF SYSTEMS:  GENERAL: Negative for malaise, significant weight loss and fever  NECK: Negative for lumps, goiter, pain and significant neck swelling  RESPIRATORY: Negative for cough, wheezing or shortness of breath.  CARDIOVASCULAR: Negative for chest pain, leg swelling or palpitations.  GI: Continues to have upper abdominal pain along with nausea and vomiting, last episode was Tuesday and was constipated as well but it is intermittent.  : No history of dysuria, frequency or incontinence  MUSCULOSKELETAL: Negative for joint pain or swelling, back pain or muscle pain.  SKIN: Negative for lesions, rash, and itching.  PSYCH: Negative for sleep disturbance, mood disorder and recent psychosocial stressors.  ENDOCRINE: Negative for cold or heat intolerance, polyuria, polydipsia and goiter.    PHYSICAL EXAM:    IMPRESSION:  Malathi Castellanos is a 59 y.o. female with a remote history of RNY GBP approximately 20 years ago with complaints of dull LUQ pain for about the last year or so. Patient presenting today as a follow up to review results of repeat CT abdomen & pelvis with p.o. and IV contrast to rule out persistent intussusception.    Discussed the CT findings with the patient.    She still has a small segment intussusception at the jejunojejunostomy and continues to have abdominal pain and p.o. intolerance issues although they are intermittent.  Most likely she is having intermittent partial obstruction episodes resulting in " the symptoms.    I have offered her to come to emergency room today or tomorrow versus trying to get it on elective basis.  She thinks she is not feeling bad enough to come to emergency room however agrees to proceed with diagnostic laparoscopy and revision of her jejunojejunostomy.  Explained the risk of surgery including bleeding, infection, leak, chronic symptoms, persistent symptoms, recurrent intussusception, protein calorie malnutrition, vitamin deficiency, internal hernia, cardiopulmonary complications etc. and she voiced understanding and agreement to proceed with proposed diagnostic laparoscopy and revision of jejunojejunostomy.  Also discussed the possibility of lengthening of her biliopancreatic limb because we have to revise the jejunojejunostomy anyway and she expressed a keen interest in that as well and potential risk of more malabsorption and requirement of more vitamins in future discussed with the patient she voiced understanding and agreement.  Advised her to stop her Adipex for now.  Will plan for diagnostic laparoscopy with revision of jejunojejunostomy and related procedures next Wednesday or as scheduling allows.  Also advised the patient to come to emergency room if symptoms get worse.  She voiced understanding and agreement.    Leatha Hamilton MD  Bariatric and Minimally Invasive General Surgery

## 2024-11-08 NOTE — H&P (VIEW-ONLY)
BARIATRIC SURGERY   HISTORY AND PHYSICAL/CONSULT    Date: 11/8/2024 Time: 9:40 AM    Name: Malathi Castellanos  MRN: [id number]    Virtual or Telephone Consent    An interactive audio and video telecommunication system which permits real time communications between the patient (at the originating site) and provider (at the distant site) was utilized to provide this telehealth service.   Verbal consent was requested and obtained from Malathi Castellanos on this date, 11/14/24 for a telehealth visit.       HPI: Malathi Castellanos is a 59 y.o. female with a remote history of RNY GBP approximately 20 years ago with complaints of dull LUQ pain for about the last year or so. Patient presenting today as a follow up to review results of repeat CT abdomen & pelvis with p.o. and IV contrast to rule out persistent intussusception.    11/8/24 CT abdomen pelvis w IV contrast     IMPRESSION:  Postoperative changes of Yris-en-Y gastric bypass again seen.      Nonspecific mild circumferential wall thickening again demonstrated  at the Yris limb distal anastomosis of small bowel with short segment  intussusception at the anastomosis level. Contrast opacification is  seen in the region of this anastomosis and extends into more distal  nondilated small bowel segments without evidence of small-bowel  obstruction.      Moderate intrahepatic and extrahepatic biliary dilation similar to  prior as well as mild dilation of the main pancreatic duct. Mild  biliary dilation may be chronic related to the cholecystectomy,  ampullary level obstructing process can not be fully excluded.      Left renal lower pole 2 mm nonobstructing calculus. No hydronephrosis  bilaterally.    HxHPI from LOV 11/1/2024:  Malathi Castellanos is a 59 y.o. female with a remote history of RNY GBP approximately 20 years ago. Patient has been seeing GI specialist Dr. De León with complaints of dull LUQ pain for about the last year or so.  Dr. De León ordered a CT and performed an EGD (below) and  patient is here today to establish care with a bariatric surgeon.  Nutritional labs obtained by PCP in August of this year.     Symptoms are in the right side of the abdomen and intussusception and anastomosis seems to be more towards midline and left upper quadrant area however symptoms could be related to transient intussusception.  Does not seem to be a permanent problem otherwise he would be obstructed.  Although no obvious ulceration was noted on the endoscopy however gastrojejunostomy pictures were reviewed from the EGD and looks pretty fragile and erythematous.  An underlying ulcer cannot be ruled out in my opinion and could be contributing to abdominal pain symptoms.  She does not take regular vitamin supplements and her diet is little bit off as well.  We discussed all these things at length.  I think the CT findings are transient however need to be confirmed with a repeat CT scan as recommended by radiologist.    Meanwhile she desires additional weight loss as well and she was on semaglutide which her insurance did not cover and completed phentermine.  She has maintained her weight around 180 pounds for a while now.  Her highest weight was 300 pounds prior to gastric bypass.  Overall I think she can continue weight loss medications and no additional weight loss surgery is indicated.  She would benefit from following up with dietitian and modifying her diet, watching her caloric intake and doing some more exercise.    PLAN:  Will start trial of omeprazole 40 mg once daily  Will get bariatric micronutrient labs  Advised her to see dietitian  Advised her to take multivitamin twice daily, calcium and citrate and vitamin D3 2-3 times every day  Continue B12 injections    Will repeat CT scan of abdomen pelvis with p.o. and IV contrast to rule out persistent intussusception.  Follow-up after above, to ER with any acute complaints.     PAST MEDICAL HISTORY:  Patient Active Problem List   Diagnosis    Absence of  breast, acquired    Acquired absence of both nipples    Acute otitis media with perforation, left    Acute serous otitis media of right ear without rupture    Central perforation of tympanic membrane    Chronic sinusitis    Acquired hypothyroidism    Lipoma    Lung granuloma (Multi)    Pulmonary nodules    Menieres disease    Mixed hearing loss of left ear    Nasal obstruction    Nasal polyps    S/P gastric bypass    Sensorineural hearing loss of right ear    Vitamin B12 deficiency    Overweight with body mass index (BMI) of 27 to 27.9 in adult    Anemia    Otalgia, right      Past Medical History:   Diagnosis Date    Abnormal finding of blood chemistry, unspecified     Abnormal blood chemistry    Breast cancer (Multi) 2010    Cancer (Multi) 2010    Infection following a procedure, other surgical site, initial encounter     Postoperative wound abscess    Liver disease, unspecified     Liver lesion, right lobe    Morbid (severe) obesity due to excess calories (Multi)     Morbid obesity    Nasal congestion     Head congestion    Other conditions influencing health status     Mass    Other specified disorders of breast     Acquired nipple deformity    Pain in left hip     Left hip pain    Pain in unspecified foot     Foot pain    Pain, unspecified     Tenderness    Personal history of malignant neoplasm of breast     History of breast cancer    Personal history of malignant neoplasm of breast     History of malignant neoplasm of breast    Personal history of other diseases of the circulatory system     History of hypertension    Personal history of other diseases of the digestive system     History of inflammatory bowel disease    Personal history of other diseases of the digestive system     History of ventral hernia    Personal history of other diseases of the musculoskeletal system and connective tissue     History of low back pain    Personal history of other diseases of the musculoskeletal system and connective  tissue     History of radicular syndrome of lower extremity    Personal history of other diseases of the musculoskeletal system and connective tissue     History of osteoporosis    Personal history of other diseases of the nervous system and sense organs     History of carpal tunnel syndrome    Personal history of other diseases of the nervous system and sense organs     History of sciatica    Personal history of other diseases of the respiratory system     History of sinusitis    Personal history of other endocrine, nutritional and metabolic disease     History of vitamin D deficiency    Personal history of other specified conditions     History of diarrhea    Personal history of other specified conditions     History of motion sickness    Radial head fracture 05/30/2023    Scapholunate dissociation of left wrist 05/30/2023    Strain of muscle, fascia and tendon of lower back, initial encounter     Lumbar strain    Strain of unspecified muscle, fascia and tendon at shoulder and upper arm level, right arm, initial encounter     Right shoulder strain    Unspecified abdominal hernia without obstruction or gangrene     Hernia    Unspecified sprain of left wrist, initial encounter     Sprain of left wrist    Vitamin B12 deficiency anemia, unspecified     Anemia, B12 deficiency        PAST SURGICAL HISTORY:  Past Surgical History:   Procedure Laterality Date    BELT ABDOMINOPLASTY  11/04/2014    Abdominoplasty    CHOLECYSTECTOMY      CT GUIDED PERCUTANEOUS BIOPSY LUNG  01/06/2020    CT GUIDED PERCUTANEOUS BIOPSY LUNG 1/6/2020 [facility]    GASTRIC BYPASS  11/04/2014    Gastric Surgery For Morbid Obesity Gastric Bypass    HERNIA REPAIR  11/04/2014    Hernia Repair    HYSTERECTOMY      LYMPH NODE BIOPSY      MASTECTOMY  11/04/2014    Breast Surgery Mastectomy    OTHER SURGICAL HISTORY  11/04/2014    Breast Reconstruction With Implant Prosthesis Bilateral    OTHER SURGICAL HISTORY  11/04/2014    Breast Reconstruction  With Tissue Expander Bilateral    OTHER SURGICAL HISTORY  12/05/2022    Cardiac catheterization    OTHER SURGICAL HISTORY  03/19/2019    Eye surgery    OTHER SURGICAL HISTORY  03/18/2019    Ear pressure equalization tube insertion    OTHER SURGICAL HISTORY  03/18/2019    Yris-en-Y gastric bypass    OTHER SURGICAL HISTORY  03/18/2019    Hand surgery    OTHER SURGICAL HISTORY  03/18/2019    Hysterectomy    OTHER SURGICAL HISTORY  04/15/2015    Breast Surgery Revision Of Reconstructed Left Breast    SINUS SURGERY      TONSILLECTOMY      TUBAL LIGATION  11/04/2014    Tubal Ligation       FAMILY HISTORY:  Family History   Problem Relation Name Age of Onset    Prostate cancer Father      Heart attack Maternal Grandmother      Stroke Maternal Grandfather Frandy     Cancer Paternal Grandfather Steven         SOCIAL HISTORY:  Social History     Tobacco Use    Smoking status: Former     Types: Cigarettes    Smokeless tobacco: Never   Vaping Use    Vaping status: Never Used   Substance Use Topics    Alcohol use: Not Currently     Comment: social    Drug use: Never       MEDICATIONS:  Prior to Admission Medications:    Current Outpatient Medications:     cyanocobalamin (Vitamin B-12) 1,000 mcg/mL injection, INJECT 1 ML INTO THE SHOULDER, THIGH, OR BUTTOCKS EVERY 30 DAYS, Disp: 10 mL, Rfl: 0    fexofenadine (Allegra) 180 mg tablet, Take 1 tablet (180 mg) by mouth once daily., Disp: , Rfl:     levothyroxine (Synthroid, Levoxyl) 50 mcg tablet, Take 1 tablet (50 mcg) by mouth once daily., Disp: 90 tablet, Rfl: 3    omeprazole (PriLOSEC) 40 mg DR capsule, Take 1 capsule (40 mg) by mouth once daily. Open Capsule, sprinkle over sugar free applesauce or pudding - Do not crush or chew., Disp: 30 capsule, Rfl: 2    triamterene-hydrochlorothiazid (Dyazide) 37.5-25 mg capsule, Take 1 capsule by mouth once daily in the morning., Disp: , Rfl:   No current facility-administered medications for this visit.    ALLERGIES:  Allergies   Allergen  "Reactions    Prednisone Shortness of breath    Promethazine Hives    Codeine Diarrhea and Other     Stomach Burning    Hydrocodone-Acetaminophen Other     \"Stomach burns\"    Ibuprofen Other     Cannot take due to gastric bypass    Moxifloxacin Hives and Unknown     Pt states it caused cell disruption    Nsaids (Non-Steroidal Anti-Inflammatory Drug) Other     Cannot take due to gastric bypass      Vancomycin Rash     the patient reports that she had \"red man syndrome\" when taking Vancomycin       REVIEW OF SYSTEMS:  GENERAL: Negative for malaise, significant weight loss and fever  NECK: Negative for lumps, goiter, pain and significant neck swelling  RESPIRATORY: Negative for cough, wheezing or shortness of breath.  CARDIOVASCULAR: Negative for chest pain, leg swelling or palpitations.  GI: Continues to have upper abdominal pain along with nausea and vomiting, last episode was Tuesday and was constipated as well but it is intermittent.  : No history of dysuria, frequency or incontinence  MUSCULOSKELETAL: Negative for joint pain or swelling, back pain or muscle pain.  SKIN: Negative for lesions, rash, and itching.  PSYCH: Negative for sleep disturbance, mood disorder and recent psychosocial stressors.  ENDOCRINE: Negative for cold or heat intolerance, polyuria, polydipsia and goiter.    PHYSICAL EXAM:    IMPRESSION:  Malathi Castellanos is a 59 y.o. female with a remote history of RNY GBP approximately 20 years ago with complaints of dull LUQ pain for about the last year or so. Patient presenting today as a follow up to review results of repeat CT abdomen & pelvis with p.o. and IV contrast to rule out persistent intussusception.    Discussed the CT findings with the patient.    She still has a small segment intussusception at the jejunojejunostomy and continues to have abdominal pain and p.o. intolerance issues although they are intermittent.  Most likely she is having intermittent partial obstruction episodes resulting in " the symptoms.    I have offered her to come to emergency room today or tomorrow versus trying to get it on elective basis.  She thinks she is not feeling bad enough to come to emergency room however agrees to proceed with diagnostic laparoscopy and revision of her jejunojejunostomy.  Explained the risk of surgery including bleeding, infection, leak, chronic symptoms, persistent symptoms, recurrent intussusception, protein calorie malnutrition, vitamin deficiency, internal hernia, cardiopulmonary complications etc. and she voiced understanding and agreement to proceed with proposed diagnostic laparoscopy and revision of jejunojejunostomy.  Also discussed the possibility of lengthening of her biliopancreatic limb because we have to revise the jejunojejunostomy anyway and she expressed a keen interest in that as well and potential risk of more malabsorption and requirement of more vitamins in future discussed with the patient she voiced understanding and agreement.  Advised her to stop her Adipex for now.  Will plan for diagnostic laparoscopy with revision of jejunojejunostomy and related procedures next Wednesday or as scheduling allows.  Also advised the patient to come to emergency room if symptoms get worse.  She voiced understanding and agreement.    Leatha Hamilton MD  Bariatric and Minimally Invasive General Surgery

## 2024-11-11 ENCOUNTER — PHARMACY VISIT (OUTPATIENT)
Dept: PHARMACY | Facility: CLINIC | Age: 59
End: 2024-11-11
Payer: COMMERCIAL

## 2024-11-12 ENCOUNTER — APPOINTMENT (OUTPATIENT)
Dept: PRIMARY CARE | Facility: CLINIC | Age: 59
End: 2024-11-12
Payer: COMMERCIAL

## 2024-11-12 VITALS
SYSTOLIC BLOOD PRESSURE: 114 MMHG | RESPIRATION RATE: 16 BRPM | HEIGHT: 67 IN | WEIGHT: 182 LBS | DIASTOLIC BLOOD PRESSURE: 76 MMHG | HEART RATE: 87 BPM | TEMPERATURE: 97.2 F | OXYGEN SATURATION: 100 % | BODY MASS INDEX: 28.56 KG/M2

## 2024-11-12 DIAGNOSIS — Z98.84 S/P GASTRIC BYPASS: ICD-10-CM

## 2024-11-12 DIAGNOSIS — E66.3 OVERWEIGHT WITH BODY MASS INDEX (BMI) OF 28 TO 28.9 IN ADULT: Primary | ICD-10-CM

## 2024-11-12 DIAGNOSIS — E03.9 ACQUIRED HYPOTHYROIDISM: ICD-10-CM

## 2024-11-12 PROCEDURE — 3008F BODY MASS INDEX DOCD: CPT | Performed by: FAMILY MEDICINE

## 2024-11-12 PROCEDURE — 99213 OFFICE O/P EST LOW 20 MIN: CPT | Performed by: FAMILY MEDICINE

## 2024-11-12 PROCEDURE — 1036F TOBACCO NON-USER: CPT | Performed by: FAMILY MEDICINE

## 2024-11-12 NOTE — PROGRESS NOTES
"Subjective   Patient ID: Malathi Castellanos is a 59 y.o. female who presents for Follow-up (hypothyroidism). I last saw the patient 10/14/2024    HPI     Patient is here for a F/U    Past medical, surgical, and family history reviewed.  Reviewed and documented all medications   Pt eating well, exercising as tolerated and taking medications as directed    Patient has lost few pounds and is tolerating the medication well. Patient would like to have a refill of her Ozempic.    Patient is following a mediterranean diet with high protein.    Review of Systems  Except positives as noted in the CC & HPI      Constitutional: Denies fevers, chills, night sweats, fatigue, weight changes, change in appetite    Eyes: Denies blurry vision, double vision    ENT: Denies otalgia, trouble hearing, tinnitus, vertigo, nasal congestion, rhinorrhea, sore throat    Neck: Denies swelling, masses    Cardiovascular: Denies chest pain, palpitations, edema, orthopnea, syncope    Respiratory: Denies dyspnea, cough, wheezing, postural nocturnal dyspnea    Gastrointestinal: Denies abdominal pain, nausea, vomiting, diarrhea, constipation, melena, hematochezia    Genitourinary: Denies dysuria, hematuria, frequency, urgency    Musculoskeletal: Denies back pain, arthralgias, myalgias    Integumentary: Denies skin lesions, rashes, masses    Neurological: Denies dizziness, headaches, confusion, limb weakness, paresthesias, syncope, convulsions    Psychiatric: Denies depression, anxiety, homicidal ideations, suicidal ideations, sleep disturbances    Endocrine: Denies polyphagia, polydipsia, polyuria, weakness, hair thinning, heat intolerance, cold intolerance, weight changes    Heme/Lymph: Denies easy bruising, easy bleeding, swollen glands    Objective   /76 (BP Location: Right arm, Patient Position: Sitting, BP Cuff Size: Adult long)   Pulse 87   Temp 36.2 °C (97.2 °F)   Resp 16   Ht 1.702 m (5' 7\")   Wt 82.6 kg (182 lb)   SpO2 100%   BMI " 28.51 kg/m²     Physical Exam  114/76 on recheck of BP in the right arm.     Gen. Appearance - well-developed, well-nourished, 59 y.o., White female in no acute distress.     Skin - warm, pink and dry without rash or concerning lesions.     Mental Status - alert and oriented times 3. Normal mood and affect appropriate to mood.     Neck - supple without lymphadenopathy. Carotid pulses are normal without bruits. Thyroid is normal in midline without nodules.     Chest - lungs are clear to auscultation without rales, rhonchi or wheezes.    Heart - regular, rate, and rhythm without murmurs, rubs or gallops.     Extremities - no cyanosis, clubbing or edema. Pedal pulses are 2+ normal at the dorsalis pedis and posterior tibial pulses bilaterally.     Neurological - cranial nerves II through XII are grossly intact. Motor strength 5/5 at all fours.     Assessment/Plan   1. Overweight with body mass index (BMI) of 28 to 28.9 in adult        2. S/P gastric bypass        3. Acquired hypothyroidism            Patient to continue current medications (with any exceptions as noted) and diet. Follow-up in 1 month(s) otherwise as needed.      Increased Ozempic from 0.25 mg to 0.5 mg    Patient was given samples of Ozempic 0.5 mg subcutaneous weekly.    Patient's goal weight is 170 pounds.    Scribe Attestation  By signing my name below, Suzy SCOTT Scribe   attest that this documentation has been prepared under the direction and in the presence of Allan Tubbs MD.

## 2024-11-13 DIAGNOSIS — H81.03 MENIERE'S DISEASE OF BOTH EARS: Primary | ICD-10-CM

## 2024-11-13 RX ORDER — TRIAMTERENE/HYDROCHLOROTHIAZID 37.5-25 MG
1 TABLET ORAL DAILY
Qty: 90 TABLET | Refills: 3 | Status: SHIPPED | OUTPATIENT
Start: 2024-11-13

## 2024-11-14 ENCOUNTER — TELEMEDICINE (OUTPATIENT)
Dept: SURGERY | Facility: CLINIC | Age: 59
End: 2024-11-14
Payer: COMMERCIAL

## 2024-11-14 DIAGNOSIS — Z98.84 S/P GASTRIC BYPASS: ICD-10-CM

## 2024-11-14 DIAGNOSIS — Z01.818 PREOPERATIVE CLEARANCE: ICD-10-CM

## 2024-11-14 DIAGNOSIS — Z98.84 BARIATRIC SURGERY STATUS: Primary | ICD-10-CM

## 2024-11-14 DIAGNOSIS — R11.2 NAUSEA AND VOMITING, UNSPECIFIED VOMITING TYPE: ICD-10-CM

## 2024-11-14 DIAGNOSIS — K56.1 INTUSSUSCEPTION INTESTINE (MULTI): Primary | ICD-10-CM

## 2024-11-14 PROCEDURE — 99213 OFFICE O/P EST LOW 20 MIN: CPT | Performed by: SURGERY

## 2024-11-14 PROCEDURE — RXMED WILLOW AMBULATORY MEDICATION CHARGE

## 2024-11-14 PROCEDURE — 99213 OFFICE O/P EST LOW 20 MIN: CPT | Mod: 95 | Performed by: SURGERY

## 2024-11-15 ENCOUNTER — HOSPITAL ENCOUNTER (OUTPATIENT)
Dept: RADIOLOGY | Facility: HOSPITAL | Age: 59
Discharge: HOME | End: 2024-11-15
Payer: COMMERCIAL

## 2024-11-15 ENCOUNTER — PHARMACY VISIT (OUTPATIENT)
Dept: PHARMACY | Facility: CLINIC | Age: 59
End: 2024-11-15
Payer: COMMERCIAL

## 2024-11-15 ENCOUNTER — LAB (OUTPATIENT)
Dept: LAB | Facility: LAB | Age: 59
End: 2024-11-15
Payer: COMMERCIAL

## 2024-11-15 DIAGNOSIS — Z98.84 BARIATRIC SURGERY STATUS: ICD-10-CM

## 2024-11-15 DIAGNOSIS — Z01.818 PREOPERATIVE CLEARANCE: ICD-10-CM

## 2024-11-15 DIAGNOSIS — Z98.84 S/P GASTRIC BYPASS: ICD-10-CM

## 2024-11-15 DIAGNOSIS — R10.84 GENERALIZED ABDOMINAL PAIN: ICD-10-CM

## 2024-11-15 DIAGNOSIS — E66.3 OVERWEIGHT WITH BODY MASS INDEX (BMI) OF 27 TO 27.9 IN ADULT: ICD-10-CM

## 2024-11-15 LAB
25(OH)D3 SERPL-MCNC: 36 NG/ML (ref 30–100)
ABO GROUP (TYPE) IN BLOOD: NORMAL
ALBUMIN SERPL BCP-MCNC: 4.3 G/DL (ref 3.4–5)
ALP SERPL-CCNC: 97 U/L (ref 33–110)
ALT SERPL W P-5'-P-CCNC: 14 U/L (ref 7–45)
ANION GAP SERPL CALC-SCNC: 12 MMOL/L (ref 10–20)
ANTIBODY SCREEN: NORMAL
APPEARANCE UR: CLEAR
AST SERPL W P-5'-P-CCNC: 23 U/L (ref 9–39)
BASOPHILS # BLD AUTO: 0.05 X10*3/UL (ref 0–0.1)
BASOPHILS NFR BLD AUTO: 0.9 %
BILIRUB SERPL-MCNC: 0.9 MG/DL (ref 0–1.2)
BILIRUB UR STRIP.AUTO-MCNC: NEGATIVE MG/DL
BUN SERPL-MCNC: 14 MG/DL (ref 6–23)
CALCIUM SERPL-MCNC: 9.7 MG/DL (ref 8.6–10.3)
CHLORIDE SERPL-SCNC: 103 MMOL/L (ref 98–107)
CO2 SERPL-SCNC: 29 MMOL/L (ref 21–32)
COLOR UR: NORMAL
CREAT SERPL-MCNC: 0.64 MG/DL (ref 0.5–1.05)
EGFRCR SERPLBLD CKD-EPI 2021: >90 ML/MIN/1.73M*2
EOSINOPHIL # BLD AUTO: 0.08 X10*3/UL (ref 0–0.7)
EOSINOPHIL NFR BLD AUTO: 1.4 %
ERYTHROCYTE [DISTWIDTH] IN BLOOD BY AUTOMATED COUNT: 16 % (ref 11.5–14.5)
EST. AVERAGE GLUCOSE BLD GHB EST-MCNC: 120 MG/DL
FERRITIN SERPL-MCNC: 11 NG/ML (ref 8–150)
FOLATE SERPL-MCNC: >22.3 NG/ML
GLUCOSE SERPL-MCNC: 74 MG/DL (ref 74–99)
GLUCOSE UR STRIP.AUTO-MCNC: NORMAL MG/DL
HBA1C MFR BLD: 5.8 %
HCT VFR BLD AUTO: 36.1 % (ref 36–46)
HGB BLD-MCNC: 10.8 G/DL (ref 12–16)
IMM GRANULOCYTES # BLD AUTO: 0.02 X10*3/UL (ref 0–0.7)
IMM GRANULOCYTES NFR BLD AUTO: 0.4 % (ref 0–0.9)
INR PPP: 1 (ref 0.9–1.1)
IRON SATN MFR SERPL: 5 % (ref 25–45)
IRON SERPL-MCNC: 21 UG/DL (ref 35–150)
KETONES UR STRIP.AUTO-MCNC: NEGATIVE MG/DL
LEUKOCYTE ESTERASE UR QL STRIP.AUTO: NEGATIVE
LIPASE SERPL-CCNC: 67 U/L (ref 9–82)
LYMPHOCYTES # BLD AUTO: 1.21 X10*3/UL (ref 1.2–4.8)
LYMPHOCYTES NFR BLD AUTO: 21.7 %
MCH RBC QN AUTO: 24.1 PG (ref 26–34)
MCHC RBC AUTO-ENTMCNC: 29.9 G/DL (ref 32–36)
MCV RBC AUTO: 80 FL (ref 80–100)
MONOCYTES # BLD AUTO: 0.56 X10*3/UL (ref 0.1–1)
MONOCYTES NFR BLD AUTO: 10.1 %
NEUTROPHILS # BLD AUTO: 3.65 X10*3/UL (ref 1.2–7.7)
NEUTROPHILS NFR BLD AUTO: 65.5 %
NITRITE UR QL STRIP.AUTO: NEGATIVE
NRBC BLD-RTO: 0 /100 WBCS (ref 0–0)
PH UR STRIP.AUTO: 6.5 [PH]
PLATELET # BLD AUTO: 422 X10*3/UL (ref 150–450)
POTASSIUM SERPL-SCNC: 3.6 MMOL/L (ref 3.5–5.3)
PROT SERPL-MCNC: 7.1 G/DL (ref 6.4–8.2)
PROT UR STRIP.AUTO-MCNC: NEGATIVE MG/DL
PROTHROMBIN TIME: 11.8 SECONDS (ref 9.8–12.8)
RBC # BLD AUTO: 4.49 X10*6/UL (ref 4–5.2)
RBC # UR STRIP.AUTO: NEGATIVE /UL
RH FACTOR (ANTIGEN D): NORMAL
SODIUM SERPL-SCNC: 140 MMOL/L (ref 136–145)
SP GR UR STRIP.AUTO: 1.02
TIBC SERPL-MCNC: 462 UG/DL (ref 240–445)
UIBC SERPL-MCNC: 441 UG/DL (ref 110–370)
UROBILINOGEN UR STRIP.AUTO-MCNC: NORMAL MG/DL
WBC # BLD AUTO: 5.6 X10*3/UL (ref 4.4–11.3)

## 2024-11-15 PROCEDURE — 71045 X-RAY EXAM CHEST 1 VIEW: CPT

## 2024-11-15 PROCEDURE — 83970 ASSAY OF PARATHORMONE: CPT

## 2024-11-15 PROCEDURE — 82525 ASSAY OF COPPER: CPT

## 2024-11-15 PROCEDURE — 82306 VITAMIN D 25 HYDROXY: CPT

## 2024-11-15 PROCEDURE — 83540 ASSAY OF IRON: CPT

## 2024-11-15 PROCEDURE — 82728 ASSAY OF FERRITIN: CPT

## 2024-11-15 PROCEDURE — 84630 ASSAY OF ZINC: CPT

## 2024-11-15 PROCEDURE — 84425 ASSAY OF VITAMIN B-1: CPT

## 2024-11-15 PROCEDURE — 83690 ASSAY OF LIPASE: CPT

## 2024-11-15 PROCEDURE — 36415 COLL VENOUS BLD VENIPUNCTURE: CPT

## 2024-11-15 PROCEDURE — 80053 COMPREHEN METABOLIC PANEL: CPT

## 2024-11-15 PROCEDURE — 82746 ASSAY OF FOLIC ACID SERUM: CPT

## 2024-11-15 PROCEDURE — 86901 BLOOD TYPING SEROLOGIC RH(D): CPT

## 2024-11-15 PROCEDURE — 85610 PROTHROMBIN TIME: CPT

## 2024-11-15 PROCEDURE — 81003 URINALYSIS AUTO W/O SCOPE: CPT

## 2024-11-15 PROCEDURE — 83550 IRON BINDING TEST: CPT

## 2024-11-15 PROCEDURE — 85025 COMPLETE CBC W/AUTO DIFF WBC: CPT

## 2024-11-15 PROCEDURE — 86900 BLOOD TYPING SEROLOGIC ABO: CPT

## 2024-11-15 PROCEDURE — 80323 ALKALOIDS NOS: CPT

## 2024-11-15 PROCEDURE — 83036 HEMOGLOBIN GLYCOSYLATED A1C: CPT

## 2024-11-15 PROCEDURE — 86850 RBC ANTIBODY SCREEN: CPT

## 2024-11-15 RX ORDER — SEMAGLUTIDE 1.34 MG/ML
0.5 INJECTION, SOLUTION SUBCUTANEOUS
COMMUNITY
End: 2024-11-22 | Stop reason: HOSPADM

## 2024-11-16 LAB
HOLD SPECIMEN: NORMAL
PTH-INTACT SERPL-MCNC: 33.9 PG/ML (ref 18.5–88)

## 2024-11-18 LAB
COPPER SERPL-MCNC: 121.3 UG/DL (ref 80–155)
ZINC SERPL-MCNC: 56.2 UG/DL (ref 60–120)

## 2024-11-19 RX ORDER — ASPIRIN 325 MG
1 TABLET ORAL 2 TIMES DAILY
COMMUNITY

## 2024-11-19 NOTE — PREPROCEDURE INSTRUCTIONS
Current Medications   Medication Instructions    calcium citrate/vitamin D3 (CITRACAL REGULAR ORAL) Hold day of surgery    cyanocobalamin (Vitamin B-12) 1,000 mcg/mL injection Hold day of surgery    fexofenadine (Allegra) 180 mg tablet Take morning of surgery with sip of water    levothyroxine (Synthroid, Levoxyl) 50 mcg tablet Take morning of surgery with sip of water    omeprazole (PriLOSEC) 40 mg DR capsule Take morning of surgery with sip of water    pediatric multivitamin (Children's Multivitamin) tablet,chewable chewable tablet Hold day of surgery    triamterene-hydrochlorothiazid (Maxzide-25) 37.5-25 mg tablet Take morning of surgery with sip of water            NPO Instructions:    Do not eat any food after midnight the night before your surgery.  You may have 13 ounces of clear liquids until TWO hours before surgery. This includes water, black tea/coffee, (no milk or cream) apple juice and electrolyte drinks (Gatorade).    Additional Instructions:     Day of Surgery: Arrive at 1:30 PM for 3:00 PM surgery    Enter through the main entrance of [facility], located at [address] [facility] [address]. Proceed to registration, located in the back right hand corner. You will need your ID and insurance card for registration. Please ensure you have a responsible adult to drive you home.     Take a shower the morning of or night before your procedure. After you shower avoid lotions, powders, deodorants or anything applied to the skin. If you wear contacts or glasses, wear the glasses. If you do not have glasses, please bring a case for your contacts. You may wear hearing aids and dentures, bring a case for them or we will provide one. Make sure you wear something loose and comfortable. Keep in mind your surgery type and wear something that will accommodate incisions or bandages. Please remove all jewelry.     For further questions [facility] can be contacted at [phone] between  7AM-3PM.

## 2024-11-20 ENCOUNTER — HOSPITAL ENCOUNTER (INPATIENT)
Facility: HOSPITAL | Age: 59
LOS: 2 days | Discharge: HOME | DRG: 330 | End: 2024-11-22
Attending: SURGERY | Admitting: STUDENT IN AN ORGANIZED HEALTH CARE EDUCATION/TRAINING PROGRAM
Payer: COMMERCIAL

## 2024-11-20 ENCOUNTER — ANESTHESIA EVENT (OUTPATIENT)
Dept: OPERATING ROOM | Facility: HOSPITAL | Age: 59
End: 2024-11-20
Payer: COMMERCIAL

## 2024-11-20 ENCOUNTER — ANESTHESIA (OUTPATIENT)
Dept: OPERATING ROOM | Facility: HOSPITAL | Age: 59
End: 2024-11-20
Payer: COMMERCIAL

## 2024-11-20 DIAGNOSIS — R10.9 ABDOMINAL PAIN WITH VOMITING AND HISTORY OF ABDOMINAL SURGERY: Primary | ICD-10-CM

## 2024-11-20 DIAGNOSIS — R11.10 ABDOMINAL PAIN WITH VOMITING AND HISTORY OF ABDOMINAL SURGERY: Primary | ICD-10-CM

## 2024-11-20 DIAGNOSIS — Z98.84 BARIATRIC SURGERY STATUS: ICD-10-CM

## 2024-11-20 DIAGNOSIS — Z98.890 ABDOMINAL PAIN WITH VOMITING AND HISTORY OF ABDOMINAL SURGERY: Primary | ICD-10-CM

## 2024-11-20 PROBLEM — R11.2 PONV (POSTOPERATIVE NAUSEA AND VOMITING): Status: ACTIVE | Noted: 2024-11-20

## 2024-11-20 LAB
ABO GROUP (TYPE) IN BLOOD: NORMAL
ABO GROUP (TYPE) IN BLOOD: NORMAL
ANABASINE UR-MCNC: <5 NG/ML
ANTIBODY SCREEN: NORMAL
COTININE UR-MCNC: <15 NG/ML
ERYTHROCYTE [DISTWIDTH] IN BLOOD BY AUTOMATED COUNT: 15.9 % (ref 11.5–14.5)
HCT VFR BLD AUTO: 28.8 % (ref 36–46)
HGB BLD-MCNC: 9 G/DL (ref 12–16)
MCH RBC QN AUTO: 24.5 PG (ref 26–34)
MCHC RBC AUTO-ENTMCNC: 31.3 G/DL (ref 32–36)
MCV RBC AUTO: 79 FL (ref 80–100)
NICOTINE UR-MCNC: <15 NG/ML
NRBC BLD-RTO: 0 /100 WBCS (ref 0–0)
PLATELET # BLD AUTO: 350 X10*3/UL (ref 150–450)
RBC # BLD AUTO: 3.67 X10*6/UL (ref 4–5.2)
RH FACTOR (ANTIGEN D): NORMAL
RH FACTOR (ANTIGEN D): NORMAL
TRANS-3-OH-COTININE UR-MCNC: <50 NG/ML
VIT B1 PYROPHOSHATE BLD-SCNC: 56 NMOL/L (ref 70–180)
WBC # BLD AUTO: 13.4 X10*3/UL (ref 4.4–11.3)

## 2024-11-20 PROCEDURE — 3700000001 HC GENERAL ANESTHESIA TIME - INITIAL BASE CHARGE: Performed by: SURGERY

## 2024-11-20 PROCEDURE — 3700000002 HC GENERAL ANESTHESIA TIME - EACH INCREMENTAL 1 MINUTE: Performed by: SURGERY

## 2024-11-20 PROCEDURE — 2500000004 HC RX 250 GENERAL PHARMACY W/ HCPCS (ALT 636 FOR OP/ED)

## 2024-11-20 PROCEDURE — 96372 THER/PROPH/DIAG INJ SC/IM: CPT | Performed by: STUDENT IN AN ORGANIZED HEALTH CARE EDUCATION/TRAINING PROGRAM

## 2024-11-20 PROCEDURE — 2500000005 HC RX 250 GENERAL PHARMACY W/O HCPCS

## 2024-11-20 PROCEDURE — 36415 COLL VENOUS BLD VENIPUNCTURE: CPT | Performed by: STUDENT IN AN ORGANIZED HEALTH CARE EDUCATION/TRAINING PROGRAM

## 2024-11-20 PROCEDURE — 2500000004 HC RX 250 GENERAL PHARMACY W/ HCPCS (ALT 636 FOR OP/ED): Mod: JZ | Performed by: ANESTHESIOLOGY

## 2024-11-20 PROCEDURE — 3600000008 HC OR TIME - EACH INCREMENTAL 1 MINUTE - PROCEDURE LEVEL THREE: Performed by: SURGERY

## 2024-11-20 PROCEDURE — 2500000001 HC RX 250 WO HCPCS SELF ADMINISTERED DRUGS (ALT 637 FOR MEDICARE OP): Performed by: STUDENT IN AN ORGANIZED HEALTH CARE EDUCATION/TRAINING PROGRAM

## 2024-11-20 PROCEDURE — P9045 ALBUMIN (HUMAN), 5%, 250 ML: HCPCS | Mod: JZ | Performed by: ANESTHESIOLOGY

## 2024-11-20 PROCEDURE — 2500000004 HC RX 250 GENERAL PHARMACY W/ HCPCS (ALT 636 FOR OP/ED): Performed by: SURGERY

## 2024-11-20 PROCEDURE — 93010 ELECTROCARDIOGRAM REPORT: CPT | Performed by: STUDENT IN AN ORGANIZED HEALTH CARE EDUCATION/TRAINING PROGRAM

## 2024-11-20 PROCEDURE — 2500000004 HC RX 250 GENERAL PHARMACY W/ HCPCS (ALT 636 FOR OP/ED): Performed by: STUDENT IN AN ORGANIZED HEALTH CARE EDUCATION/TRAINING PROGRAM

## 2024-11-20 PROCEDURE — 0DB84ZZ EXCISION OF SMALL INTESTINE, PERCUTANEOUS ENDOSCOPIC APPROACH: ICD-10-PCS | Performed by: SURGERY

## 2024-11-20 PROCEDURE — 2500000004 HC RX 250 GENERAL PHARMACY W/ HCPCS (ALT 636 FOR OP/ED): Performed by: ANESTHESIOLOGY

## 2024-11-20 PROCEDURE — 85027 COMPLETE CBC AUTOMATED: CPT | Performed by: STUDENT IN AN ORGANIZED HEALTH CARE EDUCATION/TRAINING PROGRAM

## 2024-11-20 PROCEDURE — 7100000001 HC RECOVERY ROOM TIME - INITIAL BASE CHARGE: Performed by: SURGERY

## 2024-11-20 PROCEDURE — 44202 LAP ENTERECTOMY: CPT | Performed by: STUDENT IN AN ORGANIZED HEALTH CARE EDUCATION/TRAINING PROGRAM

## 2024-11-20 PROCEDURE — 86901 BLOOD TYPING SEROLOGIC RH(D): CPT | Performed by: STUDENT IN AN ORGANIZED HEALTH CARE EDUCATION/TRAINING PROGRAM

## 2024-11-20 PROCEDURE — 88307 TISSUE EXAM BY PATHOLOGIST: CPT | Mod: TC,PARLAB,WESLAB | Performed by: SURGERY

## 2024-11-20 PROCEDURE — 44202 LAP ENTERECTOMY: CPT | Performed by: SURGERY

## 2024-11-20 PROCEDURE — 0D1A4ZA BYPASS JEJUNUM TO JEJUNUM, PERCUTANEOUS ENDOSCOPIC APPROACH: ICD-10-PCS | Performed by: SURGERY

## 2024-11-20 PROCEDURE — 36415 COLL VENOUS BLD VENIPUNCTURE: CPT | Performed by: SURGERY

## 2024-11-20 PROCEDURE — 2720000007 HC OR 272 NO HCPCS: Performed by: SURGERY

## 2024-11-20 PROCEDURE — 2500000002 HC RX 250 W HCPCS SELF ADMINISTERED DRUGS (ALT 637 FOR MEDICARE OP, ALT 636 FOR OP/ED): Performed by: STUDENT IN AN ORGANIZED HEALTH CARE EDUCATION/TRAINING PROGRAM

## 2024-11-20 PROCEDURE — 7100000002 HC RECOVERY ROOM TIME - EACH INCREMENTAL 1 MINUTE: Performed by: SURGERY

## 2024-11-20 PROCEDURE — 3600000003 HC OR TIME - INITIAL BASE CHARGE - PROCEDURE LEVEL THREE: Performed by: SURGERY

## 2024-11-20 PROCEDURE — 1100000001 HC PRIVATE ROOM DAILY

## 2024-11-20 PROCEDURE — 88307 TISSUE EXAM BY PATHOLOGIST: CPT | Performed by: STUDENT IN AN ORGANIZED HEALTH CARE EDUCATION/TRAINING PROGRAM

## 2024-11-20 RX ORDER — PANTOPRAZOLE SODIUM 40 MG/1
40 TABLET, DELAYED RELEASE ORAL
Status: DISCONTINUED | OUTPATIENT
Start: 2024-11-21 | End: 2024-11-22 | Stop reason: HOSPADM

## 2024-11-20 RX ORDER — MEPERIDINE HYDROCHLORIDE 25 MG/ML
12.5 INJECTION INTRAMUSCULAR; INTRAVENOUS; SUBCUTANEOUS EVERY 10 MIN PRN
Status: DISCONTINUED | OUTPATIENT
Start: 2024-11-20 | End: 2024-11-20 | Stop reason: HOSPADM

## 2024-11-20 RX ORDER — SODIUM CHLORIDE, SODIUM LACTATE, POTASSIUM CHLORIDE, CALCIUM CHLORIDE 600; 310; 30; 20 MG/100ML; MG/100ML; MG/100ML; MG/100ML
100 INJECTION, SOLUTION INTRAVENOUS CONTINUOUS
Status: ACTIVE | OUTPATIENT
Start: 2024-11-20 | End: 2024-11-20

## 2024-11-20 RX ORDER — NORETHINDRONE AND ETHINYL ESTRADIOL 0.5-0.035
KIT ORAL AS NEEDED
Status: DISCONTINUED | OUTPATIENT
Start: 2024-11-20 | End: 2024-11-20

## 2024-11-20 RX ORDER — BUPIVACAINE HYDROCHLORIDE 2.5 MG/ML
INJECTION, SOLUTION INFILTRATION; PERINEURAL AS NEEDED
Status: DISCONTINUED | OUTPATIENT
Start: 2024-11-20 | End: 2024-11-20 | Stop reason: HOSPADM

## 2024-11-20 RX ORDER — APREPITANT 40 MG/1
40 CAPSULE ORAL ONCE
Status: COMPLETED | OUTPATIENT
Start: 2024-11-20 | End: 2024-11-20

## 2024-11-20 RX ORDER — ONDANSETRON 4 MG/1
4 TABLET, ORALLY DISINTEGRATING ORAL EVERY 8 HOURS PRN
Status: DISCONTINUED | OUTPATIENT
Start: 2024-11-20 | End: 2024-11-22 | Stop reason: HOSPADM

## 2024-11-20 RX ORDER — ACETAMINOPHEN 10 MG/ML
1000 INJECTION, SOLUTION INTRAVENOUS EVERY 6 HOURS
Status: DISCONTINUED | OUTPATIENT
Start: 2024-11-20 | End: 2024-11-22

## 2024-11-20 RX ORDER — HYDROMORPHONE HYDROCHLORIDE 1 MG/ML
0.5 INJECTION, SOLUTION INTRAMUSCULAR; INTRAVENOUS; SUBCUTANEOUS EVERY 4 HOURS PRN
Status: DISCONTINUED | OUTPATIENT
Start: 2024-11-20 | End: 2024-11-20

## 2024-11-20 RX ORDER — CEFAZOLIN SODIUM 2 G/100ML
2 INJECTION, SOLUTION INTRAVENOUS ONCE
Status: COMPLETED | OUTPATIENT
Start: 2024-11-20 | End: 2024-11-20

## 2024-11-20 RX ORDER — LIDOCAINE HCL/PF 100 MG/5ML
SYRINGE (ML) INTRAVENOUS AS NEEDED
Status: DISCONTINUED | OUTPATIENT
Start: 2024-11-20 | End: 2024-11-20

## 2024-11-20 RX ORDER — METOCLOPRAMIDE HYDROCHLORIDE 5 MG/ML
10 INJECTION INTRAMUSCULAR; INTRAVENOUS EVERY 6 HOURS PRN
Status: DISCONTINUED | OUTPATIENT
Start: 2024-11-20 | End: 2024-11-22 | Stop reason: HOSPADM

## 2024-11-20 RX ORDER — HEPARIN SODIUM 5000 [USP'U]/ML
5000 INJECTION, SOLUTION INTRAVENOUS; SUBCUTANEOUS EVERY 8 HOURS SCHEDULED
Status: DISCONTINUED | OUTPATIENT
Start: 2024-11-21 | End: 2024-11-22 | Stop reason: HOSPADM

## 2024-11-20 RX ORDER — ONDANSETRON HYDROCHLORIDE 2 MG/ML
INJECTION, SOLUTION INTRAVENOUS AS NEEDED
Status: DISCONTINUED | OUTPATIENT
Start: 2024-11-20 | End: 2024-11-20

## 2024-11-20 RX ORDER — SODIUM CHLORIDE, SODIUM LACTATE, POTASSIUM CHLORIDE, CALCIUM CHLORIDE 600; 310; 30; 20 MG/100ML; MG/100ML; MG/100ML; MG/100ML
20 INJECTION, SOLUTION INTRAVENOUS CONTINUOUS
Status: ACTIVE | OUTPATIENT
Start: 2024-11-20 | End: 2024-11-21

## 2024-11-20 RX ORDER — FENTANYL CITRATE 50 UG/ML
INJECTION, SOLUTION INTRAMUSCULAR; INTRAVENOUS AS NEEDED
Status: DISCONTINUED | OUTPATIENT
Start: 2024-11-20 | End: 2024-11-20

## 2024-11-20 RX ORDER — OXYCODONE HYDROCHLORIDE 5 MG/1
10 TABLET ORAL EVERY 4 HOURS PRN
Status: DISCONTINUED | OUTPATIENT
Start: 2024-11-20 | End: 2024-11-21

## 2024-11-20 RX ORDER — SODIUM CHLORIDE, SODIUM LACTATE, POTASSIUM CHLORIDE, CALCIUM CHLORIDE 600; 310; 30; 20 MG/100ML; MG/100ML; MG/100ML; MG/100ML
100 INJECTION, SOLUTION INTRAVENOUS CONTINUOUS
Status: ACTIVE | OUTPATIENT
Start: 2024-11-20 | End: 2024-11-21

## 2024-11-20 RX ORDER — MIDAZOLAM HYDROCHLORIDE 1 MG/ML
INJECTION, SOLUTION INTRAMUSCULAR; INTRAVENOUS AS NEEDED
Status: DISCONTINUED | OUTPATIENT
Start: 2024-11-20 | End: 2024-11-20

## 2024-11-20 RX ORDER — PHENYLEPHRINE HCL IN 0.9% NACL 1 MG/10 ML
SYRINGE (ML) INTRAVENOUS AS NEEDED
Status: DISCONTINUED | OUTPATIENT
Start: 2024-11-20 | End: 2024-11-20

## 2024-11-20 RX ORDER — OXYCODONE HCL 5 MG/5 ML
5 SOLUTION, ORAL ORAL EVERY 6 HOURS PRN
Status: DISCONTINUED | OUTPATIENT
Start: 2024-11-20 | End: 2024-11-22 | Stop reason: HOSPADM

## 2024-11-20 RX ORDER — FENTANYL CITRATE 50 UG/ML
50 INJECTION, SOLUTION INTRAMUSCULAR; INTRAVENOUS EVERY 5 MIN PRN
Status: DISCONTINUED | OUTPATIENT
Start: 2024-11-20 | End: 2024-11-20 | Stop reason: HOSPADM

## 2024-11-20 RX ORDER — GABAPENTIN 300 MG/1
600 CAPSULE ORAL ONCE
Status: COMPLETED | OUTPATIENT
Start: 2024-11-20 | End: 2024-11-20

## 2024-11-20 RX ORDER — NALOXONE HYDROCHLORIDE 1 MG/ML
0.2 INJECTION INTRAMUSCULAR; INTRAVENOUS; SUBCUTANEOUS EVERY 5 MIN PRN
Status: DISCONTINUED | OUTPATIENT
Start: 2024-11-20 | End: 2024-11-22 | Stop reason: HOSPADM

## 2024-11-20 RX ORDER — ALBUMIN HUMAN 50 G/1000ML
12.5 SOLUTION INTRAVENOUS ONCE
Status: COMPLETED | OUTPATIENT
Start: 2024-11-20 | End: 2024-11-20

## 2024-11-20 RX ORDER — SIMETHICONE 80 MG
80 TABLET,CHEWABLE ORAL EVERY 4 HOURS PRN
Status: DISCONTINUED | OUTPATIENT
Start: 2024-11-20 | End: 2024-11-22 | Stop reason: HOSPADM

## 2024-11-20 RX ORDER — METOCLOPRAMIDE 10 MG/1
10 TABLET ORAL EVERY 6 HOURS PRN
Status: DISCONTINUED | OUTPATIENT
Start: 2024-11-20 | End: 2024-11-22 | Stop reason: HOSPADM

## 2024-11-20 RX ORDER — CEFAZOLIN SODIUM 2 G/100ML
2 INJECTION, SOLUTION INTRAVENOUS EVERY 8 HOURS
Status: DISPENSED | OUTPATIENT
Start: 2024-11-20 | End: 2024-11-21

## 2024-11-20 RX ORDER — ONDANSETRON HYDROCHLORIDE 2 MG/ML
4 INJECTION, SOLUTION INTRAVENOUS EVERY 8 HOURS PRN
Status: DISCONTINUED | OUTPATIENT
Start: 2024-11-20 | End: 2024-11-22 | Stop reason: HOSPADM

## 2024-11-20 RX ORDER — HEPARIN SODIUM 5000 [USP'U]/ML
5000 INJECTION, SOLUTION INTRAVENOUS; SUBCUTANEOUS ONCE
Status: COMPLETED | OUTPATIENT
Start: 2024-11-20 | End: 2024-11-20

## 2024-11-20 RX ORDER — PROPOFOL 10 MG/ML
INJECTION, EMULSION INTRAVENOUS AS NEEDED
Status: DISCONTINUED | OUTPATIENT
Start: 2024-11-20 | End: 2024-11-20

## 2024-11-20 RX ORDER — ESOMEPRAZOLE MAGNESIUM 40 MG/1
40 GRANULE, DELAYED RELEASE ORAL
Status: DISCONTINUED | OUTPATIENT
Start: 2024-11-21 | End: 2024-11-22 | Stop reason: HOSPADM

## 2024-11-20 RX ORDER — SCOLOPAMINE TRANSDERMAL SYSTEM 1 MG/1
1 PATCH, EXTENDED RELEASE TRANSDERMAL ONCE
Status: DISCONTINUED | OUTPATIENT
Start: 2024-11-20 | End: 2024-11-22 | Stop reason: HOSPADM

## 2024-11-20 RX ORDER — PANTOPRAZOLE SODIUM 40 MG/10ML
40 INJECTION, POWDER, LYOPHILIZED, FOR SOLUTION INTRAVENOUS
Status: DISCONTINUED | OUTPATIENT
Start: 2024-11-21 | End: 2024-11-22 | Stop reason: HOSPADM

## 2024-11-20 RX ORDER — SUCCINYLCHOLINE/SOD CL,ISO/PF 100 MG/5ML
SYRINGE (ML) INTRAVENOUS AS NEEDED
Status: DISCONTINUED | OUTPATIENT
Start: 2024-11-20 | End: 2024-11-20

## 2024-11-20 RX ORDER — LIDOCAINE HYDROCHLORIDE 10 MG/ML
0.1 INJECTION, SOLUTION INFILTRATION; PERINEURAL ONCE
Status: DISCONTINUED | OUTPATIENT
Start: 2024-11-20 | End: 2024-11-20 | Stop reason: HOSPADM

## 2024-11-20 RX ORDER — FENTANYL CITRATE 50 UG/ML
25 INJECTION, SOLUTION INTRAMUSCULAR; INTRAVENOUS EVERY 5 MIN PRN
Status: DISCONTINUED | OUTPATIENT
Start: 2024-11-20 | End: 2024-11-20 | Stop reason: HOSPADM

## 2024-11-20 RX ORDER — ROCURONIUM BROMIDE 10 MG/ML
INJECTION, SOLUTION INTRAVENOUS AS NEEDED
Status: DISCONTINUED | OUTPATIENT
Start: 2024-11-20 | End: 2024-11-20

## 2024-11-20 RX ORDER — HYDROMORPHONE HYDROCHLORIDE 1 MG/ML
0.2 INJECTION, SOLUTION INTRAMUSCULAR; INTRAVENOUS; SUBCUTANEOUS
Status: DISCONTINUED | OUTPATIENT
Start: 2024-11-20 | End: 2024-11-20

## 2024-11-20 SDOH — SOCIAL STABILITY: SOCIAL NETWORK: HOW OFTEN DO YOU ATTEND MEETINGS OF THE CLUBS OR ORGANIZATIONS YOU BELONG TO?: NEVER

## 2024-11-20 SDOH — ECONOMIC STABILITY: HOUSING INSECURITY: IN THE PAST 12 MONTHS, HOW MANY TIMES HAVE YOU MOVED WHERE YOU WERE LIVING?: 1

## 2024-11-20 SDOH — HEALTH STABILITY: MENTAL HEALTH
DO YOU FEEL STRESS - TENSE, RESTLESS, NERVOUS, OR ANXIOUS, OR UNABLE TO SLEEP AT NIGHT BECAUSE YOUR MIND IS TROUBLED ALL THE TIME - THESE DAYS?: NOT AT ALL

## 2024-11-20 SDOH — SOCIAL STABILITY: SOCIAL INSECURITY
WITHIN THE LAST YEAR, HAVE YOU BEEN KICKED, HIT, SLAPPED, OR OTHERWISE PHYSICALLY HURT BY YOUR PARTNER OR EX-PARTNER?: NO

## 2024-11-20 SDOH — SOCIAL STABILITY: SOCIAL INSECURITY: HAVE YOU HAD ANY THOUGHTS OF HARMING ANYONE ELSE?: NO

## 2024-11-20 SDOH — HEALTH STABILITY: MENTAL HEALTH: CURRENT SMOKER: 0

## 2024-11-20 SDOH — ECONOMIC STABILITY: FOOD INSECURITY: WITHIN THE PAST 12 MONTHS, THE FOOD YOU BOUGHT JUST DIDN'T LAST AND YOU DIDN'T HAVE MONEY TO GET MORE.: NEVER TRUE

## 2024-11-20 SDOH — HEALTH STABILITY: PHYSICAL HEALTH: ON AVERAGE, HOW MANY DAYS PER WEEK DO YOU ENGAGE IN MODERATE TO STRENUOUS EXERCISE (LIKE A BRISK WALK)?: 7 DAYS

## 2024-11-20 SDOH — SOCIAL STABILITY: SOCIAL INSECURITY: HAS ANYONE EVER THREATENED TO HURT YOUR FAMILY OR YOUR PETS?: NO

## 2024-11-20 SDOH — SOCIAL STABILITY: SOCIAL INSECURITY
WITHIN THE LAST YEAR, HAVE YOU BEEN RAPED OR FORCED TO HAVE ANY KIND OF SEXUAL ACTIVITY BY YOUR PARTNER OR EX-PARTNER?: NO

## 2024-11-20 SDOH — SOCIAL STABILITY: SOCIAL INSECURITY: DOES ANYONE TRY TO KEEP YOU FROM HAVING/CONTACTING OTHER FRIENDS OR DOING THINGS OUTSIDE YOUR HOME?: NO

## 2024-11-20 SDOH — SOCIAL STABILITY: SOCIAL INSECURITY: WITHIN THE LAST YEAR, HAVE YOU BEEN HUMILIATED OR EMOTIONALLY ABUSED IN OTHER WAYS BY YOUR PARTNER OR EX-PARTNER?: NO

## 2024-11-20 SDOH — ECONOMIC STABILITY: HOUSING INSECURITY: IN THE LAST 12 MONTHS, WAS THERE A TIME WHEN YOU WERE NOT ABLE TO PAY THE MORTGAGE OR RENT ON TIME?: NO

## 2024-11-20 SDOH — HEALTH STABILITY: MENTAL HEALTH: HOW MANY DRINKS CONTAINING ALCOHOL DO YOU HAVE ON A TYPICAL DAY WHEN YOU ARE DRINKING?: 1 OR 2

## 2024-11-20 SDOH — ECONOMIC STABILITY: INCOME INSECURITY: IN THE PAST 12 MONTHS HAS THE ELECTRIC, GAS, OIL, OR WATER COMPANY THREATENED TO SHUT OFF SERVICES IN YOUR HOME?: NO

## 2024-11-20 SDOH — SOCIAL STABILITY: SOCIAL INSECURITY: DO YOU FEEL UNSAFE GOING BACK TO THE PLACE WHERE YOU ARE LIVING?: NO

## 2024-11-20 SDOH — HEALTH STABILITY: MENTAL HEALTH: HOW OFTEN DO YOU HAVE SIX OR MORE DRINKS ON ONE OCCASION?: NEVER

## 2024-11-20 SDOH — SOCIAL STABILITY: SOCIAL INSECURITY: WITHIN THE LAST YEAR, HAVE YOU BEEN AFRAID OF YOUR PARTNER OR EX-PARTNER?: NO

## 2024-11-20 SDOH — ECONOMIC STABILITY: HOUSING INSECURITY: AT ANY TIME IN THE PAST 12 MONTHS, WERE YOU HOMELESS OR LIVING IN A SHELTER (INCLUDING NOW)?: NO

## 2024-11-20 SDOH — HEALTH STABILITY: PHYSICAL HEALTH: ON AVERAGE, HOW MANY MINUTES DO YOU ENGAGE IN EXERCISE AT THIS LEVEL?: 30 MIN

## 2024-11-20 SDOH — HEALTH STABILITY: PHYSICAL HEALTH
HOW OFTEN DO YOU NEED TO HAVE SOMEONE HELP YOU WHEN YOU READ INSTRUCTIONS, PAMPHLETS, OR OTHER WRITTEN MATERIAL FROM YOUR DOCTOR OR PHARMACY?: NEVER

## 2024-11-20 SDOH — ECONOMIC STABILITY: FOOD INSECURITY: HOW HARD IS IT FOR YOU TO PAY FOR THE VERY BASICS LIKE FOOD, HOUSING, MEDICAL CARE, AND HEATING?: NOT HARD AT ALL

## 2024-11-20 SDOH — ECONOMIC STABILITY: FOOD INSECURITY: WITHIN THE PAST 12 MONTHS, YOU WORRIED THAT YOUR FOOD WOULD RUN OUT BEFORE YOU GOT THE MONEY TO BUY MORE.: NEVER TRUE

## 2024-11-20 SDOH — SOCIAL STABILITY: SOCIAL NETWORK
DO YOU BELONG TO ANY CLUBS OR ORGANIZATIONS SUCH AS CHURCH GROUPS, UNIONS, FRATERNAL OR ATHLETIC GROUPS, OR SCHOOL GROUPS?: NO

## 2024-11-20 SDOH — SOCIAL STABILITY: SOCIAL INSECURITY: ARE YOU OR HAVE YOU BEEN THREATENED OR ABUSED PHYSICALLY, EMOTIONALLY, OR SEXUALLY BY ANYONE?: NO

## 2024-11-20 SDOH — SOCIAL STABILITY: SOCIAL INSECURITY: HAVE YOU HAD THOUGHTS OF HARMING ANYONE ELSE?: NO

## 2024-11-20 SDOH — SOCIAL STABILITY: SOCIAL INSECURITY

## 2024-11-20 SDOH — SOCIAL STABILITY: SOCIAL INSECURITY: ARE YOU MARRIED, WIDOWED, DIVORCED, SEPARATED, NEVER MARRIED, OR LIVING WITH A PARTNER?: MARRIED

## 2024-11-20 SDOH — SOCIAL STABILITY: SOCIAL INSECURITY: WERE YOU ABLE TO COMPLETE ALL THE BEHAVIORAL HEALTH SCREENINGS?: YES

## 2024-11-20 SDOH — SOCIAL STABILITY: SOCIAL NETWORK: HOW OFTEN DO YOU ATTEND CHURCH OR RELIGIOUS SERVICES?: NEVER

## 2024-11-20 SDOH — SOCIAL STABILITY: SOCIAL NETWORK: HOW OFTEN DO YOU GET TOGETHER WITH FRIENDS OR RELATIVES?: ONCE A WEEK

## 2024-11-20 SDOH — ECONOMIC STABILITY: TRANSPORTATION INSECURITY: IN THE PAST 12 MONTHS, HAS LACK OF TRANSPORTATION KEPT YOU FROM MEDICAL APPOINTMENTS OR FROM GETTING MEDICATIONS?: NO

## 2024-11-20 SDOH — SOCIAL STABILITY: SOCIAL INSECURITY: DO YOU FEEL ANYONE HAS EXPLOITED OR TAKEN ADVANTAGE OF YOU FINANCIALLY OR OF YOUR PERSONAL PROPERTY?: NO

## 2024-11-20 SDOH — SOCIAL STABILITY: SOCIAL INSECURITY: ARE THERE ANY APPARENT SIGNS OF INJURIES/BEHAVIORS THAT COULD BE RELATED TO ABUSE/NEGLECT?: NO

## 2024-11-20 SDOH — HEALTH STABILITY: MENTAL HEALTH: HOW OFTEN DO YOU HAVE A DRINK CONTAINING ALCOHOL?: MONTHLY OR LESS

## 2024-11-20 SDOH — HEALTH STABILITY: MENTAL HEALTH

## 2024-11-20 SDOH — SOCIAL STABILITY: SOCIAL INSECURITY: ABUSE: ADULT

## 2024-11-20 SDOH — SOCIAL STABILITY: SOCIAL NETWORK
IN A TYPICAL WEEK, HOW MANY TIMES DO YOU TALK ON THE PHONE WITH FAMILY, FRIENDS, OR NEIGHBORS?: MORE THAN THREE TIMES A WEEK

## 2024-11-20 ASSESSMENT — ACTIVITIES OF DAILY LIVING (ADL)
FEEDING YOURSELF: INDEPENDENT
LACK_OF_TRANSPORTATION: NO
LACK_OF_TRANSPORTATION: NO
HEARING - RIGHT EAR: FUNCTIONAL
DRESSING YOURSELF: INDEPENDENT
GROOMING: INDEPENDENT
HEARING - LEFT EAR: FUNCTIONAL
TOILETING: INDEPENDENT
WALKS IN HOME: INDEPENDENT
PATIENT'S MEMORY ADEQUATE TO SAFELY COMPLETE DAILY ACTIVITIES?: YES
JUDGMENT_ADEQUATE_SAFELY_COMPLETE_DAILY_ACTIVITIES: YES
ADEQUATE_TO_COMPLETE_ADL: YES
BATHING: INDEPENDENT

## 2024-11-20 ASSESSMENT — PAIN DESCRIPTION - LOCATION
LOCATION: ABDOMEN

## 2024-11-20 ASSESSMENT — PAIN SCALES - GENERAL
PAINLEVEL_OUTOF10: 2
PAINLEVEL_OUTOF10: 0 - NO PAIN
PAINLEVEL_OUTOF10: 2
PAINLEVEL_OUTOF10: 0 - NO PAIN
PAINLEVEL_OUTOF10: 8
PAINLEVEL_OUTOF10: 2
PAIN_LEVEL: 2
PAINLEVEL_OUTOF10: 0 - NO PAIN
PAINLEVEL_OUTOF10: 2
PAINLEVEL_OUTOF10: 0 - NO PAIN
PAINLEVEL_OUTOF10: 8
PAINLEVEL_OUTOF10: 2
PAINLEVEL_OUTOF10: 5 - MODERATE PAIN

## 2024-11-20 ASSESSMENT — COLUMBIA-SUICIDE SEVERITY RATING SCALE - C-SSRS
1. IN THE PAST MONTH, HAVE YOU WISHED YOU WERE DEAD OR WISHED YOU COULD GO TO SLEEP AND NOT WAKE UP?: NO
6. HAVE YOU EVER DONE ANYTHING, STARTED TO DO ANYTHING, OR PREPARED TO DO ANYTHING TO END YOUR LIFE?: NO
2. HAVE YOU ACTUALLY HAD ANY THOUGHTS OF KILLING YOURSELF?: NO

## 2024-11-20 ASSESSMENT — LIFESTYLE VARIABLES
HOW OFTEN DO YOU HAVE A DRINK CONTAINING ALCOHOL: MONTHLY OR LESS
AUDIT-C TOTAL SCORE: 1
AUDIT-C TOTAL SCORE: 1
HOW MANY STANDARD DRINKS CONTAINING ALCOHOL DO YOU HAVE ON A TYPICAL DAY: 1 OR 2
PRESCIPTION_ABUSE_PAST_12_MONTHS: NO
HOW OFTEN DO YOU HAVE 6 OR MORE DRINKS ON ONE OCCASION: NEVER
AUDIT-C TOTAL SCORE: 1
SKIP TO QUESTIONS 9-10: 1
SKIP TO QUESTIONS 9-10: 1
SUBSTANCE_ABUSE_PAST_12_MONTHS: NO

## 2024-11-20 ASSESSMENT — COGNITIVE AND FUNCTIONAL STATUS - GENERAL
MOVING TO AND FROM BED TO CHAIR: A LITTLE
PATIENT BASELINE BEDBOUND: NO
MOBILITY SCORE: 21
DAILY ACTIVITIY SCORE: 24
CLIMB 3 TO 5 STEPS WITH RAILING: A LITTLE
STANDING UP FROM CHAIR USING ARMS: A LITTLE

## 2024-11-20 ASSESSMENT — PAIN DESCRIPTION - ORIENTATION
ORIENTATION: MID
ORIENTATION: MID

## 2024-11-20 ASSESSMENT — PATIENT HEALTH QUESTIONNAIRE - PHQ9
1. LITTLE INTEREST OR PLEASURE IN DOING THINGS: NOT AT ALL
2. FEELING DOWN, DEPRESSED OR HOPELESS: NOT AT ALL
SUM OF ALL RESPONSES TO PHQ9 QUESTIONS 1 & 2: 0

## 2024-11-20 ASSESSMENT — PAIN - FUNCTIONAL ASSESSMENT: PAIN_FUNCTIONAL_ASSESSMENT: 0-10

## 2024-11-20 NOTE — ANESTHESIA PROCEDURE NOTES
Airway  Date/Time: 11/20/2024 4:31 PM  Urgency: elective    Airway not difficult    Staffing  Performed: CRNA   Authorized by: Jude Amos MD    Performed by: MADHU Martinez-CRNA  Patient location during procedure: OR    Indications and Patient Condition  Indications for airway management: anesthesia  Spontaneous Ventilation: absent  Sedation level: deep  Preoxygenated: yes  Patient position: sniffing  Mask difficulty assessment: 1 - vent by mask  Planned trial extubation    Final Airway Details  Final airway type: endotracheal airway      Successful airway: ETT  Cuffed: yes   Successful intubation technique: direct laryngoscopy  Facilitating devices/methods: intubating stylet and cricoid pressure (modified RSI)  Endotracheal tube insertion site: oral  Blade: Jailyn  Blade size: #4  ETT size (mm): 7.0  Cormack-Lehane Classification: grade IIb - view of arytenoids or posterior of glottis only  Placement verified by: chest auscultation, capnometry and palpation of cuff   Cuff volume (mL): 7  Measured from: lips  ETT to lips (cm): 21  Number of attempts at approach: 1  Ventilation between attempts: none  Number of other approaches attempted: 0    Additional Comments  Modified RSI with cricoid pressure held and maintained from the beginning of sedation until confirmed EtCO2.

## 2024-11-20 NOTE — OP NOTE
LAPAROSCOPIC EXPLORATION/ LYSIS OF ADHESIONS/ SMALL BOWEL RESECTION WITH ANASTOMOSIS (REVISION OF JEJUNOSTOMY) EGD/ TAPBLOCK Operative Note     Date: 2024  OR Location: [facility]    Name: Malathi Castellanos, : 1965, Age: 59 y.o., MRN: [id number], Sex: female    Diagnosis  Pre-op Diagnosis      * Bariatric surgery status [Z98.84] Post-op Diagnosis     * Bariatric surgery status [Z98.84]     Procedures  LAPAROSCOPIC EXPLORATION/ LYSIS OF ADHESIONS/ SMALL BOWEL RESECTION WITH ANASTOMOSIS (REVISION OF JEJUNOSTOMY) EGD/ TAPBLOCK  20066 - ME LAPS ABD PRTM&OMENTUM DX W/WO SPEC BR/WA SPX    LAPAROSCOPIC EXPLORATION/ LYSIS OF ADHESIONS/ SMALL BOWEL RESECTION WITH ANASTOMOSIS (REVISION OF JEJUNOSTOMY) EGD/ TAPBLOCK  03748 - ME UNLISTED LAPAROSCOPY PROCEDURE STOMACH    ME EGD TRANSORAL BIOPSY SINGLE/MULTIPLE [33452]  Surgeons      * Leatha Hamilton - Primary    Resident/Fellow/Other Assistant:  Surgeons and Role:  * No surgeons found with a matching role *    Staff:   Circulator: Lori  Surgical Assistant: Evans Butcherub Person: Malathi Vanegsa Circulator: Shawnee Vanegas Scrub: Reza  Scrub Person: Traci  Scrub Person: Bridgette    Anesthesia Staff: Anesthesiologist: Jude Amos MD; Bola Solorio MD  CRNA: MADHU Martinez-CRNA    Procedure Summary  Anesthesia: General  ASA: II  Estimated Blood Loss: 25 mL  Intra-op Medications: Administrations occurring from 24 to 24:  * No intraprocedure medications in log *           Anesthesia Record               Intraprocedure I/O Totals          Intake    lactated Ringer's infusion 1000.00 mL    Total Intake 1000 mL          Specimen:   ID Type Source Tests Collected by Time   1 : small bowel anastomosis Tissue SMALL BOWEL /INTESTINE SEGMENTAL RESECTION SURGICAL PATHOLOGY EXAM Leatha Hamilton MD 2024 1723                 Drains and/or Catheters: * None in log *    Tourniquet Times:         Implants:     Findings: Adhesions, dilated and patulous  JJ , normal EGD    Indications: Malathi Castellanos is an 59 y.o. female who is having surgery for Bariatric surgery status [Z98.84].     The patient was seen in the preoperative area. The risks, benefits, complications, treatment options, non-operative alternatives, expected recovery and outcomes were discussed with the patient. The possibilities of reaction to medication, pulmonary aspiration, injury to surrounding structures, bleeding, recurrent infection, the need for additional procedures, failure to diagnose a condition, and creating a complication requiring transfusion or operation were discussed with the patient. The patient concurred with the proposed plan, giving informed consent.  The site of surgery was properly noted/marked if necessary per policy. The patient has been actively warmed in preoperative area. Preoperative antibiotics have been ordered and given within 1 hours of incision. Venous thrombosis prophylaxis have been ordered including bilateral sequential compression devices and chemical prophylaxis    Procedure Details:     Patient is a 59-year-old female with history of gastric bypass in the past who has been having abdominal pain symptoms and was being evaluated by GI underwent endoscopy and imaging studies which revealed intussusception at the jejunojejunostomy.  Patient continued to have symptoms and repeat CT scan confirmed the same findings again so decision was made to revise the jejunojejunostomy and related procedures.  On the day of surgery after verification of informed consent she was given subcu heparin, was taken to the OR, placed in supine position on the table, SCDs were placed, huddle was done, antibiotics were given, general anesthesia was established, standard, sterile preparation and draping of abdominal wall was performed.  Abdominal cavity was entered in the left upper quadrant using 5 mm optical trocar and omental adhesions were noted around the entry site.  Entry was then  obtained using 5 mm optical trocar in the right subcostal margin without technical problems.  Pneumoperitoneum was established.  12 mm port was placed in the right upper quadrant and using LigaSure adhesions around the left midclavicular port site were taken down and it 5 mm port was placed in the left axillary line.  Patient was noted to have extensive adhesions of the omentum to the abdominal wall to a previous hernia repair/abdominoplasty sutures and scars.  Small bowel was relatively free of adhesions underneath.  We identified the anatomy.  Yris limb was 100 cm, biliopancreatic limb was 50 cm.  Jejunojejunostomy was noted to be dilated and patulous although no intussusception was noted at the time of the laparoscopy although BP limb closer to the anastomosis was somewhat congested in appearance likely representing the area of intussusception.  There was no evidence of internal hernia.  Common channel was roughly 400 cm long and no other pathology was noted in the abdominal cavity to explain patient's symptoms.  We decided to revise the jejunojejunostomy as had been discussed with the patient at length in the clinical visits.  A window was in the mesentery of the biliopancreatic limb closer to the anastomosis and small bowel was divided with 60 mm white stapler.  Small bowel was then divided immediately distal to the jejunojejunostomy using 60 mm white stapler as well.  Distal end of the previous biliopancreatic limb and proximal end of the previous common channel now were placed side-to-side with stay sutures and enterotomies were made with the LigaSure device and 60 mm white stapler was fired in linear fashion creating side-to-side anastomosis.  Common channel of the enterotomy was closed with 60 mm white stapler in longitudinal fashion as well.  New mesenteric defect which had been created with this anastomosis creation was closed with running 2-0 Surgidac.  This established the anatomy of the small bowel.   Now we made a window in the mesentery of the Yris limb proximal to the jejunojejunostomy and divided that with 60 mm white stapler.  Using LigaSure device mesentery closer to the previous patulous jejunojejunostomy was then divided and the specimen was then placed on the side.  Distal staple line of the Yris limb now was brought 50 cm distal to the revised connection in the biliopancreatic area and stay sutures were placed to keep the orientation of the bowel together.  Enterotomies were made in the antimesenteric border and 60 mm white stapler was fired creating a side-to-side anastomosis and then common channel of the enterotomy was closed with 60 mm white stapler as well.  Mesenteric defect in that area was also closed with running 2-0 Surgidac.  With this maneuver we had created a new jejunojejunostomy and had functionally lengthen the biliopancreatic limb by 50 cm as well.  Thus leaving a Yris limb of 100 cm from the mesocolon down to anastomosis and 350 cm of the common channel.  Residual adhesions of the omentum in the epigastric area were then taken down using LigaSure device with the help of sharp and blunt dissection.  There was no direct bowel involvement or manipulation required.  Gastric remnant and pouch area had extensive adhesions to undersurface of left lobe of liver and that area was not explored.  On the right side patient had adhesions to the gallbladder fossa that area was not explored as well. At this point an endoscopy was performed.  Normal gastric bypass anatomy was noted, there was no evidence of any ulcer, fistula or any other significant pathology.  The jejunojejunostomy specimen was retrieved via Endobag through the 12 mm port site.  Fascial defect in that area was closed with interrupted 0 Vicryl sutures using Endo passer under laparoscopic vision.  After final satisfactory examination abdominal cavity residual trocars were removed, pneumoperitoneum was discontinued, skin was closed  with 3-0 Monocryl.  Dermabond was applied.  Patient tolerated the operation well, was extubated in the OR and transferred to recovery room in stable condition.      Complications:  None; patient tolerated the procedure well.    Disposition: PACU - hemodynamically stable.  Condition: stable         Task Performed by RNFA or Surgical Assistant:  Assistance with camera control and skin closure          Additional Details: Dr. Camacho was scrubbed and actively assisted in the laparoscopic and endoscopic components of the operation, there was no qualified resident available.      Attending Attestation: I was present and scrubbed for the entire procedure.    Leatha Hamilton  Phone Number: [phone]

## 2024-11-20 NOTE — ANESTHESIA PREPROCEDURE EVALUATION
Monitor. Patient: Malathi Castellanos    Procedure Information       Date/Time: 11/20/24 1500    Procedure: LAPAROSCOPIC EXPLORATION    Location: [facility] / [facility]    Surgeons: Leatha Hamilton MD            Relevant Problems   Anesthesia   (+) PONV (postoperative nausea and vomiting)      Pulmonary   (+) Pulmonary nodules      Endocrine   (+) Acquired hypothyroidism      Hematology   (+) Anemia      HEENT   (+) Chronic sinusitis   (+) Mixed hearing loss of left ear   (+) Sensorineural hearing loss of right ear       Clinical information reviewed:    Allergies  Meds     OB Status           NPO Detail:  NPO/Void Status  Carbohydrate Drink Given Prior to Surgery? : N  Date of Last Liquid: 11/20/24  Time of Last Liquid: 0800  Date of Last Solid: 11/18/24  Time of Last Solid: 1800  Last Intake Type: Clear fluids  Time of Last Void: 1300         Physical Exam    Airway  Mallampati: II  TM distance: >3 FB  Neck ROM: full     Cardiovascular - normal exam     Dental        Pulmonary - normal exam     Abdominal   (+) obese             Anesthesia Plan    History of general anesthesia?: yes  History of complications of general anesthesia?: no    ASA 2     general     The patient is not a current smoker.  Patient was previously instructed to abstain from smoking on day of procedure.  Patient did not smoke on day of procedure.    intravenous induction   Postoperative administration of opioids is intended.  Trial extubation is planned.  Anesthetic plan and risks discussed with patient.  Use of blood products discussed with patient who consented to blood products.    Plan discussed with CRNA.

## 2024-11-20 NOTE — ANESTHESIA POSTPROCEDURE EVALUATION
Patient: Malathi Castellanos    Procedure Summary       Date: 11/20/24 Room / Location: [facility] / [facility]    Anesthesia Start: 1619 Anesthesia Stop: 1820    Procedure: LAPAROSCOPIC EXPLORATION/ LYSIS OF ADHESIONS/ SMALL BOWEL RESECTION WITH ANASTOMOSIS (REVISION OF JEJUNOSTOMY) EGD/ TAPBLOCK Diagnosis:       Bariatric surgery status      (Bariatric surgery status [Z98.84])    Surgeons: Leatha Hamilton MD Responsible Provider: Bola Solorio MD    Anesthesia Type: general ASA Status: 2            Anesthesia Type: general    Vitals Value Taken Time   BP 99/56 11/20/24 1819   Temp 36.7 11/20/24 1820   Pulse 69 11/20/24 1819   Resp 16 11/20/24 1820   SpO2 100 % 11/20/24 1819   Vitals shown include unfiled device data.    Anesthesia Post Evaluation    Patient location during evaluation: PACU  Patient participation: complete - patient participated  Level of consciousness: awake and alert  Pain score: 2  Pain management: adequate  Airway patency: patent  Cardiovascular status: acceptable and hemodynamically stable  Respiratory status: acceptable, spontaneous ventilation and nasal cannula  Hydration status: acceptable  Postoperative Nausea and Vomiting: none        There were no known notable events for this encounter.

## 2024-11-21 ENCOUNTER — APPOINTMENT (OUTPATIENT)
Dept: SURGERY | Facility: CLINIC | Age: 59
End: 2024-11-21
Payer: COMMERCIAL

## 2024-11-21 LAB
ALBUMIN SERPL BCP-MCNC: 3.6 G/DL (ref 3.4–5)
ALP SERPL-CCNC: 64 U/L (ref 33–110)
ALT SERPL W P-5'-P-CCNC: 12 U/L (ref 7–45)
ANION GAP SERPL CALC-SCNC: 11 MMOL/L (ref 10–20)
AST SERPL W P-5'-P-CCNC: 22 U/L (ref 9–39)
BASOPHILS # BLD AUTO: 0.03 X10*3/UL (ref 0–0.1)
BASOPHILS NFR BLD AUTO: 0.2 %
BILIRUB SERPL-MCNC: 1.5 MG/DL (ref 0–1.2)
BUN SERPL-MCNC: 12 MG/DL (ref 6–23)
CALCIUM SERPL-MCNC: 8.6 MG/DL (ref 8.6–10.3)
CHLORIDE SERPL-SCNC: 103 MMOL/L (ref 98–107)
CO2 SERPL-SCNC: 26 MMOL/L (ref 21–32)
CREAT SERPL-MCNC: 0.68 MG/DL (ref 0.5–1.05)
EGFRCR SERPLBLD CKD-EPI 2021: >90 ML/MIN/1.73M*2
EOSINOPHIL # BLD AUTO: 0.01 X10*3/UL (ref 0–0.7)
EOSINOPHIL NFR BLD AUTO: 0.1 %
ERYTHROCYTE [DISTWIDTH] IN BLOOD BY AUTOMATED COUNT: 16 % (ref 11.5–14.5)
GLUCOSE SERPL-MCNC: 94 MG/DL (ref 74–99)
HCT VFR BLD AUTO: 28.5 % (ref 36–46)
HGB BLD-MCNC: 8.7 G/DL (ref 12–16)
HOLD SPECIMEN: NORMAL
IMM GRANULOCYTES # BLD AUTO: 0.08 X10*3/UL (ref 0–0.7)
IMM GRANULOCYTES NFR BLD AUTO: 0.6 % (ref 0–0.9)
LYMPHOCYTES # BLD AUTO: 1.4 X10*3/UL (ref 1.2–4.8)
LYMPHOCYTES NFR BLD AUTO: 10.8 %
MCH RBC QN AUTO: 24.4 PG (ref 26–34)
MCHC RBC AUTO-ENTMCNC: 30.5 G/DL (ref 32–36)
MCV RBC AUTO: 80 FL (ref 80–100)
MONOCYTES # BLD AUTO: 0.78 X10*3/UL (ref 0.1–1)
MONOCYTES NFR BLD AUTO: 6 %
NEUTROPHILS # BLD AUTO: 10.69 X10*3/UL (ref 1.2–7.7)
NEUTROPHILS NFR BLD AUTO: 82.3 %
NRBC BLD-RTO: 0 /100 WBCS (ref 0–0)
PLATELET # BLD AUTO: 361 X10*3/UL (ref 150–450)
POTASSIUM SERPL-SCNC: 3.4 MMOL/L (ref 3.5–5.3)
PROT SERPL-MCNC: 5.9 G/DL (ref 6.4–8.2)
RBC # BLD AUTO: 3.57 X10*6/UL (ref 4–5.2)
SODIUM SERPL-SCNC: 137 MMOL/L (ref 136–145)
WBC # BLD AUTO: 13 X10*3/UL (ref 4.4–11.3)

## 2024-11-21 PROCEDURE — 99024 POSTOP FOLLOW-UP VISIT: CPT | Performed by: STUDENT IN AN ORGANIZED HEALTH CARE EDUCATION/TRAINING PROGRAM

## 2024-11-21 PROCEDURE — 80053 COMPREHEN METABOLIC PANEL: CPT | Performed by: STUDENT IN AN ORGANIZED HEALTH CARE EDUCATION/TRAINING PROGRAM

## 2024-11-21 PROCEDURE — 85025 COMPLETE CBC W/AUTO DIFF WBC: CPT | Performed by: STUDENT IN AN ORGANIZED HEALTH CARE EDUCATION/TRAINING PROGRAM

## 2024-11-21 PROCEDURE — 2500000002 HC RX 250 W HCPCS SELF ADMINISTERED DRUGS (ALT 637 FOR MEDICARE OP, ALT 636 FOR OP/ED): Performed by: STUDENT IN AN ORGANIZED HEALTH CARE EDUCATION/TRAINING PROGRAM

## 2024-11-21 PROCEDURE — 2500000001 HC RX 250 WO HCPCS SELF ADMINISTERED DRUGS (ALT 637 FOR MEDICARE OP): Performed by: STUDENT IN AN ORGANIZED HEALTH CARE EDUCATION/TRAINING PROGRAM

## 2024-11-21 PROCEDURE — 1100000001 HC PRIVATE ROOM DAILY

## 2024-11-21 PROCEDURE — 2500000004 HC RX 250 GENERAL PHARMACY W/ HCPCS (ALT 636 FOR OP/ED): Mod: JZ | Performed by: STUDENT IN AN ORGANIZED HEALTH CARE EDUCATION/TRAINING PROGRAM

## 2024-11-21 PROCEDURE — 94760 N-INVAS EAR/PLS OXIMETRY 1: CPT

## 2024-11-21 PROCEDURE — 2500000004 HC RX 250 GENERAL PHARMACY W/ HCPCS (ALT 636 FOR OP/ED): Performed by: SURGERY

## 2024-11-21 PROCEDURE — 36415 COLL VENOUS BLD VENIPUNCTURE: CPT | Performed by: STUDENT IN AN ORGANIZED HEALTH CARE EDUCATION/TRAINING PROGRAM

## 2024-11-21 RX ORDER — LEVOTHYROXINE SODIUM 50 UG/1
50 TABLET ORAL DAILY
Status: DISCONTINUED | OUTPATIENT
Start: 2024-11-21 | End: 2024-11-22 | Stop reason: HOSPADM

## 2024-11-21 RX ORDER — POTASSIUM CHLORIDE 1.5 G/1.58G
40 POWDER, FOR SOLUTION ORAL ONCE
Status: COMPLETED | OUTPATIENT
Start: 2024-11-21 | End: 2024-11-21

## 2024-11-21 RX ORDER — KETOROLAC TROMETHAMINE 30 MG/ML
15 INJECTION, SOLUTION INTRAMUSCULAR; INTRAVENOUS EVERY 6 HOURS
Status: DISCONTINUED | OUTPATIENT
Start: 2024-11-21 | End: 2024-11-22 | Stop reason: HOSPADM

## 2024-11-21 RX ORDER — HYDRALAZINE HYDROCHLORIDE 20 MG/ML
10 INJECTION INTRAMUSCULAR; INTRAVENOUS EVERY 8 HOURS PRN
Status: DISCONTINUED | OUTPATIENT
Start: 2024-11-21 | End: 2024-11-22 | Stop reason: HOSPADM

## 2024-11-21 ASSESSMENT — PAIN SCALES - GENERAL
PAINLEVEL_OUTOF10: 6
PAINLEVEL_OUTOF10: 6
PAINLEVEL_OUTOF10: 0 - NO PAIN
PAINLEVEL_OUTOF10: 6

## 2024-11-21 ASSESSMENT — PAIN - FUNCTIONAL ASSESSMENT
PAIN_FUNCTIONAL_ASSESSMENT: 0-10

## 2024-11-21 ASSESSMENT — ACTIVITIES OF DAILY LIVING (ADL): LACK_OF_TRANSPORTATION: NO

## 2024-11-21 ASSESSMENT — COGNITIVE AND FUNCTIONAL STATUS - GENERAL
MOBILITY SCORE: 23
CLIMB 3 TO 5 STEPS WITH RAILING: A LITTLE

## 2024-11-21 NOTE — CARE PLAN
Problem: Pain - Adult  Goal: Verbalizes/displays adequate comfort level or baseline comfort level  Outcome: Progressing     Problem: Safety - Adult  Goal: Free from fall injury  Outcome: Progressing     Problem: Discharge Planning  Goal: Discharge to home or other facility with appropriate resources  Outcome: Progressing     Problem: Chronic Conditions and Co-morbidities  Goal: Patient's chronic conditions and co-morbidity symptoms are monitored and maintained or improved  Outcome: Progressing   The patient's goals for the shift include comfrt    The clinical goals for the shift include comfort

## 2024-11-21 NOTE — CONSULTS
Consult received for bariatric diet instructions from bariatric order set.   Pt. is s/p LAPAROSCOPIC EXPLORATION/ LYSIS OF ADHESIONS/ SMALL BOWEL RESECTION WITH ANASTOMOSIS (REVISION OF JEJUNOSTOMY) EGD/ TAPBLOCK.   Had gastric bypass ~20 years ago.   Is not on strict post-op bariatric fluid protocol - does not require post-op bariatric nutrition consult.  Reviewed H&P - bariatric physician had recommended following up with dietitian for wt loss and diet modification. Offered information on outpatient nutrition therapy services - pt declined. Pt has no other nutrition concerns or needs at this time.

## 2024-11-21 NOTE — CARE PLAN
The patient's goals for the shift include  comforrt    The clinical goals for the shift include  comfort

## 2024-11-21 NOTE — PROGRESS NOTES
"Malathi Castellanos is a 59 y.o. female on day 1 of admission presenting with Bariatric surgery status.    Subjective   Patient seen this morning. Episodes of hypotension, but asymptomatic and Hb only slightly trended down from 9 to 8.7. Required 2 fluid boluses.       Objective     Physical Exam  Patient is in no acute distress  No respiratory distress  Chest is clear to auscultation  Abdomen is soft, not tender, not distended  No guarding   Incisions are clean dry and intact  Patient is alert and oriented x 3    Last Recorded Vitals  Blood pressure 90/50, pulse 64, temperature 36.9 °C (98.4 °F), temperature source Temporal, resp. rate 16, height 1.702 m (5' 7.01\"), weight 82.6 kg (182 lb 1.6 oz), SpO2 95%.    Intake/Output last 3 Shifts:  I/O last 3 completed shifts:  In: 5867.2 (71 mL/kg) [I.V.:2135 (25.8 mL/kg); Blood:250; IV Piggyback:3482.2]  Out: - (0 mL/kg)   Weight: 82.6 kg     Relevant Results         Results for orders placed or performed during the hospital encounter of 11/20/24 (from the past 24 hours)   VERIFY ABO/Rh Group Test   Result Value Ref Range    ABO TYPE O     Rh TYPE NEG    Type And Screen   Result Value Ref Range    ANTIBODY SCREEN NEG    Abo/Rh   Result Value Ref Range    ABO TYPE O     Rh TYPE NEG    CBC   Result Value Ref Range    WBC 13.4 (H) 4.4 - 11.3 x10*3/uL    nRBC 0.0 0.0 - 0.0 /100 WBCs    RBC 3.67 (L) 4.00 - 5.20 x10*6/uL    Hemoglobin 9.0 (L) 12.0 - 16.0 g/dL    Hematocrit 28.8 (L) 36.0 - 46.0 %    MCV 79 (L) 80 - 100 fL    MCH 24.5 (L) 26.0 - 34.0 pg    MCHC 31.3 (L) 32.0 - 36.0 g/dL    RDW 15.9 (H) 11.5 - 14.5 %    Platelets 350 150 - 450 x10*3/uL   SST TOP   Result Value Ref Range    Extra Tube Hold for add-ons.    Comprehensive metabolic panel   Result Value Ref Range    Glucose 94 74 - 99 mg/dL    Sodium 137 136 - 145 mmol/L    Potassium 3.4 (L) 3.5 - 5.3 mmol/L    Chloride 103 98 - 107 mmol/L    Bicarbonate 26 21 - 32 mmol/L    Anion Gap 11 10 - 20 mmol/L    Urea Nitrogen 12 6 " - 23 mg/dL    Creatinine 0.68 0.50 - 1.05 mg/dL    eGFR >90 >60 mL/min/1.73m*2    Calcium 8.6 8.6 - 10.3 mg/dL    Albumin 3.6 3.4 - 5.0 g/dL    Alkaline Phosphatase 64 33 - 110 U/L    Total Protein 5.9 (L) 6.4 - 8.2 g/dL    AST 22 9 - 39 U/L    Bilirubin, Total 1.5 (H) 0.0 - 1.2 mg/dL    ALT 12 7 - 45 U/L   CBC and Auto Differential   Result Value Ref Range    WBC 13.0 (H) 4.4 - 11.3 x10*3/uL    nRBC 0.0 0.0 - 0.0 /100 WBCs    RBC 3.57 (L) 4.00 - 5.20 x10*6/uL    Hemoglobin 8.7 (L) 12.0 - 16.0 g/dL    Hematocrit 28.5 (L) 36.0 - 46.0 %    MCV 80 80 - 100 fL    MCH 24.4 (L) 26.0 - 34.0 pg    MCHC 30.5 (L) 32.0 - 36.0 g/dL    RDW 16.0 (H) 11.5 - 14.5 %    Platelets 361 150 - 450 x10*3/uL    Neutrophils % 82.3 40.0 - 80.0 %    Immature Granulocytes %, Automated 0.6 0.0 - 0.9 %    Lymphocytes % 10.8 13.0 - 44.0 %    Monocytes % 6.0 2.0 - 10.0 %    Eosinophils % 0.1 0.0 - 6.0 %    Basophils % 0.2 0.0 - 2.0 %    Neutrophils Absolute 10.69 (H) 1.20 - 7.70 x10*3/uL    Immature Granulocytes Absolute, Automated 0.08 0.00 - 0.70 x10*3/uL    Lymphocytes Absolute 1.40 1.20 - 4.80 x10*3/uL    Monocytes Absolute 0.78 0.10 - 1.00 x10*3/uL    Eosinophils Absolute 0.01 0.00 - 0.70 x10*3/uL    Basophils Absolute 0.03 0.00 - 0.10 x10*3/uL       Assessment/Plan   Assessment & Plan  Bariatric surgery status    PONV (postoperative nausea and vomiting)    Abdominal pain with vomiting and history of abdominal surgery    59F s/p diagnostic lap with revision of JJ anastomosis. Some episodes of post-operative hypotension but Hb somewhat stable. No other events.     Plan:  - Check orthostatics  - Bedrest  - Multimodal pain control  - Continue IV fluids  - Serial CBCs  - Possible echocardiogram         I spent 60 minutes in the professional and overall care of this patient.    Michael Camacho MD

## 2024-11-21 NOTE — PROGRESS NOTES
11/21/24 1454   Discharge Planning   Living Arrangements Spouse/significant other   Support Systems Spouse/significant other   Assistance Needed pt is independent with mobility and ADL's   Type of Residence Private residence   Number of Stairs to Enter Residence 2   Number of Stairs Within Residence 0   Do you have animals or pets at home? Yes   Type of Animals or Pets 2 dogs   Home or Post Acute Services None   Expected Discharge Disposition Home   Does the patient need discharge transport arranged? No   Housing Stability   In the last 12 months, was there a time when you were not able to pay the mortgage or rent on time? N   At any time in the past 12 months, were you homeless or living in a shelter (including now)? N   Transportation Needs   In the past 12 months, has lack of transportation kept you from medical appointments or from getting medications? no   In the past 12 months, has lack of transportation kept you from meetings, work, or from getting things needed for daily living? No   Intensity of Service   Intensity of Service 0-30 min     Sw met with pt in pts room to complete the discharge planning assessment. Pt is a/ox3 and is pleasant to work with. Pt was admitted with a dx of bariatric surgery. Sw educated pt on the role of the TCC and SW in the DC process.  Plan is for pt to return home with . There are no DC needs anticipated.  Pt resides in a ranch style house with 2 steps to enter and first floor set up. Pt was independent with mobility and ADL's prior to hospitalization. Pt was also working full time and driving. Pts  is able to provide transportation home on day of dc. Pt has 2 dogs.  Sw verified pts pharmacy. Pts pcp is Dr. Allan Tubbs.  Pt has had no recent falls in the home. There are no social service needs anticipated post dc at this time.

## 2024-11-21 NOTE — CARE PLAN
Problem: Pain - Adult  Goal: Verbalizes/displays adequate comfort level or baseline comfort level  11/20/2024 2303 by Renetta Rodriguez RN  Outcome: Progressing  11/20/2024 2303 by Renetta Rodriguez RN  Outcome: Progressing   The patient's goals for the shift include  comfort    The clinical goals for the shift include  comfort

## 2024-11-22 ENCOUNTER — PHARMACY VISIT (OUTPATIENT)
Dept: PHARMACY | Facility: CLINIC | Age: 59
End: 2024-11-22
Payer: COMMERCIAL

## 2024-11-22 VITALS
HEIGHT: 67 IN | RESPIRATION RATE: 16 BRPM | SYSTOLIC BLOOD PRESSURE: 101 MMHG | OXYGEN SATURATION: 98 % | WEIGHT: 190.04 LBS | HEART RATE: 62 BPM | DIASTOLIC BLOOD PRESSURE: 64 MMHG | TEMPERATURE: 98.6 F | BODY MASS INDEX: 29.83 KG/M2

## 2024-11-22 PROBLEM — R11.2 PONV (POSTOPERATIVE NAUSEA AND VOMITING): Status: RESOLVED | Noted: 2024-11-20 | Resolved: 2024-11-22

## 2024-11-22 PROBLEM — Z98.890 PONV (POSTOPERATIVE NAUSEA AND VOMITING): Status: RESOLVED | Noted: 2024-11-20 | Resolved: 2024-11-22

## 2024-11-22 LAB
ALBUMIN SERPL BCP-MCNC: 3.3 G/DL (ref 3.4–5)
ALP SERPL-CCNC: 67 U/L (ref 33–110)
ALT SERPL W P-5'-P-CCNC: 13 U/L (ref 7–45)
ANION GAP SERPL CALC-SCNC: 9 MMOL/L (ref 10–20)
AST SERPL W P-5'-P-CCNC: 27 U/L (ref 9–39)
BILIRUB SERPL-MCNC: 1.5 MG/DL (ref 0–1.2)
BUN SERPL-MCNC: 14 MG/DL (ref 6–23)
CALCIUM SERPL-MCNC: 8.8 MG/DL (ref 8.6–10.3)
CHLORIDE SERPL-SCNC: 100 MMOL/L (ref 98–107)
CO2 SERPL-SCNC: 28 MMOL/L (ref 21–32)
CREAT SERPL-MCNC: 0.76 MG/DL (ref 0.5–1.05)
EGFRCR SERPLBLD CKD-EPI 2021: >90 ML/MIN/1.73M*2
ERYTHROCYTE [DISTWIDTH] IN BLOOD BY AUTOMATED COUNT: 16.3 % (ref 11.5–14.5)
GLUCOSE SERPL-MCNC: 85 MG/DL (ref 74–99)
HCT VFR BLD AUTO: 27.4 % (ref 36–46)
HGB BLD-MCNC: 8.5 G/DL (ref 12–16)
MAGNESIUM SERPL-MCNC: 2.07 MG/DL (ref 1.6–2.4)
MCH RBC QN AUTO: 24.5 PG (ref 26–34)
MCHC RBC AUTO-ENTMCNC: 31 G/DL (ref 32–36)
MCV RBC AUTO: 79 FL (ref 80–100)
NRBC BLD-RTO: 0 /100 WBCS (ref 0–0)
PHOSPHATE SERPL-MCNC: 2.8 MG/DL (ref 2.5–4.9)
PLATELET # BLD AUTO: 326 X10*3/UL (ref 150–450)
POTASSIUM SERPL-SCNC: 4.2 MMOL/L (ref 3.5–5.3)
PROT SERPL-MCNC: 5.5 G/DL (ref 6.4–8.2)
RBC # BLD AUTO: 3.47 X10*6/UL (ref 4–5.2)
SODIUM SERPL-SCNC: 133 MMOL/L (ref 136–145)
WBC # BLD AUTO: 7.2 X10*3/UL (ref 4.4–11.3)

## 2024-11-22 PROCEDURE — 83735 ASSAY OF MAGNESIUM: CPT | Performed by: SURGERY

## 2024-11-22 PROCEDURE — 2500000004 HC RX 250 GENERAL PHARMACY W/ HCPCS (ALT 636 FOR OP/ED): Performed by: SURGERY

## 2024-11-22 PROCEDURE — 2500000004 HC RX 250 GENERAL PHARMACY W/ HCPCS (ALT 636 FOR OP/ED): Performed by: STUDENT IN AN ORGANIZED HEALTH CARE EDUCATION/TRAINING PROGRAM

## 2024-11-22 PROCEDURE — RXMED WILLOW AMBULATORY MEDICATION CHARGE

## 2024-11-22 PROCEDURE — 85027 COMPLETE CBC AUTOMATED: CPT | Performed by: SURGERY

## 2024-11-22 PROCEDURE — 84075 ASSAY ALKALINE PHOSPHATASE: CPT | Performed by: SURGERY

## 2024-11-22 PROCEDURE — 2500000001 HC RX 250 WO HCPCS SELF ADMINISTERED DRUGS (ALT 637 FOR MEDICARE OP): Performed by: STUDENT IN AN ORGANIZED HEALTH CARE EDUCATION/TRAINING PROGRAM

## 2024-11-22 PROCEDURE — 36415 COLL VENOUS BLD VENIPUNCTURE: CPT | Performed by: SURGERY

## 2024-11-22 PROCEDURE — 84100 ASSAY OF PHOSPHORUS: CPT | Performed by: SURGERY

## 2024-11-22 PROCEDURE — 2500000002 HC RX 250 W HCPCS SELF ADMINISTERED DRUGS (ALT 637 FOR MEDICARE OP, ALT 636 FOR OP/ED): Performed by: STUDENT IN AN ORGANIZED HEALTH CARE EDUCATION/TRAINING PROGRAM

## 2024-11-22 RX ORDER — ACETAMINOPHEN 325 MG/1
650 TABLET ORAL EVERY 6 HOURS PRN
Status: DISCONTINUED | OUTPATIENT
Start: 2024-11-22 | End: 2024-11-22 | Stop reason: HOSPADM

## 2024-11-22 RX ORDER — ACETAMINOPHEN 325 MG/1
650 TABLET ORAL EVERY 6 HOURS PRN
Qty: 30 TABLET | Refills: 0 | Status: SHIPPED | OUTPATIENT
Start: 2024-11-22

## 2024-11-22 RX ORDER — ONDANSETRON 4 MG/1
4 TABLET, ORALLY DISINTEGRATING ORAL EVERY 8 HOURS PRN
Qty: 20 TABLET | Refills: 0 | Status: SHIPPED | OUTPATIENT
Start: 2024-11-22

## 2024-11-22 RX ORDER — OXYCODONE HCL 5 MG/5 ML
5 SOLUTION, ORAL ORAL EVERY 6 HOURS PRN
Qty: 90 ML | Refills: 0 | Status: SHIPPED | OUTPATIENT
Start: 2024-11-22 | End: 2024-12-05 | Stop reason: ALTCHOICE

## 2024-11-22 ASSESSMENT — PAIN SCALES - GENERAL: PAINLEVEL_OUTOF10: 6

## 2024-11-22 NOTE — DISCHARGE SUMMARY
Discharge Diagnosis  Bariatric surgery status    Issues Requiring Follow-Up  Post op status    Test Results Pending At Discharge  Pending Labs       Order Current Status    Surgical Pathology Exam In process            Hospital Course   Patient underwent revision of her GJ anastomosis on 11/20/2024.  She did well on postoperative day 1 and began tolerating her diet.  Pain controlled.  Having bowel movements, passing flatus, and voiding without issue.  Minimal nausea.  Stable for discharge home on postop day 2.    Pertinent Physical Exam At Time of Discharge  Physical Exam  Physical Exam:   Constitutional: Well developed, awake/alert/oriented x3, no distress, alert and cooperative  Eyes: PERRL, EOMI, clear sclera  Head/Neck: Neck supple, no apparent injury, No JVD, trachea midline  Respiratory/Thorax: good chest expansion, thorax symmetric  Cardiovascular: Regular, rate and rhythm,  2+ equal pulses of the extremities  Gastrointestinal: Nondistended, soft, minimally tender, no rebound tenderness or guarding, no masses palpable, incisions c/d/i  Musculoskeletal: ROM intact, no joint swelling, normal strength  Extremities: normal extremities, no cyanosis edema, contusions or wounds, no clubbing  Neurological: alert and oriented x3, intact senses,  Psychological: Appropriate mood and behavior  Skin: Warm and dry, no lesions, no rashes      Home Medications     Medication List      START taking these medications     acetaminophen 325 mg tablet; Commonly known as: Tylenol; Take 2 tablets   (650 mg) by mouth every 6 hours if needed for mild pain (1 - 3).   ondansetron ODT 4 mg disintegrating tablet; Commonly known as:   Zofran-ODT; Dissolve 1 tablet (4 mg) by mouth every 8 hours if needed for   nausea.   oxyCODONE 5 mg/5 mL solution; Commonly known as: Roxicodone; Take 5 mL   (5 mg) by mouth every 6 hours if needed for moderate pain (4 - 6).     CONTINUE taking these medications     Children's Multivitamin tablet,chewable  chewable tablet; Generic drug:   pediatric multivitamin   CITRACAL REGULAR ORAL   cyanocobalamin 1,000 mcg/mL injection; Commonly known as: Vitamin B-12;   INJECT 1 ML INTO THE SHOULDER, THIGH, OR BUTTOCKS EVERY 30 DAYS   fexofenadine 180 mg tablet; Commonly known as: Allegra   levothyroxine 50 mcg tablet; Commonly known as: Synthroid, Levoxyl; Take   1 tablet (50 mcg) by mouth once daily.   omeprazole 40 mg DR capsule; Commonly known as: PriLOSEC; Take 1 capsule   (40 mg) by mouth once daily. Open Capsule, sprinkle over sugar free   applesauce or pudding - Do not crush or chew.   triamterene-hydrochlorothiazid 37.5-25 mg tablet; Commonly known as:   Maxzide-25; Take 1 tablet by mouth once daily.     STOP taking these medications     Ozempic 0.25 mg or 0.5 mg(2 mg/1.5 mL) pen injector; Generic drug:   semaglutide       Outpatient Follow-Up  Future Appointments   Date Time Provider Department [place]   12/5/2024 11:00 AM MD ORLANDO [id number] [place]   12/5/2024 11:15 AM ЕЛЕНА [id number] [place]   12/24/2024  3:00 PM ЕЛЕНА [id number] [place]   12/24/2024  4:00 PM ANTONIO [id number] [place]   2/17/2025 11:45 AM ANTONIO [id number] [place]   2/17/2025  1:00 PM ЕЛЕНА [id number] [place]       Chet Singh MD

## 2024-11-22 NOTE — DISCHARGE INSTRUCTIONS
PLEASE NOTE THE FOLLOWING CHANGES IN YOUR MEDICATION REGIMEN:    If you are diabetic:  - please check your blood sugar daily  - If your blood glucose is repeatedly over 200, start taking Metformin 500mg twice a day and contact your PCP to help guide your future management of your blood glucose  - If your blood glucose is less than 60 or you have symptoms of hypoglycemia (light headed, weak), take glucose or drink a cup of juice. If this happens repeatedly, stop all diabetes medication and contact your PCP or endocrinologist.    Medications:  - Medications should be crushed and mixed with full liquids. Capsules may be opened and mixed with full liquids.  - Extended release medications should not be taken. Please contact your primary care physician to convert extended release medications to immediate release form.   - Take 650mg Tylenol with the tylenol you can take tablets (plain white tablets and cut into smaller pieces or crush in applesauce) or Tylenol powder (new product, 500mg per packet, dissolves in the mouth and does not need water every 6 hours for pain. Take between doses of your opioid pain medication. Try to limit the use of your opioid pain medication and only take as needed.  - Slowly add your vitamins to your daily medication regimen as tolerated. You do not have to take them within the first few days after surgery if they are causing you nausea or other problems.   - If you have medications that you still cannot tolerate by your first visit with your surgeon or dietitian, please discuss this.   - You should be receiving or already have received the following medications for your postoperative course: a proton pump inhibitor for acid suppression (omeprazole, esomeprazole, pantoprazole), antinausea medication (ondansetron, metoclopramide), and pain medication (oxycodone, morphine, hydromorphone, hydrocodone). If you are missing any of these, please call the office immediately so we can prescribe them  for you.  - OPEN UP OMEPRAZOLE CAPSULES AND SPRINKLE THEM IN WATER PRIOR TO TAKING. IF YOU HAD BARIATRIC SURGERY, YOU WILL CONTINUE THIS MEDICATION FOR AT LEAST 6 MONTHS.      Constipation  - Take fiber (benefiber or metamucil) twice daily. Please also take colace (docusate) twice a day while taking oxycodone or other opiates. If you remain constipated despite these medications, please take milk of magnesia and call the office to notify us.      Activity instructions:   - No lifting, pulling, or pushing objects greater than 15 pounds for 4 weeks.  - Activity otherwise as tolerated.   - You may shower. Soap and water may run over your incisions. Pat dry. No submerging in baths or  swimming (activities that keep your incisions underwater) for at least two weeks.    - You may not drive while taking narcotics.     Diet:   - You will go home on a full liquid diet (similar to the diet you were on your final day in the hospital), continue this for 1 week  - Acceptable full liquids include protein shakes, sugar free jello, sugar free pudding, and creamy soups (no chunks). You will continue to drink clear liquids including water and crystal light. You should aim for 64 ounces of fluid per day, with about half of this being full liquids and half of this being clear liquids. Do not exceed 8 oz per hour.  - After 1 week, you can move to a pureed and soft diet.   - Follow a healthy bariatric diet. Target 5 meals per day (3 mid size meals and two small meals). Avoid concentrated sweets (candy, soda) and limit carbohydrate intake with a preference for healthy proteins (lean meats, beans    Call Provider If:  - Breathing faster or harder than normal.   - Fever of 100.4 F (38 C) or higher or feeling of chills.   - Feeling very sleepy and difficult to awaken.   - Inability to drink or significant decrease in ability to drink since discharge.   - Vomiting (throwing up) and not able to eat or drink for 12 hours.   - Urinating much less  than your normal.  - More than 4 loose, watery bowel movements in 24 hours (diarrhea).   - Any new concerning symptoms.

## 2024-11-22 NOTE — CARE PLAN
The patient's goals for the shift include comfort and safety.     The clinical goals for the shift include comfort      Problem: Pain - Adult  Goal: Verbalizes/displays adequate comfort level or baseline comfort level  Outcome: Progressing     Problem: Safety - Adult  Goal: Free from fall injury  Outcome: Progressing     Problem: Discharge Planning  Goal: Discharge to home or other facility with appropriate resources  Outcome: Progressing     Problem: Chronic Conditions and Co-morbidities  Goal: Patient's chronic conditions and co-morbidity symptoms are monitored and maintained or improved  Outcome: Progressing

## 2024-11-22 NOTE — PROGRESS NOTES
"Malathi Castellanos is a 59 y.o. female on day 2 of admission presenting with Bariatric surgery status.    Subjective   No acute events overnight.  Afebrile with stable hemodynamics, no hypotension or tachycardia.  She is voiding, passing flatus, and had 1 bowel movement.  Tolerating a puréed diet without issue. Normal orthostatics.       Objective     Physical Exam  Patient is in no acute distress  No respiratory distress  Chest is clear to auscultation  Abdomen is soft, not tender, not distended  No guarding   Incisions are clean dry and intact  Patient is alert and oriented x 3    Last Recorded Vitals  Blood pressure 101/64, pulse 62, temperature 37 °C (98.6 °F), temperature source Temporal, resp. rate 16, height 1.702 m (5' 7.01\"), weight 86.2 kg (190 lb 0.6 oz), SpO2 98%.    Intake/Output last 3 Shifts:  I/O last 3 completed shifts:  In: 4467.2 (51.8 mL/kg) [I.V.:1135 (13.2 mL/kg); Blood:250; IV Piggyback:3082.2]  Out: 800 (9.3 mL/kg) [Urine:800 (0.3 mL/kg/hr)]  Weight: 86.2 kg     Relevant Results         No results found for this or any previous visit (from the past 24 hours).      Assessment/Plan   Assessment & Plan  Bariatric surgery status    PONV (postoperative nausea and vomiting)    Abdominal pain with vomiting and history of abdominal surgery    59F s/p diagnostic lap with revision of JJ anastomosis. Some episodes of post-operative hypotension but Hb somewhat stable. No other events.     Plan:    - Continue pureed diet  - Multimodal pain control  - Continue IV fluids while inpatient  - Check AM labs  - Anticipate discharge home later today         I spent 60 minutes in the professional and overall care of this patient.    Chet Singh MD    "

## 2024-11-25 ENCOUNTER — PATIENT OUTREACH (OUTPATIENT)
Dept: CARE COORDINATION | Facility: CLINIC | Age: 59
End: 2024-11-25
Payer: COMMERCIAL

## 2024-11-25 ENCOUNTER — TELEPHONE (OUTPATIENT)
Dept: SURGERY | Facility: CLINIC | Age: 59
End: 2024-11-25
Payer: COMMERCIAL

## 2024-11-25 NOTE — PROGRESS NOTES
Medications  Medications reviewed with patient/caregiver?: Yes (11/25/2024 10:38 AM)  Does the patient have all medications ordered at discharge?: Yes (11/25/2024 10:38 AM)  Care Management Interventions: No intervention needed (11/25/2024 10:38 AM)  Is the patient taking all medications as directed (includes completed medication regime)?: Yes (11/25/2024 10:38 AM)    Appointments  Does the patient have a primary care provider?: Yes (11/25/2024 10:38 AM)  Care Management Interventions: Advised patient to make appointment (11/25/2024 10:38 AM)  Has the patient kept scheduled appointments due by today?: Not applicable (11/25/2024 10:38 AM)    Patient Teaching  Does the patient have access to their discharge instructions?: Yes (11/25/2024 10:38 AM)  Care Management Interventions: Reviewed instructions with patient (11/25/2024 10:38 AM)  What is the patient's perception of their health status since discharge?: Improving (11/25/2024 10:38 AM)    Wrap Up  Wrap Up Additional Comments: Malathi reports she is doing well, pain is manageable, ambulatory and independent with ADLs, has good supports.  Has follow up appts scheduled.  She is maintaining a full liq diet until Thursday and will advance to soft at that time. (11/25/2024 10:38 AM)      Kait Robbins RN [facility]  [phone]

## 2024-11-27 LAB
ATRIAL RATE: 70 BPM
P AXIS: 47 DEGREES
P OFFSET: 183 MS
P ONSET: 130 MS
PR INTERVAL: 178 MS
Q ONSET: 219 MS
QRS COUNT: 12 BEATS
QRS DURATION: 78 MS
QT INTERVAL: 408 MS
QTC CALCULATION(BAZETT): 440 MS
QTC FREDERICIA: 429 MS
R AXIS: -8 DEGREES
T AXIS: 16 DEGREES
T OFFSET: 423 MS
VENTRICULAR RATE: 70 BPM

## 2024-11-27 NOTE — PROGRESS NOTES
"BARIATRIC SURGERY CLINIC  FOLLOW UP NOTE      Name: Malathi Castellanos  MRN: [id number]      Index Surgery  Date of Surgery: 11/20/2024   Surgeon: Joy    Surgical Procedure: Revision: Yris en Y gastric bypass revision  20447    HPI:   Presenting for follow up visit. S/p LAPAROSCOPIC EXPLORATION/ LYSIS OF ADHESIONS/ SMALL BOWEL RESECTION WITH ANASTOMOSIS (REVISION OF JEJUNOSTOMY) EGD/ TAPBLOCK on 11/20/24.    Diet Full liquid    Exercise walking    Concerns related to:  Nausea/Vomiting, Reflux: denies  Abdominal Pain: denies \"sore\"  Diarrhea/Constipation: Denies, having regular BM's    DAILY SUPPLEMENTS:  Calcium: Calcium Citrate w/ vitamin D (1200 - 1500mg)  Multivitamin & Minerals: 2 per day  Vitamin B12: 500 mcg/day   Vitamin D3: 3000 units  PPI:omeprazole      Current Outpatient Medications   Medication Sig Dispense Refill    acetaminophen (Tylenol) 325 mg tablet Take 2 tablets (650 mg) by mouth every 6 hours if needed for mild pain (1 - 3). 30 tablet 0    calcium citrate/vitamin D3 (CITRACAL REGULAR ORAL) Take 1 tablet by mouth once daily.      cyanocobalamin (Vitamin B-12) 1,000 mcg/mL injection INJECT 1 ML INTO THE SHOULDER, THIGH, OR BUTTOCKS EVERY 30 DAYS 10 mL 0    fexofenadine (Allegra) 180 mg tablet Take 1 tablet (180 mg) by mouth once daily.      levothyroxine (Synthroid, Levoxyl) 50 mcg tablet Take 1 tablet (50 mcg) by mouth once daily. 90 tablet 3    omeprazole (PriLOSEC) 40 mg DR capsule Take 1 capsule (40 mg) by mouth once daily. Open Capsule, sprinkle over sugar free applesauce or pudding - Do not crush or chew. 30 capsule 2    ondansetron ODT (Zofran-ODT) 4 mg disintegrating tablet Dissolve 1 tablet (4 mg) by mouth every 8 hours if needed for nausea. 20 tablet 0    oxyCODONE (Roxicodone) 5 mg immediate release tablet Take 1 tablet (5 mg) by mouth every 6 hours if needed for severe pain (7 - 10) for up to 7 days. 10 tablet 0    oxyCODONE (Roxicodone) 5 mg/5 mL solution Take 5 mL (5 mg) by mouth every 6 " hours if needed for moderate pain (4 - 6). 90 mL 0    pediatric multivitamin (Children's Multivitamin) tablet,chewable chewable tablet Chew 1 tablet 2 times a day.      triamterene-hydrochlorothiazid (Maxzide-25) 37.5-25 mg tablet Take 1 tablet by mouth once daily. 90 tablet 3     No current facility-administered medications for this visit.       Comorbidities:  Patient Active Problem List   Diagnosis    Absence of breast, acquired    Acquired absence of both nipples    Acute otitis media with perforation, left    Acute serous otitis media of right ear without rupture    Central perforation of tympanic membrane    Chronic sinusitis    Acquired hypothyroidism    Lipoma    Lung granuloma (Multi)    Pulmonary nodules    Menieres disease    Mixed hearing loss of left ear    Nasal obstruction    Nasal polyps    S/P gastric bypass    Sensorineural hearing loss of right ear    Vitamin B12 deficiency    Overweight with body mass index (BMI) of 28 to 28.9 in adult    Anemia    Otalgia, right    Bariatric surgery status    Abdominal pain with vomiting and history of abdominal surgery         REVIEW OF SYSTEMS:  CONSTITUTIONAL: Patient denies fevers, chills, sweats and weight changes.  CARDIOVASCULAR: Patient denies chest pains, palpitations, orthopnea and paroxysmal nocturnal dyspnea.  RESPIRATORY: No dyspnea on exertion, no wheezing or cough.  GI: No nausea, vomiting, diarrhea, constipation, abdominal pain, hematochezia or melena.  MUSCULOSKELETAL: No myalgias or arthralgias.  NEUROLOGIC: No chronic headaches, no seizures. Patient denies numbness, tingling or weakness.  PSYCHIATRIC: Patient denies problems with mood disturbance. No problems with anxiety.  ENDOCRINE: No excessive urination or excessive thirst.  DERMATOLOGIC: Patient denies any rashes or skin changes.    PHYSICAL EXAM:  There were no vitals taken for this visit.  Alert, well appearing, no acute distress, nourished, hydrated.  Anicteric sclera, no  ptosis  Facial symmetry  Neck supple  Unlabored respirations.  Easily conversant without increased respiratory effort  Oriented to person, place, time.  Judgement intact.  Appropriate mood, affect.   Abdomen soft, nondistended, nontender, incisions are healing well  No peripheral edema    ASSESSMENT & PLAN:  59 y.o. female presenting for follow up visit s/p Revision of Gastric Bypass    No acute post bariatric surgery complications  No acute issues or concerns presented today.  Tolerating diet with appropriate protein and fluid intake  Taking appropriate supplements  Bowel regularity yes  Exercise walking  Weight Loss: Initial  -> Current   Wt Readings from Last 1 Encounters:   11/22/24 86.2 kg (190 lb 0.6 oz)       Plan:  Advance to solid food as tolerated  Advance activity as tolerated    -Continue to follow protein forward, reduced calorie, whole foods based diet, follow diet direction of bariatric RD  -Continue to participate in regular exercise with goal to achieve 200-300 minutes per week of aerobic & resistance training for preservation of lean body mass.  -Continue multivitamin and supplements to support micronutrient needs  -Ongoing need for anti reflux medications discussed and continued if appropriate - see orders.  -Bowel hygiene reviewed, encouraged adequate fluids, increased dietary fiber and reviewed as needed use of over the counter treatments to promote bowel support.   -Labs - see orders      Follow up 4 weeks

## 2024-12-02 NOTE — PROGRESS NOTES
Surgery Date: 2004   Surgeon:  Guillermo  Procedure:  gastric bypass    Surgery date: 11/20/24  Surgeon: Joy  Procedure:   revision of jejunostomy     ASSESSMENT:  Current weight pounds:  170.5  Ht: 67.0   in.        BMI: 26.7  Previous weight pounds:   182.3   11/1/24   Initial start weight pounds:    290.0    EBW pounds:  131.0   Total weight change pounds: 119.5  %EBW Lost: 91.2%    PROGRESS:  Nutrition Interventions for last encounter (date):   1. Drink 64 oz of fluid daily  2. Drink enough protein shakes to meet your goal of 60-70 g of protein per day  3. Take 2 chewable multivitamins, 4394-7262 mg of calcium citrate, and 500 mcg of B12 daily  4. Get up and walk frequently throughout the day.     CHANGES IN TREATMENT:   Patient met goals:     Partially   24 hour food recall: Pt on pureed foods  B: 1/2 protein lizbeth w/coffee  L;  pureed soup   D:  [employer]  Beverages:   Isopure 32 g pro, 64 oz water, 1 protein shake 30 g   Alcohol:  none    Vitamins:   gummy MVI, calcium citrate 500 mg, B12 IM   Physical Activity: walking     READINESS TO LEARN:  Motivation to learn:           Interested      Understanding of instruction: Good   Anticipated Compliance:   Good     Family Support: Unable to assess-family not present     Patient presents with post-op weight loss surgery gastric bypass. The pt is 2 weeks post op revision of  jejunostomy.   Patient tolerating a pureed diet.  Protein intake is adequate for post-op individual. Fluid consumption is adequate. Patient is not supplementing recommended vitamin/minerals. Advised to switch to Equate or Target children's chewable tablet instead of gummies.  Advisd to take 6079-6594 mg calcium citrate daily.   Pt states no concerns and/or difficulties.   Reviewed  soft food diet to start today.  Reminded pt. to eat slowly, chew thoroughly and to try one new food at a time    Malnutrition Screening  Significant unintentional weight loss? n/a  Eating less than 75% of usual  intake for more than 2 weeks? n/a      Nutrition Diagnosis:   1. Increased protein and nutrition needs related to altered GI function as evidenced by pt. s/p gastric bypass.  2. Food- and nutrition-related knowledge deficit related to lack of prior exposure to surgical weight loss information as evidenced by diet recall.     Nutrition Interventions:   1. Modify type and amount of food and nutrients within meals and snacks.  2. Comprehensive Nutrition Education  -Nutrition education materials: Support Group Schedule       Recommendations:    1. Continue to drink your protein shakes to meet your goal of 60-70 g of protein per day.   2. Continue to drink 64 oz. of zero calorie beverages per day  3. Follow no  drinking 30 min before, during the meal and for 30 minutes after the meal   4. Continue to exercise   5. Advance to soft food. !f you do not tolerate the soft diet, go back to the puree diet for a few more days and then try puree again.    6. Continue to take all of your vitamins and minerals. When you finish your gummy multivitamin switch to  Equate or Target children's chewable tablets and take 1 2x/day with food. Be sure to take  9258-0328 mg of calcium citrate daily.  Take in in divided doses of no more than 500-600 mg at a time 2 hours apart from each other and from your multivitamin      Nutrition Monitoring and Evaluation:   1-2 pounds weight loss per week  Criteria: weight check, food recall  Need for Follow-up: 2 weeks post op       Cristal LAURENT, [facility]  Bariatric Surgery Dietitian  Phone: [phone]  Fax: [phone]

## 2024-12-03 DIAGNOSIS — G89.18 POST-OP PAIN: Primary | ICD-10-CM

## 2024-12-03 RX ORDER — OXYCODONE HYDROCHLORIDE 5 MG/1
5 TABLET ORAL EVERY 6 HOURS PRN
Qty: 10 TABLET | Refills: 0 | Status: SHIPPED | OUTPATIENT
Start: 2024-12-03 | End: 2024-12-05 | Stop reason: ALTCHOICE

## 2024-12-03 NOTE — DOCUMENTATION CLARIFICATION NOTE
"    PATIENT:               ANMOL NUNEZ  ACCT #:                  [id number]  MRN:                       [id number]  :                       1965  ADMIT DATE:       2024 1:21 PM  DISCH DATE:        2024 12:15 PM  RESPONDING PROVIDER #:        [id number]          PROVIDER RESPONSE TEXT:    Intussusception diagnosis is related to or is a complication of the history of Yris-en-Y gastric bypass procedure    CDI QUERY TEXT:    Clarification        Instruction:    Based on your assessment of the patient and the clinical information, please provide the requested documentation by clicking on the appropriate radio button and enter any additional information if prompted.    Question: Please further clarify if a relationship exists between the Intussusception  and history of Yris-en-Y gastric bypass    When answering this query, please exercise your independent professional judgment. The fact that a question is being asked, does not imply that any particular answer is desired or expected.    The patient's clinical indicators include:  Clinical Information: 24 H/P Dr. Hamilton \" history of RNY GBP approximately 20 years ago with complaints of dull LUQ pain for about the last year or so. Patient presenting today as a follow up to review results of repeat CT abdomen & pelvis with p.o. and IV contrast to rule out persistent intussusception.\"    Clinical Indicators:  1. 24 Op Note \"59-year-old female with history of gastric bypass in the past who has been having abdominal pain symptoms and was being evaluated by GI underwent endoscopy and imaging studies which revealed intussusception at the jejunojejunostomy.\"  2.  24 CT Abdomen/Pelvis impression \"Nonspecific mild circumferential wall thickening again demonstrated  at the Yris limb distal anastomosis of small bowel with short segment intussusception at the anastomosis level.\"      Treatment: Laparoscopic exploration/Lysis of adhesions/Small bowel " "resection with anastomosis, revision of jejunostomy.    Risk Factors:  \"history of RNY GBP approximately 20 years ago with complaints of dull LUQ pain for about the last year or so. Patient presenting today as a follow up to review results of repeat CT abdomen & pelvis with p.o. and IV contrast to rule out persistent intussusception.\"  Options provided:  -- Intussusception diagnosis is related to or is a complication of the history of Yris-en-Y gastric bypass procedure  -- Intussusception diagnosis is unrelated to history Yris-en-Y gastric bypass procedure.  -- Other - I will add my own diagnosis  -- Refer to Clinical Documentation Reviewer    Query created by: Adelso Mason on 12/2/2024 1:38 PM      Electronically signed by:  BALDEV RATLIFF MD 12/3/2024 9:01 AM          "

## 2024-12-04 LAB
LABORATORY COMMENT REPORT: NORMAL
PATH REPORT.FINAL DX SPEC: NORMAL
PATH REPORT.GROSS SPEC: NORMAL
PATH REPORT.TOTAL CANCER: NORMAL
RESIDENT REVIEW: NORMAL

## 2024-12-05 ENCOUNTER — NUTRITION (OUTPATIENT)
Dept: SURGERY | Facility: CLINIC | Age: 59
End: 2024-12-05
Payer: COMMERCIAL

## 2024-12-05 ENCOUNTER — OFFICE VISIT (OUTPATIENT)
Dept: SURGERY | Facility: CLINIC | Age: 59
End: 2024-12-05
Payer: COMMERCIAL

## 2024-12-05 VITALS
HEART RATE: 84 BPM | TEMPERATURE: 97.8 F | BODY MASS INDEX: 26.76 KG/M2 | OXYGEN SATURATION: 100 % | HEIGHT: 67 IN | WEIGHT: 170.5 LBS | DIASTOLIC BLOOD PRESSURE: 86 MMHG | SYSTOLIC BLOOD PRESSURE: 136 MMHG

## 2024-12-05 DIAGNOSIS — K56.1 INTUSSUSCEPTION (MULTI): Primary | ICD-10-CM

## 2024-12-05 DIAGNOSIS — Z98.84 S/P GASTRIC BYPASS: ICD-10-CM

## 2024-12-05 PROCEDURE — 3008F BODY MASS INDEX DOCD: CPT | Performed by: SURGERY

## 2024-12-05 PROCEDURE — 99211 OFF/OP EST MAY X REQ PHY/QHP: CPT | Performed by: SURGERY

## 2024-12-05 ASSESSMENT — PAIN SCALES - GENERAL: PAINLEVEL_OUTOF10: 0-NO PAIN

## 2024-12-09 ENCOUNTER — PATIENT OUTREACH (OUTPATIENT)
Dept: CARE COORDINATION | Facility: CLINIC | Age: 59
End: 2024-12-09
Payer: COMMERCIAL

## 2024-12-09 NOTE — PROGRESS NOTES
[facility] DURGA follow up:  Call placed and VMM left.  Malathi has seen her surgeon and dietitian post op.    Kait Robbins, JAMIA [facility]  [phone]

## 2024-12-17 ENCOUNTER — APPOINTMENT (OUTPATIENT)
Dept: PRIMARY CARE | Facility: CLINIC | Age: 59
End: 2024-12-17
Payer: COMMERCIAL

## 2024-12-24 ENCOUNTER — APPOINTMENT (OUTPATIENT)
Dept: SURGERY | Facility: CLINIC | Age: 59
End: 2024-12-24
Payer: COMMERCIAL

## 2024-12-24 ENCOUNTER — PATIENT OUTREACH (OUTPATIENT)
Dept: CARE COORDINATION | Facility: CLINIC | Age: 59
End: 2024-12-24
Payer: COMMERCIAL

## 2024-12-24 NOTE — PROGRESS NOTES
[facility] DURGA follow up:  Malathi reports she is doing well, eating and drinking fine, eliminating fine.  No issues at this point.  Will close the DURGA program    Kait Robbins RN [facility]  [phone]

## 2024-12-30 ENCOUNTER — APPOINTMENT (OUTPATIENT)
Dept: SURGERY | Facility: CLINIC | Age: 59
End: 2024-12-30
Payer: COMMERCIAL

## 2025-01-13 ENCOUNTER — APPOINTMENT (OUTPATIENT)
Dept: SURGERY | Facility: CLINIC | Age: 60
End: 2025-01-13
Payer: COMMERCIAL

## 2025-01-14 ENCOUNTER — APPOINTMENT (OUTPATIENT)
Dept: PRIMARY CARE | Facility: CLINIC | Age: 60
End: 2025-01-14
Payer: COMMERCIAL

## 2025-01-27 PROCEDURE — RXMED WILLOW AMBULATORY MEDICATION CHARGE

## 2025-01-31 ENCOUNTER — PHARMACY VISIT (OUTPATIENT)
Dept: PHARMACY | Facility: CLINIC | Age: 60
End: 2025-01-31
Payer: COMMERCIAL

## 2025-02-17 ENCOUNTER — APPOINTMENT (OUTPATIENT)
Dept: SURGERY | Facility: CLINIC | Age: 60
End: 2025-02-17
Payer: COMMERCIAL

## 2025-02-24 ENCOUNTER — TELEPHONE (OUTPATIENT)
Dept: PRIMARY CARE | Facility: CLINIC | Age: 60
End: 2025-02-24
Payer: COMMERCIAL

## 2025-02-24 DIAGNOSIS — R05.1 ACUTE COUGH: Primary | ICD-10-CM

## 2025-02-24 RX ORDER — BENZONATATE 200 MG/1
200 CAPSULE ORAL 3 TIMES DAILY PRN
Qty: 30 CAPSULE | Refills: 0 | Status: SHIPPED | OUTPATIENT
Start: 2025-02-24 | End: 2025-03-06

## 2025-02-24 NOTE — TELEPHONE ENCOUNTER
Patient called in requesting tessalon pearls. She stated she just got back from the [place] and has had a cough for a few days. She is requesting a call back  Syd Hernández  Please Advise

## 2025-03-05 ENCOUNTER — TELEPHONE (OUTPATIENT)
Dept: PRIMARY CARE | Facility: CLINIC | Age: 60
End: 2025-03-05

## 2025-03-05 NOTE — TELEPHONE ENCOUNTER
Patient called in stating she was being prescribed ozempic and was seeing Dr. Tubbs for this. She stated she had to have surgery for a bowel obstruction and is now fully healed. Patient is wondering if she can continue the ozempic? She is also wondering if she is needing an appointment? She declined scheduling at this time and is wanting to check with Dr. Tubbs first  Please Advise

## 2025-03-09 PROBLEM — K91.2 POSTOPERATIVE MALABSORPTION (HHS-HCC): Status: ACTIVE | Noted: 2025-03-09

## 2025-03-09 NOTE — PROGRESS NOTES
BARIATRIC SURGERY CLINIC  Comprehensive Weight Management        Name: Malathi Castellanos  MRN: [id number]    Index Surgery  Date of Surgery: 11/20/2024   Surgeon: Joy    Surgical Procedure: Revision: Yris en Y gastric bypass revision  [id number]    Virtual or Telephone Consent:  An interactive audio and video telecommunication system which permits real time communications between the patient (at the originating site) and provider (at the distant site) was utilized to provide this telehealth service. Verbal consent was requested and obtained from Malathi Castellanos on this date, 03/11/25 for a telehealth visit and the patient's location was confirmed at the time of the visit.    HPI   Malathi Castellanos is a 59 y.o. female presenting for follow up s/p ex-lap with NARCISO, Small bowel resection w/ revision jejunostomy 11/20/2024.     Diet:  - Stage: regular food diet  - Achieving protein and fluid goals most days: yes     Exercise:  -  walking    Concerns related to:  Nausea/Vomiting, Reflux: denies  Abdominal Pain: denies  Diarrhea/Constipation- bowel function is regular    Daily Supplements:  Calcium: Calcium Citrate w/ vitamin D (1200 - 1500mg)  Multivitamin & Minerals: 2 per day  Vitamin B12: 500 mcg/day   Vitamin D3: 3000 units   Iron/Other: iron supplement   PPI: taking    Primary Medical Hx:  Patient Active Problem List   Diagnosis    Absence of breast, acquired    Acquired absence of both nipples    Acute otitis media with perforation, left    Acute serous otitis media of right ear without rupture    Central perforation of tympanic membrane    Chronic sinusitis    Acquired hypothyroidism    Lipoma    Lung granuloma (Multi)    Pulmonary nodules    Menieres disease    Mixed hearing loss of left ear    Nasal obstruction    Nasal polyps    S/P gastric bypass    Sensorineural hearing loss of right ear    Vitamin B12 deficiency    Overweight with body mass index (BMI) of 28 to 28.9 in adult    Anemia    Otalgia, right    Bariatric surgery  status    Abdominal pain with vomiting and history of abdominal surgery    Postoperative malabsorption (HHS-HCC)      Past Surgical History:   Procedure Laterality Date    BELT ABDOMINOPLASTY  11/04/2014    Abdominoplasty    CHOLECYSTECTOMY      CT GUIDED PERCUTANEOUS BIOPSY LUNG  01/06/2020    CT GUIDED PERCUTANEOUS BIOPSY LUNG 1/6/2020 [facility]    GASTRIC BYPASS  11/04/2014    Gastric Surgery For Morbid Obesity Gastric Bypass    HERNIA REPAIR  11/04/2014    Hernia Repair    HYSTERECTOMY      LYMPH NODE BIOPSY      MASTECTOMY  11/04/2014    Breast Surgery Mastectomy    OTHER SURGICAL HISTORY  11/04/2014    Breast Reconstruction With Implant Prosthesis Bilateral    OTHER SURGICAL HISTORY  11/04/2014    Breast Reconstruction With Tissue Expander Bilateral    OTHER SURGICAL HISTORY  12/05/2022    Cardiac catheterization    OTHER SURGICAL HISTORY  03/19/2019    Eye surgery    OTHER SURGICAL HISTORY  03/18/2019    Ear pressure equalization tube insertion    OTHER SURGICAL HISTORY  03/18/2019    Yris-en-Y gastric bypass    OTHER SURGICAL HISTORY  03/18/2019    Hand surgery    OTHER SURGICAL HISTORY  03/18/2019    Hysterectomy    OTHER SURGICAL HISTORY  04/15/2015    Breast Surgery Revision Of Reconstructed Left Breast    SINUS SURGERY      TONSILLECTOMY      TUBAL LIGATION  11/04/2014    Tubal Ligation      Social History     Socioeconomic History    Marital status:      Spouse name: Not on file    Number of children: Not on file    Years of education: Not on file    Highest education level: Not on file   Occupational History    Not on file   Tobacco Use    Smoking status: Former     Types: Cigarettes    Smokeless tobacco: Never   Vaping Use    Vaping status: Never Used   Substance and Sexual Activity    Alcohol use: Not Currently     Comment: social    Drug use: Never    Sexual activity: Not Currently   Other Topics Concern    Not on file   Social History Narrative    Not on file     Social Drivers of  Health     Financial Resource Strain: Low Risk  (11/20/2024)    Overall Financial Resource Strain (CARDIA)     Difficulty of Paying Living Expenses: Not hard at all   Food Insecurity: No Food Insecurity (11/20/2024)    Hunger Vital Sign     Worried About Running Out of Food in the Last Year: Never true     Ran Out of Food in the Last Year: Never true   Transportation Needs: No Transportation Needs (11/21/2024)    PRAPARE - Transportation     Lack of Transportation (Medical): No     Lack of Transportation (Non-Medical): No   Physical Activity: Sufficiently Active (11/20/2024)    Exercise Vital Sign     Days of Exercise per Week: 7 days     Minutes of Exercise per Session: 30 min   Stress: No Stress Concern Present (11/20/2024)    [employer] [place] [employer] - Occupational Stress Questionnaire     Feeling of Stress : Not at all   Social Connections: Moderately Isolated (11/20/2024)    Social Connection and Isolation Panel [NHANES]     Frequency of Communication with Friends and Family: More than three times a week     Frequency of Social Gatherings with Friends and Family: Once a week     Attends [employer] Services: Never     Active Member of Clubs or Organizations: No     Attends Club or Organization Meetings: Never     Marital Status:    Intimate Partner Violence: Not At Risk (11/20/2024)    Humiliation, Afraid, Rape, and Kick questionnaire     Fear of Current or Ex-Partner: No     Emotionally Abused: No     Physically Abused: No     Sexually Abused: No   Housing Stability: Low Risk  (11/21/2024)    Housing Stability Vital Sign     Unable to Pay for Housing in the Last Year: No     Number of Times Moved in the Last Year: 1     Homeless in the Last Year: No     Family History   Problem Relation Name Age of Onset    Prostate cancer Father      Heart attack Maternal Grandmother      Stroke Maternal Grandfather Frandy     Cancer Paternal Grandfather Steven       Current Outpatient Medications on File  "Prior to Visit   Medication Sig Dispense Refill    [] benzonatate (Tessalon) 200 mg capsule Take 1 capsule (200 mg) by mouth 3 times a day as needed for cough for up to 10 days. Do not crush or chew. 30 capsule 0    cyanocobalamin (Vitamin B-12) 1,000 mcg/mL injection INJECT 1 ML INTO THE SHOULDER, THIGH, OR BUTTOCKS EVERY 30 DAYS 10 mL 0    fexofenadine (Allegra) 180 mg tablet Take 1 tablet (180 mg) by mouth once daily.      levothyroxine (Synthroid, Levoxyl) 50 mcg tablet Take 1 tablet (50 mcg) by mouth once daily. 90 tablet 3    omeprazole (PriLOSEC) 40 mg DR capsule Take 1 capsule (40 mg) by mouth once daily. Open Capsule, sprinkle over sugar free applesauce or pudding - Do not crush or chew. 30 capsule 2    triamterene-hydrochlorothiazid (Maxzide-25) 37.5-25 mg tablet Take 1 tablet by mouth once daily. 90 tablet 3    [DISCONTINUED] acetaminophen (Tylenol) 325 mg tablet Take 2 tablets (650 mg) by mouth every 6 hours if needed for mild pain (1 - 3). 30 tablet 0    [DISCONTINUED] calcium citrate/vitamin D3 (CITRACAL REGULAR ORAL) Take 1 tablet by mouth once daily.      [DISCONTINUED] ondansetron ODT (Zofran-ODT) 4 mg disintegrating tablet Dissolve 1 tablet (4 mg) by mouth every 8 hours if needed for nausea. (Patient not taking: Reported on 2024) 20 tablet 0    [DISCONTINUED] pediatric multivitamin (Children's Multivitamin) tablet,chewable chewable tablet Chew 1 tablet 2 times a day.       No current facility-administered medications on file prior to visit.      Allergies   Allergen Reactions    Prednisone Shortness of breath    Promethazine Hives    Codeine Diarrhea and Other     Stomach Burning    Hydrocodone-Acetaminophen Other     \"Stomach burns\"    Ibuprofen Other     Cannot take due to gastric bypass    Moxifloxacin Hives and Unknown     Pt states it caused cell disruption    Nsaids (Non-Steroidal Anti-Inflammatory Drug) Other     Cannot take due to gastric bypass      Vancomycin Rash     the " "patient reports that she had \"red man syndrome\" when taking Vancomycin        REVIEW OF SYSTEMS:  Negative unless otherwise reviewed in HPI.    PHYSICAL EXAM   Physical Exam   Ht: 5'7\"  Wt: 172 lbs  BMI: 27.0  Alert, well appearing, no acute distress, nourished, hydrated.  Anicteric sclera, no ptosis  Facial symmetry  Neck supple  Unlabored respirations.  Easily conversant without increased respiratory effort  Oriented to person, place, time.  Judgement intact.  Appropriate mood, affect.     ASSESSMENT & PLAN  59 y.o. female presenting for bariatric follow up s/p revision 11/2024. Doing well today just over 3 mo pov. No acute issues or concerns. Will get labs for evaluation of micronutrients. She wants to resume AOM phentermine which she last took 08/2024 prior to surgery for polyphagia. Reviewed medication risks/benefits, contraindications and expected outcomes with use. She will monitor HR and BP. CSA reviewed and signed. OARRS reviewed. Complete labs and follow up in 4 weeks.     Weight Impression:  -Today's Weight: 172 lbs    Problem List Items Addressed This Visit       S/P gastric bypass     Date of Surgery: 11/20/2024   S/p LAPAROSCOPIC EXPLORATION/ LYSIS OF ADHESIONS/ SMALL BOWEL RESECTION WITH ANASTOMOSIS (REVISION OF JEJUNOSTOMY) EGD/ TAPBLOCK on 11/20/24.    Patient is doing well   no acute issues   takes supplements   does regular exercise  no pain or GERD   Continue ppi    Recs:     doing well  No acute issues   advised to continue Diet, exercise,  portion control   visual cues for portion control   Regular FU with dietitian   continue life long vitamin supplementation   support groups  will check labs           Relevant Orders    CBC and Auto Differential    Comprehensive Metabolic Panel    Overweight with body mass index (BMI) of 28 to 28.9 in adult     BMI 27  Resume AOM phentermine for 3 months following todays visit.   CSA signed/reviewed. OARRS reviewed.  Follow up 4 weeks           Relevant " Medications    phentermine (Adipex-P) 37.5 mg tablet    Postoperative malabsorption (HHS-HCC) - Primary     Check micronutrient levels - see orders  Adjust micronutrient supplementation per results  Continue recommended post bariatric surgery supplements            Relevant Orders    Ferritin    Folate    Iron and TIBC    Vitamin B12    Vitamin D 25-Hydroxy,Total (for eval of Vitamin D levels)   Please see Patient Instructions for today's relevant referrals, orders and instructions.  All documented preventative counseling occurred face to face for duration of 15 minutes.     Follow Up: Follow up in about 4 weeks (around 4/8/2025).     Char Zafar, APRN-CNP

## 2025-03-09 NOTE — ASSESSMENT & PLAN NOTE
Date of Surgery: 11/20/2024   S/p LAPAROSCOPIC EXPLORATION/ LYSIS OF ADHESIONS/ SMALL BOWEL RESECTION WITH ANASTOMOSIS (REVISION OF JEJUNOSTOMY) EGD/ TAPBLOCK on 11/20/24.    Patient is doing well   no acute issues   takes supplements   does regular exercise  no pain or GERD   Continue ppi    Recs:     doing well  No acute issues   advised to continue Diet, exercise,  portion control   visual cues for portion control   Regular FU with dietitian   continue life long vitamin supplementation   support groups  will check labs

## 2025-03-09 NOTE — ASSESSMENT & PLAN NOTE
Check micronutrient levels - see orders  Adjust micronutrient supplementation per results  Continue recommended post bariatric surgery supplements

## 2025-03-09 NOTE — PATIENT INSTRUCTIONS
"The following are the recommendations we discussed at your appointment today:  1. Nutrition  - Please make sure you are seeing the bariatric dietitian regularly.    Dietitian Information:   Sheryl Bee: [phone]  [facility] [place] [phone]    Your Protein Goals will gradually increase as you get further out from surgery:  0-3 months - 60 GRAMS PER DAY  3-6 months - 60-75 GRAMS PER DAY  6-12 months - 75-90 GRAMS PER DAY  12+ months - 80-90 GRAMS PER DAY    - Follow Pouch Rules;.   Stop drinking 30 minutes before your meals, Take 30 minutes to eat your meal   - NO DRINKING DURING MEALS, Wait for 30 minutes after your meal to drink  - Eat 5 servings of fruits and veggies daily.  A serving is 1 small (tennis ball size) piece of whole fruit, 1/2 cup fresh fruit, 1 cup non starchy vegetables  - Eat 3 small meals and 1-2 healthy, protein rich, snacks per day.    EAT PROTEIN FIRST AT MEALS, 2nd non starchy vegetable or fruit serving, consume starches last and aim for whole grains of small portion.  This pattern of eating ensures you are getting full on high quality protein and higher fiber foods with many vitamins and minerals to support adequate nutrition first.      2. Exercise Recommendations:    Regular physical activity is CRITICAL for long term successful weight management.    Strive for a minimum weekly exercise goal of at least 200 minutes per week of aerobic exercise (45 minutes at least 5 days per week or 30 minutes daily)    Strength based resistance training is critical for helping to maintain and build lean body mass/muscle mass which is very important for long term health.  Having higher muscle mass is associated with better health outcomes and can help to maintain higher calorie expenditure.      Designing a Strength Program:  Select 3-4 exercises that target different major muscle groups.  - Emphasize the following movements \"pull, push, squat, hip hinge\"  \"Pull\" examples - pull up, " "bent over row or dumbbell row, pull down with weight bar or resistance band  \"Push\" examples - push up, bench press, chest flys, dips, overhead press, dumbbell bench press  \"Squat\" examples - air squat, chair squat, lunge steps, goblet squat, front squat, etc  \"Hip hinge\" examples - kettle bell swing, good morning, glute bridge, deadlift, suitcase pick ups, hip thrust    Perform two to three sets of eight to 15 repetitions of each exercise.  Try to use a resistance that feels like an \"8 out of 10\" effort, with 10 being the highest effort you can give.    To get stronger, try increasing either the weight, number of repetitions, number of sets or number of exercises every 4-8 weeks as you feel more comfortable with your current program.      3. Fluids  - Drink at least 60 oz of water every day.   - Wait 30 minutes before or after meals to drink fluids.  - Avoid carbonated beverages.    4. Vitamins  - Remember to take your multivitamins 2 times daily, once in the morning and once in the evening  - Take your calcium 2-3 times daily, at least 2 hours apart from the multivitamin - total daily dose at least 1200-1500mg per day.    - Vitamin D3 3000 units per day  - B12 - depending on your blood work and how well you absorb B12 from your multivitamin you may need additional B12 of 350-500mcg oral per day.    5. Labs  - We will check labs today and results will be communicated via [facility] My Chart  - A copy of the results can be viewed on  My Chart.      Follow-up with Dietitian for bariatric diet optimization  Follow-up with PCP for general health questions and ongoing management of routine health concerns        You have been prescribed Phentermine for weight loss.     This medication will decrease your appetite.  This medication is FDA approved as short term anorexiant/appetite suppressant in treatment of obesity    Side Effects include: difficulty sleeping, dry mouth, constipation, increased heart rate, increased blood " pressure and heart palpitations.   If any difficulty sleeping, take the medication first thing in the morning.  I recommend avoiding additional caffeine intake while taking this medication or sparing use of caffeinated beverages as they can increase the stimulating and heart rate/blood pressure elevating effects of the medication.    If experiencing constipation, add a daily fiber supplement such a psyllium husk fiber or benefiber, 1-2 scoops in 8-12 oz of water per day.  You can also add an over the counter stool softener.  Be sure to increase fluid intake, aim for a minimum of 60 oz of water daily.    IMPORTANT Lifestyle Management with the use of anti-obesity medication include:  - Monitor/track your protein intake, ensure at least 80-90g of protein per day for women, 110-120g for men -> this helps to ensure you are consuming adequate protein to maintain/preserve lean body mass in weight loss setting.  - Consume at least 64 oz of water per day -> this helps to reduce the risk of constipation/dehydration associated with this medication  - Engage in resistance at least 2x/week - targeting upper & lower body group with each resistance training session -> this helps to ensure you maintain/preserve lean body mass in weight loss setting.    *IMPORTANT*  This medication will cause a positive AMPHETAMINE result on a drug screen.  Please notify your prescribing provider if you require a letter stating you are using a prescription medication which causes this result.      You will need to been for regular follow up while on this medication.  If you do not have a result of at least a 5% total body weight loss from the start of this medication it will be discontinued due to ineffective treatment.      Please be aware you will need to monitor heart rate & blood pressure at home with home blood pressure monitoring cuff upon starting this medication to monitor for increased to heart rate and blood pressure.  --> Blood pressure  cuff is over the counter purchase - can be found at pharmacy, online like [employer] or retail stores in health section.  Most blood pressure cuffs are approx $20-$40 and a useful tool to have for home monitoring of health parameters.     Prior authorizations will not be completed for this medication.  It is a generic medication and if not covered by your insurance, recommend using GoodRx or other prescription discount program for coupon.

## 2025-03-10 ENCOUNTER — APPOINTMENT (OUTPATIENT)
Dept: SURGERY | Facility: CLINIC | Age: 60
End: 2025-03-10
Payer: COMMERCIAL

## 2025-03-11 ENCOUNTER — TELEMEDICINE (OUTPATIENT)
Dept: SURGERY | Facility: CLINIC | Age: 60
End: 2025-03-11
Payer: COMMERCIAL

## 2025-03-11 VITALS — WEIGHT: 172 LBS | HEIGHT: 67 IN | BODY MASS INDEX: 27 KG/M2

## 2025-03-11 DIAGNOSIS — K91.2 POSTOPERATIVE MALABSORPTION (HHS-HCC): Primary | ICD-10-CM

## 2025-03-11 DIAGNOSIS — Z98.84 S/P GASTRIC BYPASS: ICD-10-CM

## 2025-03-11 DIAGNOSIS — E66.3 OVERWEIGHT WITH BODY MASS INDEX (BMI) OF 28 TO 28.9 IN ADULT: ICD-10-CM

## 2025-03-11 PROCEDURE — 99213 OFFICE O/P EST LOW 20 MIN: CPT | Mod: CSA

## 2025-03-11 PROCEDURE — 3008F BODY MASS INDEX DOCD: CPT

## 2025-03-11 PROCEDURE — 99401 PREV MED CNSL INDIV APPRX 15: CPT

## 2025-03-11 PROCEDURE — 99213 OFFICE O/P EST LOW 20 MIN: CPT

## 2025-03-11 RX ORDER — PHENTERMINE HYDROCHLORIDE 37.5 MG/1
37.5 TABLET ORAL
Qty: 30 TABLET | Refills: 0 | Status: SHIPPED | OUTPATIENT
Start: 2025-03-11 | End: 2025-04-10

## 2025-03-11 NOTE — ASSESSMENT & PLAN NOTE
BMI 27  Resume AOM phentermine for 3 months following todays visit.   CSA signed/reviewed. OARRS reviewed.  Follow up 4 weeks

## 2025-03-31 PROBLEM — Z71.3 DIETARY COUNSELING: Status: ACTIVE | Noted: 2025-03-31

## 2025-03-31 NOTE — PROGRESS NOTES
BARIATRIC SURGERY CLINIC  Comprehensive Weight Management        Name: Malathi Castellanos  MRN: [id number]    Index Surgery  Date of Surgery: 11/20/2024   Surgeon: Joy    Surgical Procedure: Revision: Yris en Y gastric bypass revision  45539    Virtual or Telephone Consent:  An interactive audio and video telecommunication system which permits real time communications between the patient (at the originating site) and provider (at the distant site) was utilized to provide this telehealth service. Verbal consent was requested and obtained from Malathi Castellanos on this date, 04/01/25 for a telehealth visit and the patient's location was confirmed at the time of the visit.    HPI   Malathi Castellanos is a 59 y.o. female presenting for follow up s/p ex-lap with NARCISO, Small bowel resection w/ revision jejunostomy 11/20/2024.     Diet:  - Stage: regular food diet  - Achieving protein and fluid goals most days: yes     Exercise:  -  walking    Concerns related to:  Nausea/Vomiting, Reflux: denies  Abdominal Pain: denies  Diarrhea/Constipation- bowel function is regular    Daily Supplements:  Calcium: Calcium Citrate w/ vitamin D (1200 - 1500mg)  Multivitamin & Minerals: 2 per day  Vitamin B12: 500 mcg/day   Vitamin D3: 3000 units   Iron/Other: iron supplement   PPI: taking    Primary Medical Hx:  Patient Active Problem List   Diagnosis    Absence of breast, acquired    Acquired absence of both nipples    Acute otitis media with perforation, left    Acute serous otitis media of right ear without rupture    Central perforation of tympanic membrane    Chronic sinusitis    Acquired hypothyroidism    Lipoma    Lung granuloma (Multi)    Pulmonary nodules    Menieres disease    Mixed hearing loss of left ear    Nasal obstruction    Nasal polyps    S/P gastric bypass    Sensorineural hearing loss of right ear    Vitamin B12 deficiency    Overweight with body mass index (BMI) of 28 to 28.9 in adult    Anemia    Otalgia, right    Bariatric surgery  status    Abdominal pain with vomiting and history of abdominal surgery    Postoperative malabsorption (HHS-HCC)    Dietary counseling      Past Surgical History:   Procedure Laterality Date    BELT ABDOMINOPLASTY  11/04/2014    Abdominoplasty    CHOLECYSTECTOMY      CT GUIDED PERCUTANEOUS BIOPSY LUNG  01/06/2020    CT GUIDED PERCUTANEOUS BIOPSY LUNG 1/6/2020 [facility]    GASTRIC BYPASS  11/04/2014    Gastric Surgery For Morbid Obesity Gastric Bypass    HERNIA REPAIR  11/04/2014    Hernia Repair    HYSTERECTOMY      LYMPH NODE BIOPSY      MASTECTOMY  11/04/2014    Breast Surgery Mastectomy    OTHER SURGICAL HISTORY  11/04/2014    Breast Reconstruction With Implant Prosthesis Bilateral    OTHER SURGICAL HISTORY  11/04/2014    Breast Reconstruction With Tissue Expander Bilateral    OTHER SURGICAL HISTORY  12/05/2022    Cardiac catheterization    OTHER SURGICAL HISTORY  03/19/2019    Eye surgery    OTHER SURGICAL HISTORY  03/18/2019    Ear pressure equalization tube insertion    OTHER SURGICAL HISTORY  03/18/2019    Yris-en-Y gastric bypass    OTHER SURGICAL HISTORY  03/18/2019    Hand surgery    OTHER SURGICAL HISTORY  03/18/2019    Hysterectomy    OTHER SURGICAL HISTORY  04/15/2015    Breast Surgery Revision Of Reconstructed Left Breast    SINUS SURGERY      TONSILLECTOMY      TUBAL LIGATION  11/04/2014    Tubal Ligation      Social History     Socioeconomic History    Marital status:      Spouse name: Not on file    Number of children: Not on file    Years of education: Not on file    Highest education level: Not on file   Occupational History    Not on file   Tobacco Use    Smoking status: Former     Types: Cigarettes    Smokeless tobacco: Never   Vaping Use    Vaping status: Never Used   Substance and Sexual Activity    Alcohol use: Not Currently     Comment: social    Drug use: Never    Sexual activity: Not Currently   Other Topics Concern    Not on file   Social History Narrative    Not on file      Social Drivers of Health     Financial Resource Strain: Low Risk  (11/20/2024)    Overall Financial Resource Strain (CARDIA)     Difficulty of Paying Living Expenses: Not hard at all   Food Insecurity: No Food Insecurity (11/20/2024)    Hunger Vital Sign     Worried About Running Out of Food in the Last Year: Never true     Ran Out of Food in the Last Year: Never true   Transportation Needs: No Transportation Needs (11/21/2024)    PRAPARE - Transportation     Lack of Transportation (Medical): No     Lack of Transportation (Non-Medical): No   Physical Activity: Sufficiently Active (11/20/2024)    Exercise Vital Sign     Days of Exercise per Week: 7 days     Minutes of Exercise per Session: 30 min   Stress: No Stress Concern Present (11/20/2024)    [employer] - Occupational Stress Questionnaire     Feeling of Stress : Not at all   Social Connections: Moderately Isolated (11/20/2024)    Social Connection and Isolation Panel [NHANES]     Frequency of Communication with Friends and Family: More than three times a week     Frequency of Social Gatherings with Friends and Family: Once a week     Attends [employer] Services: Never     Active Member of Clubs or Organizations: No     Attends Club or Organization Meetings: Never     Marital Status:    Intimate Partner Violence: Not At Risk (11/20/2024)    Humiliation, Afraid, Rape, and Kick questionnaire     Fear of Current or Ex-Partner: No     Emotionally Abused: No     Physically Abused: No     Sexually Abused: No   Housing Stability: Low Risk  (11/21/2024)    Housing Stability Vital Sign     Unable to Pay for Housing in the Last Year: No     Number of Times Moved in the Last Year: 1     Homeless in the Last Year: No     Family History   Problem Relation Name Age of Onset    Prostate cancer Father      Heart attack Maternal Grandmother      Stroke Maternal Grandfather Frandy     Cancer Paternal Grandfather Steven       Current  "Outpatient Medications on File Prior to Visit   Medication Sig Dispense Refill    cyanocobalamin (Vitamin B-12) 1,000 mcg/mL injection INJECT 1 ML INTO THE SHOULDER, THIGH, OR BUTTOCKS EVERY 30 DAYS 10 mL 0    fexofenadine (Allegra) 180 mg tablet Take 1 tablet (180 mg) by mouth once daily.      levothyroxine (Synthroid, Levoxyl) 50 mcg tablet Take 1 tablet (50 mcg) by mouth once daily. 90 tablet 3    omeprazole (PriLOSEC) 40 mg DR capsule Take 1 capsule (40 mg) by mouth once daily. Open Capsule, sprinkle over sugar free applesauce or pudding - Do not crush or chew. 30 capsule 2    triamterene-hydrochlorothiazid (Maxzide-25) 37.5-25 mg tablet Take 1 tablet by mouth once daily. 90 tablet 3    [DISCONTINUED] phentermine (Adipex-P) 37.5 mg tablet Take 1 tablet (37.5 mg) by mouth once daily in the morning. Take before meals. 30 tablet 0     No current facility-administered medications on file prior to visit.      Allergies   Allergen Reactions    Prednisone Shortness of breath    Promethazine Hives    Codeine Diarrhea and Other     Stomach Burning    Hydrocodone-Acetaminophen Other     \"Stomach burns\"    Ibuprofen Other     Cannot take due to gastric bypass    Moxifloxacin Hives and Unknown     Pt states it caused cell disruption    Nsaids (Non-Steroidal Anti-Inflammatory Drug) Other     Cannot take due to gastric bypass      Vancomycin Rash     the patient reports that she had \"red man syndrome\" when taking Vancomycin        REVIEW OF SYSTEMS:  Negative unless otherwise reviewed in HPI.    PHYSICAL EXAM   Physical Exam   Ht: 5'7\"  Wt: 172 lbs   BMI: 27.0   Alert, well appearing, no acute distress, nourished, hydrated.  Anicteric sclera, no ptosis  Facial symmetry  Neck supple  Unlabored respirations.  Easily conversant without increased respiratory effort  Oriented to person, place, time.  Judgement intact.  Appropriate mood, affect.     ASSESSMENT & PLAN   59 y.o. female presenting for bariatric follow up s/p revision " 11/2024. Doing well today just over 3 mo pov. No acute issues or concerns. Will get labs for evaluation of micronutrients. She wants to resume AOM phentermine which she last took 08/2024 prior to surgery for polyphagia. Reviewed medication risks/benefits, contraindications and expected outcomes with use. She will monitor HR and BP. CSA reviewed and signed. OARRS reviewed. Complete month two at this time with addition of AOM phentermine. She has no current contraindications to medication use. Information for cost savings and cash pay for injectables provided. Follow up in 4 weeks.     Weight Impression:  -Today's Weight: 172 lbs     Problem List Items Addressed This Visit       S/P gastric bypass     Date of Surgery: 11/20/2024   S/p LAPAROSCOPIC EXPLORATION/ LYSIS OF ADHESIONS/ SMALL BOWEL RESECTION WITH ANASTOMOSIS (REVISION OF JEJUNOSTOMY) EGD/ TAPBLOCK on 11/20/24.     Patient is doing well   no acute issues   takes supplements   does regular exercise  no pain or GERD   Continue ppi     Recs:      doing well  No acute issues   advised to continue Diet, exercise,  portion control   visual cues for portion control   Regular FU with dietitian   continue life long vitamin supplementation   support groups  will check labs            Overweight with body mass index (BMI) of 28 to 28.9 in adult     BMI 27  Resume AOM phentermine for 3 months following todays visit.   CSA signed/reviewed. OARRS reviewed.  Follow up 4 weeks         Relevant Medications    phentermine (Adipex-P) 37.5 mg tablet    topiramate (Topamax) 50 mg tablet    Postoperative malabsorption (HHS-HCC) - Primary     Check micronutrient levels - see orders  Adjust micronutrient supplementation per results  Continue recommended post bariatric surgery supplements         Dietary counseling     - Counseled on benefits of increased regular exercise, both aerobic & resistance training.  Reviewed benefits of resistance training to enhance/maintain lean body  mass.  - Counseled on dietary modifications to support weight reduction, reduced calorie diet, encourage adequate protein, increased dietary fiber from fruit and vegetable to improve appetite/satiety regulation and quality of diet.  Discussed impact of processed foods and liquid calories to weight gain & contribution to dysfunctional appetite signals.   -  Reviewed importance of intensification of lifestyle therapy in weight reduction and maintenance, set behavioral modification goals of nutrition, physical activity or behavioral practices to benefit weight reduction.    - Discussed self monitoring practices to ensure reduce calorie diet, emphasizing increased dietary protein & fiber.  Reviewed protein targets per meal as recommendation to help with satiety and lean mass maintenance.          Please see Patient Instructions for today's relevant referrals, orders and instructions.  All documented preventative counseling occurred face to face for duration of 15 minutes.     Follow Up: Follow up in about 4 weeks (around 4/29/2025).     Char Zafar, MADHU-CNP

## 2025-03-31 NOTE — PATIENT INSTRUCTIONS
Please type each website into your internet browser.     Patient Assistance Program Application:    [email or web address]     [employer] Clinical Trials (offer medication to patients and receive payment for study participation), visit site below and click on 'find a trial' For Clinical Studies look for Zepbound (tirzepatide) or Retatrutide.   [email or web address]     If the above links do not provide help with obtaining the medication please reach out to [employer] to ask for recommended secondary insurance provider.         The following are the recommendations we discussed at your appointment today:  1. Nutrition  - Please make sure you are seeing the bariatric dietitian regularly.    Dietitian Information:   Sheryl Bee: [phone]  [facility] - [place] [phone]    Your Protein Goals will gradually increase as you get further out from surgery:  0-3 months - 60 GRAMS PER DAY  3-6 months - 60-75 GRAMS PER DAY  6-12 months - 75-90 GRAMS PER DAY  12+ months - 80-90 GRAMS PER DAY    - Follow Pouch Rules;.   Stop drinking 30 minutes before your meals, Take 30 minutes to eat your meal   - NO DRINKING DURING MEALS, Wait for 30 minutes after your meal to drink  - Eat 5 servings of fruits and veggies daily.  A serving is 1 small (tennis ball size) piece of whole fruit, 1/2 cup fresh fruit, 1 cup non starchy vegetables  - Eat 3 small meals and 1-2 healthy, protein rich, snacks per day.    EAT PROTEIN FIRST AT MEALS, 2nd non starchy vegetable or fruit serving, consume starches last and aim for whole grains of small portion.  This pattern of eating ensures you are getting full on high quality protein and higher fiber foods with many vitamins and minerals to support adequate nutrition first.      2. Exercise Recommendations:    Regular physical activity is CRITICAL for long term successful weight management.    Strive for a  "minimum weekly exercise goal of at least 200 minutes per week of aerobic exercise (45 minutes at least 5 days per week or 30 minutes daily)    Strength based resistance training is critical for helping to maintain and build lean body mass/muscle mass which is very important for long term health.  Having higher muscle mass is associated with better health outcomes and can help to maintain higher calorie expenditure.      Designing a Strength Program:  Select 3-4 exercises that target different major muscle groups.  - Emphasize the following movements \"pull, push, squat, hip hinge\"  \"Pull\" examples - pull up, bent over row or dumbbell row, pull down with weight bar or resistance band  \"Push\" examples - push up, bench press, chest flys, dips, overhead press, dumbbell bench press  \"Squat\" examples - air squat, chair squat, lunge steps, goblet squat, front squat, etc  \"Hip hinge\" examples - kettle bell swing, good morning, glute bridge, deadlift, suitcase pick ups, hip thrust    Perform two to three sets of eight to 15 repetitions of each exercise.  Try to use a resistance that feels like an \"8 out of 10\" effort, with 10 being the highest effort you can give.    To get stronger, try increasing either the weight, number of repetitions, number of sets or number of exercises every 4-8 weeks as you feel more comfortable with your current program.      3. Fluids  - Drink at least 60 oz of water every day.   - Wait 30 minutes before or after meals to drink fluids.  - Avoid carbonated beverages.    4. Vitamins  - Remember to take your multivitamins 2 times daily, once in the morning and once in the evening  - Take your calcium 2-3 times daily, at least 2 hours apart from the multivitamin - total daily dose at least 1200-1500mg per day.    - Vitamin D3 3000 units per day  - B12 - depending on your blood work and how well you absorb B12 from your multivitamin you may need additional B12 of 350-500mcg oral per day.    5. Labs  - " We will check labs today and results will be communicated via  [facility] My Chart  - A copy of the results can be viewed on  My Chart.      Follow-up with Dietitian for bariatric diet optimization  Follow-up with PCP for general health questions and ongoing management of routine health concerns      You have been prescribed Phentermine for weight loss.     This medication will decrease your appetite.  This medication is FDA approved as short term anorexiant/appetite suppressant in treatment of obesity    Side Effects include: difficulty sleeping, dry mouth, constipation, increased heart rate, increased blood pressure and heart palpitations.   If any difficulty sleeping, take the medication first thing in the morning.  I recommend avoiding additional caffeine intake while taking this medication or sparing use of caffeinated beverages as they can increase the stimulating and heart rate/blood pressure elevating effects of the medication.    If experiencing constipation, add a daily fiber supplement such a psyllium husk fiber or benefiber, 1-2 scoops in 8-12 oz of water per day.  You can also add an over the counter stool softener.  Be sure to increase fluid intake, aim for a minimum of 60 oz of water daily.    IMPORTANT Lifestyle Management with the use of anti-obesity medication include:  - Monitor/track your protein intake, ensure at least 80-90g of protein per day for women, 110-120g for men -> this helps to ensure you are consuming adequate protein to maintain/preserve lean body mass in weight loss setting.  - Consume at least 64 oz of water per day -> this helps to reduce the risk of constipation/dehydration associated with this medication  - Engage in resistance at least 2x/week - targeting upper & lower body group with each resistance training session -> this helps to ensure you maintain/preserve lean body mass in weight loss setting.    *IMPORTANT*  This medication will cause a positive AMPHETAMINE result on a  drug screen.  Please notify your prescribing provider if you require a letter stating you are using a prescription medication which causes this result.      You will need to been for regular follow up while on this medication.  If you do not have a result of at least a 5% total body weight loss from the start of this medication it will be discontinued due to ineffective treatment.      Please be aware you will need to monitor heart rate & blood pressure at home with home blood pressure monitoring cuff upon starting this medication to monitor for increased to heart rate and blood pressure.  --> Blood pressure cuff is over the counter purchase - can be found at pharmacy, online like [employer] or retail stores in health section.  Most blood pressure cuffs are approx $20-$40 and a useful tool to have for home monitoring of health parameters.     Prior authorizations will not be completed for this medication.  It is a generic medication and if not covered by your insurance, recommend using [employer] or other prescription discount program for coupon.

## 2025-03-31 NOTE — ASSESSMENT & PLAN NOTE
- Counseled on benefits of increased regular exercise, both aerobic & resistance training.  Reviewed benefits of resistance training to enhance/maintain lean body mass.  - Counseled on dietary modifications to support weight reduction, reduced calorie diet, encourage adequate protein, increased dietary fiber from fruit and vegetable to improve appetite/satiety regulation and quality of diet.  Discussed impact of processed foods and liquid calories to weight gain & contribution to dysfunctional appetite signals.   -  Reviewed importance of intensification of lifestyle therapy in weight reduction and maintenance, set behavioral modification goals of nutrition, physical activity or behavioral practices to benefit weight reduction.    - Discussed self monitoring practices to ensure reduce calorie diet, emphasizing increased dietary protein & fiber.  Reviewed protein targets per meal as recommendation to help with satiety and lean mass maintenance.

## 2025-04-01 ENCOUNTER — TELEMEDICINE (OUTPATIENT)
Dept: SURGERY | Facility: CLINIC | Age: 60
End: 2025-04-01
Payer: COMMERCIAL

## 2025-04-01 VITALS — BODY MASS INDEX: 27 KG/M2 | WEIGHT: 172 LBS | HEIGHT: 67 IN

## 2025-04-01 DIAGNOSIS — Z98.84 S/P GASTRIC BYPASS: ICD-10-CM

## 2025-04-01 DIAGNOSIS — Z71.3 DIETARY COUNSELING: ICD-10-CM

## 2025-04-01 DIAGNOSIS — E66.3 OVERWEIGHT WITH BODY MASS INDEX (BMI) OF 28 TO 28.9 IN ADULT: ICD-10-CM

## 2025-04-01 DIAGNOSIS — K91.2 POSTOPERATIVE MALABSORPTION (HHS-HCC): Primary | ICD-10-CM

## 2025-04-01 PROCEDURE — 99213 OFFICE O/P EST LOW 20 MIN: CPT

## 2025-04-01 PROCEDURE — 3008F BODY MASS INDEX DOCD: CPT

## 2025-04-01 PROCEDURE — 99401 PREV MED CNSL INDIV APPRX 15: CPT

## 2025-04-01 PROCEDURE — 99213 OFFICE O/P EST LOW 20 MIN: CPT | Mod: 25,95

## 2025-04-01 RX ORDER — PHENTERMINE HYDROCHLORIDE 37.5 MG/1
37.5 TABLET ORAL
Qty: 30 TABLET | Refills: 0 | Status: SHIPPED | OUTPATIENT
Start: 2025-04-01 | End: 2025-05-01

## 2025-04-01 RX ORDER — TOPIRAMATE 50 MG/1
TABLET, FILM COATED ORAL
Qty: 60 TABLET | Refills: 2 | Status: SHIPPED | OUTPATIENT
Start: 2025-04-01

## 2025-04-03 ENCOUNTER — PATIENT MESSAGE (OUTPATIENT)
Dept: SURGERY | Facility: CLINIC | Age: 60
End: 2025-04-03
Payer: COMMERCIAL

## 2025-04-08 ENCOUNTER — APPOINTMENT (OUTPATIENT)
Dept: SURGERY | Facility: CLINIC | Age: 60
End: 2025-04-08
Payer: COMMERCIAL

## 2025-05-05 DIAGNOSIS — E03.9 ACQUIRED HYPOTHYROIDISM: ICD-10-CM

## 2025-05-05 RX ORDER — LEVOTHYROXINE SODIUM 50 UG/1
50 TABLET ORAL DAILY
Qty: 90 TABLET | Refills: 3 | Status: SHIPPED | OUTPATIENT
Start: 2025-05-05 | End: 2026-05-05

## 2025-05-05 NOTE — PROGRESS NOTES
BARIATRIC SUR[facility]ERY CLINIC  Comprehensive Weight Management        Name: Malathi Castellanos  MRN: [id number]    Index Surgery  Date of Surgery: 11/20/2024   Surgeon: Joy    Surgical Procedure: Revision: Yris en Y gastric bypass revision  71087    Virtual or Telephone Consent  An interactive audio and video telecommunication system which permits real time communications between the patient (at the originating site) and provider (at the distant site) was utilized to provide this telehealth service. Verbal consent was requested and obtained from Malathi Castellanos on this date, 05/06/25 for a telehealth visit and the patient's location was confirmed at the time of the visit.    HPI   Malathi Castellanos is a 60 y.o. female presenting for bariatric follow up and comprehensive obesity management. After the new year she wanted to explore options for GLP to further weight loss as she is near her goal but cycling between 180-190 lbs. Unfortunately no glp coverage with current rx plan. She is s/p ex-lap/ bariatric revision with NARCISO, Small bowel resection and revision jejunostomy 11/20/2024. No acute surgically related issues. Today is follow up for evaluation of AOM tx, phentermine with prescription management.     Diet:  - Stage: regular food diet  - Achieving protein and fluid goals most days: yes     Exercise:  -  walking, increasing as tolerated.    Concerns related to:  Nausea/Vomiting, Reflux: denies  Abdominal Pain: denies  Diarrhea/Constipation- bowel function is regular    Daily Supplements:  Calcium: Calcium Citrate w/ vitamin D (1200 - 1500mg)  Multivitamin & Minerals: 2 per day  Vitamin B12: 500 mcg/day   Vitamin D3: 3000 units   Iron/Other: iron supplement   PPI: taking    Primary Medical Hx:  Patient Active Problem List   Diagnosis    Absence of breast, acquired    Acquired absence of both nipples    Acute otitis media with perforation, left    Acute serous otitis media of right ear without rupture    Central perforation of  tympanic membrane    Chronic sinusitis    Acquired hypothyroidism    Lipoma    Lung granuloma (Multi)    Pulmonary nodules    Menieres disease    Mixed hearing loss of left ear    Nasal obstruction    Nasal polyps    S/P gastric bypass    Sensorineural hearing loss of right ear    Vitamin B12 deficiency    Overweight with body mass index (BMI) of 28 to 28.9 in adult    Anemia    Otalgia, right    Bariatric surgery status    Abdominal pain with vomiting and history of abdominal surgery    Postoperative malabsorption (HHS-HCC)    Dietary counseling      Past Surgical History:   Procedure Laterality Date    BELT ABDOMINOPLASTY  11/04/2014    Abdominoplasty    CHOLECYSTECTOMY      CT GUIDED PERCUTANEOUS BIOPSY LUNG  01/06/2020    CT GUIDED PERCUTANEOUS BIOPSY LUNG 1/6/2020 [facility]    GASTRIC BYPASS  11/04/2014    Gastric Surgery For Morbid Obesity Gastric Bypass    HERNIA REPAIR  11/04/2014    Hernia Repair    HYSTERECTOMY      LYMPH NODE BIOPSY      MASTECTOMY  11/04/2014    Breast Surgery Mastectomy    OTHER SURGICAL HISTORY  11/04/2014    Breast Reconstruction With Implant Prosthesis Bilateral    OTHER SURGICAL HISTORY  11/04/2014    Breast Reconstruction With Tissue Expander Bilateral    OTHER SURGICAL HISTORY  12/05/2022    Cardiac catheterization    OTHER SURGICAL HISTORY  03/19/2019    Eye surgery    OTHER SURGICAL HISTORY  03/18/2019    Ear pressure equalization tube insertion    OTHER SURGICAL HISTORY  03/18/2019    Yris-en-Y gastric bypass    OTHER SURGICAL HISTORY  03/18/2019    Hand surgery    OTHER SURGICAL HISTORY  03/18/2019    Hysterectomy    OTHER SURGICAL HISTORY  04/15/2015    Breast Surgery Revision Of Reconstructed Left Breast    SINUS SURGERY      TONSILLECTOMY      TUBAL LIGATION  11/04/2014    Tubal Ligation      Social History     Socioeconomic History    Marital status:      Spouse name: Not on file    Number of children: Not on file    Years of education: Not on file    Highest  education level: Not on file   Occupational History    Not on file   Tobacco Use    Smoking status: Former     Types: Cigarettes    Smokeless tobacco: Never   Vaping Use    Vaping status: Never Used   Substance and Sexual Activity    Alcohol use: Not Currently     Comment: social    Drug use: Never    Sexual activity: Not Currently   Other Topics Concern    Not on file   Social History Narrative    Not on file     Social Drivers of Health     Financial Resource Strain: Low Risk  (11/20/2024)    Overall Financial Resource Strain (CARDIA)     Difficulty of Paying Living Expenses: Not hard at all   Food Insecurity: No Food Insecurity (11/20/2024)    Hunger Vital Sign     Worried About Running Out of Food in the Last Year: Never true     Ran Out of Food in the Last Year: Never true   Transportation Needs: No Transportation Needs (11/21/2024)    PRAPARE - Transportation     Lack of Transportation (Medical): No     Lack of Transportation (Non-Medical): No   Physical Activity: Sufficiently Active (11/20/2024)    Exercise Vital Sign     Days of Exercise per Week: 7 days     Minutes of Exercise per Session: 30 min   Stress: No Stress Concern Present (11/20/2024)    [employer] - Occupational Stress Questionnaire     Feeling of Stress : Not at all   Social Connections: Moderately Isolated (11/20/2024)    Social Connection and Isolation Panel [NHANES]     Frequency of Communication with Friends and Family: More than three times a week     Frequency of Social Gatherings with Friends and Family: Once a week     Attends Restorationist Services: Never     Active Member of Clubs or Organizations: No     Attends Club or Organization Meetings: Never     Marital Status:    Intimate Partner Violence: Not At Risk (11/20/2024)    Humiliation, Afraid, Rape, and Kick questionnaire     Fear of Current or Ex-Partner: No     Emotionally Abused: No     Physically Abused: No     Sexually Abused: No   Housing  "Stability: Low Risk  (11/21/2024)    Housing Stability Vital Sign     Unable to Pay for Housing in the Last Year: No     Number of Times Moved in the Last Year: 1     Homeless in the Last Year: No     Family History   Problem Relation Name Age of Onset    Prostate cancer Father      Heart attack Maternal Grandmother      Stroke Maternal Grandfather Frandy     Cancer Paternal Grandfather Steven       Current Outpatient Medications on File Prior to Visit   Medication Sig Dispense Refill    cyanocobalamin (Vitamin B-12) 1,000 mcg/mL injection INJECT 1 ML INTO THE SHOULDER, THIGH, OR BUTTOCKS EVERY 30 DAYS 10 mL 0    fexofenadine (Allegra) 180 mg tablet Take 1 tablet (180 mg) by mouth once daily.      levothyroxine (Synthroid, Levoxyl) 50 mcg tablet Take 1 tablet (50 mcg) by mouth once daily. 90 tablet 3    omeprazole (PriLOSEC) 40 mg DR capsule Take 1 capsule (40 mg) by mouth once daily. Open Capsule, sprinkle over sugar free applesauce or pudding - Do not crush or chew. 30 capsule 2    triamterene-hydrochlorothiazid (Maxzide-25) 37.5-25 mg tablet Take 1 tablet by mouth once daily. 90 tablet 3    [DISCONTINUED] levothyroxine (Synthroid, Levoxyl) 50 mcg tablet Take 1 tablet (50 mcg) by mouth once daily. 90 tablet 3    [DISCONTINUED] phentermine (Adipex-P) 37.5 mg tablet Take 1 tablet (37.5 mg) by mouth once daily in the morning. Take before meals. 30 tablet 0    [DISCONTINUED] topiramate (Topamax) 50 mg tablet Take 1 tablet PO qPM x 1-2 weeks then increase to 1 tablet PO BID 60 tablet 2     No current facility-administered medications on file prior to visit.      Allergies   Allergen Reactions    Prednisone Shortness of breath    Promethazine Hives    Codeine Diarrhea and Other     Stomach Burning    Hydrocodone-Acetaminophen Other     \"Stomach burns\"    Ibuprofen Other     Cannot take due to gastric bypass    Moxifloxacin Hives and Unknown     Pt states it caused cell disruption    Nsaids (Non-Steroidal " "Anti-Inflammatory Drug) Other     Cannot take due to gastric bypass      Vancomycin Rash     the patient reports that she had \"red man syndrome\" when taking Vancomycin        REVIEW OF SYSTEMS:  Negative unless otherwise reviewed in HPI.    PHYSICAL EXAM   Physical Exam   Ht: 5'7\"  Wt: 172 lbs BMI: 26.94   /86 (self-reported)  Alert, well appearing, no acute distress, nourished, hydrated.  Anicteric sclera, no ptosis  Facial symmetry  Neck supple  Unlabored respirations.  Easily conversant without increased respiratory effort  Oriented to person, place, time.  Judgement intact.  Appropriate mood, affect.     ASSESSMENT & PLAN   60 y.o. female presenting for comprehensive obesity management follow up visit. She is s/p bariatric revision 11/2024. No acute surgically related issues. Continues PPI and bariatric supplements. Micronutrient labs are current. Doing well with current treatment, AOM phentermine. On month 2/3. She did a prior 12 week course of treatment in August 2024 and did achieve 5% TBWL. Feels polyphagia, certain cravings are controlled with medication use. Denies side effect or ADR. Meeting protein and fluid goals. She continues to monitor HR and BP. Reviewed medication risks/benefits, contraindications and expected outcomes with use. CSA reviewed and signed. OARRS reviewed. Complete month three following today's visit. She has no current contraindications to medication use. Follow up in four weeks, sooner if needed.     Weight Impression:  - Initial Weight: 180 lbs   -Today's Weight: 172 lbs     Problem List Items Addressed This Visit       S/P gastric bypass    Date of Surgery: 11/20/2024   S/p LAPAROSCOPIC EXPLORATION/ LYSIS OF ADHESIONS/ SMALL BOWEL RESECTION WITH ANASTOMOSIS (REVISION OF JEJUNOSTOMY) EGD/ TAPBLOCK on 11/20/24.     Doing well overall, no acute surgically related issues or concerns.  Taking appropriate supplements  Achieving protein and fluid goals each day.  does regular " exercise  no pain or GERD   Continue ppi, had recurrence of reflux when attempting taper off.     Recs:      doing well, no acute issues.  Continue to follow up with bariatric dietitian.  Micronutrient labs are current  Continue to increase exercise as able.  Reviewed guide for portion control  / visual cues for portion control   Regular FU with dietitian   continue life long vitamin supplementation   support groups            Overweight with body mass index (BMI) of 28 to 28.9 in adult - Primary    BMI 27 (26.94)    lbs  Resume AOM phentermine for 3 months, currently on month 2/3 doing well without ADR.   CSA signed/reviewed. OARRS reviewed. Counseled on diet and exercise.   Follow up 4 weeks         Relevant Medications    phentermine (Adipex-P) 37.5 mg tablet    Postoperative malabsorption (HHS-HCC)    - continue current vitamins and supplements as ordered.  - continue MVI 2x day, calcium citrate with vit d, b-12         Dietary counseling    - Reviewed high protein snack ideas. Reviewed goal of increasing protein servings per meal. Add increased vegetables and fruits throughout the day. Partially meeting goals from visit prior. Does feel medication helps control polyphagia.     Counseled on benefits of increased regular exercise, both aerobic & resistance training.  Reviewed benefits of resistance training to enhance/maintain lean body mass.  - Counseled on dietary modifications to support weight reduction, reduced calorie diet, encourage adequate protein, increased dietary fiber from fruit and vegetable to improve appetite/satiety regulation and quality of diet.  Discussed impact of processed foods and liquid calories to weight gain & contribution to dysfunctional appetite signals.   -  Reviewed importance of intensification of lifestyle therapy in weight reduction and maintenance, set behavioral modification goals of nutrition, physical activity or behavioral practices to benefit weight reduction.    -  Discussed self monitoring practices to ensure reduce calorie diet, emphasizing increased dietary protein & fiber.  Reviewed protein targets per meal as recommendation to help with satiety and lean mass maintenance.          Please see Patient Instructions for today's relevant referrals, orders and instructions.  All documented preventative counseling occurred face to face for duration of 15 minutes.     Follow Up: Follow up in about 4 weeks (around 6/3/2025).     Char Zafar, APRN-CNP

## 2025-05-05 NOTE — PATIENT INSTRUCTIONS
You have been prescribed Phentermine for weight loss.     This medication will decrease your appetite.  This medication is FDA approved as short term anorexiant/appetite suppressant in treatment of obesity    Side Effects include: difficulty sleeping, dry mouth, constipation, increased heart rate, increased blood pressure and heart palpitations.   If any difficulty sleeping, take the medication first thing in the morning.  I recommend avoiding additional caffeine intake while taking this medication or sparing use of caffeinated beverages as they can increase the stimulating and heart rate/blood pressure elevating effects of the medication.    If experiencing constipation, add a daily fiber supplement such a psyllium husk fiber or benefiber, 1-2 scoops in 8-12 oz of water per day.  You can also add an over the counter stool softener.  Be sure to increase fluid intake, aim for a minimum of 60 oz of water daily.    IMPORTANT Lifestyle Management with the use of anti-obesity medication include:  - Monitor/track your protein intake, ensure at least 80-90g of protein per day for women, 110-120g for men -> this helps to ensure you are consuming adequate protein to maintain/preserve lean body mass in weight loss setting.  - Consume at least 64 oz of water per day -> this helps to reduce the risk of constipation/dehydration associated with this medication  - Engage in resistance at least 2x/week - targeting upper & lower body group with each resistance training session -> this helps to ensure you maintain/preserve lean body mass in weight loss setting.    *IMPORTANT*  This medication will cause a positive AMPHETAMINE result on a drug screen.  Please notify your prescribing provider if you require a letter stating you are using a prescription medication which causes this result.      You will need to been for regular follow up while on this medication.  If you do not have a result of at least a 5% total body weight loss from  the start of this medication it will be discontinued due to ineffective treatment.      Please be aware you will need to monitor heart rate & blood pressure at home with home blood pressure monitoring cuff upon starting this medication to monitor for increased to heart rate and blood pressure.  --> Blood pressure cuff is over the counter purchase - can be found at pharmacy, online like [employer] or retail stores in health section.  Most blood pressure cuffs are approx $20-$40 and a useful tool to have for home monitoring of health parameters.     Prior authorizations will not be completed for this medication.  It is a generic medication and if not covered by your insurance, recommend using [employer] or other prescription discount program for coupon.

## 2025-05-06 ENCOUNTER — TELEMEDICINE (OUTPATIENT)
Dept: SURGERY | Facility: CLINIC | Age: 60
End: 2025-05-06
Payer: COMMERCIAL

## 2025-05-06 VITALS — BODY MASS INDEX: 27 KG/M2 | WEIGHT: 172 LBS | HEIGHT: 67 IN

## 2025-05-06 DIAGNOSIS — K91.2 POSTOPERATIVE MALABSORPTION (HHS-HCC): ICD-10-CM

## 2025-05-06 DIAGNOSIS — Z98.84 S/P GASTRIC BYPASS: ICD-10-CM

## 2025-05-06 DIAGNOSIS — Z71.3 DIETARY COUNSELING: ICD-10-CM

## 2025-05-06 DIAGNOSIS — E66.3 OVERWEIGHT WITH BODY MASS INDEX (BMI) OF 28 TO 28.9 IN ADULT: Primary | ICD-10-CM

## 2025-05-06 PROCEDURE — 99401 PREV MED CNSL INDIV APPRX 15: CPT

## 2025-05-06 PROCEDURE — RXMED WILLOW AMBULATORY MEDICATION CHARGE

## 2025-05-06 PROCEDURE — 99214 OFFICE O/P EST MOD 30 MIN: CPT | Mod: 25,95

## 2025-05-06 RX ORDER — PHENTERMINE HYDROCHLORIDE 37.5 MG/1
37.5 TABLET ORAL
Qty: 30 TABLET | Refills: 0 | Status: SHIPPED | OUTPATIENT
Start: 2025-05-06 | End: 2025-06-05

## 2025-05-06 NOTE — ASSESSMENT & PLAN NOTE
BMI 27 (26.94)    lbs  Resume AOM phentermine for 3 months, currently on month 2/3 doing well without ADR.   CSA signed/reviewed. OARRS reviewed. Counseled on diet and exercise.   Follow up 4 weeks

## 2025-05-06 NOTE — ASSESSMENT & PLAN NOTE
Date of Surgery: 11/20/2024   S/p LAPAROSCOPIC EXPLORATION/ LYSIS OF ADHESIONS/ SMALL BOWEL RESECTION WITH ANASTOMOSIS (REVISION OF JEJUNOSTOMY) EGD/ TAPBLOCK on 11/20/24.     Doing well overall, no acute surgically related issues or concerns.  Taking appropriate supplements  Achieving protein and fluid goals each day.  does regular exercise  no pain or GERD   Continue ppi, had recurrence of reflux when attempting taper off.     Recs:      doing well, no acute issues.  Continue to follow up with bariatric dietitian.  Micronutrient labs are current  Continue to increase exercise as able.  Reviewed guide for portion control  / visual cues for portion control   Regular FU with dietitian   continue life long vitamin supplementation   support groups

## 2025-05-06 NOTE — ASSESSMENT & PLAN NOTE
- continue current vitamins and supplements as ordered.  - continue MVI 2x day, calcium citrate with vit d, b-12

## 2025-05-06 NOTE — ASSESSMENT & PLAN NOTE
- Reviewed high protein snack ideas. Reviewed goal of increasing protein servings per meal. Add increased vegetables and fruits throughout the day. Partially meeting goals from visit prior. Does feel medication helps control polyphagia.     Counseled on benefits of increased regular exercise, both aerobic & resistance training.  Reviewed benefits of resistance training to enhance/maintain lean body mass.  - Counseled on dietary modifications to support weight reduction, reduced calorie diet, encourage adequate protein, increased dietary fiber from fruit and vegetable to improve appetite/satiety regulation and quality of diet.  Discussed impact of processed foods and liquid calories to weight gain & contribution to dysfunctional appetite signals.   -  Reviewed importance of intensification of lifestyle therapy in weight reduction and maintenance, set behavioral modification goals of nutrition, physical activity or behavioral practices to benefit weight reduction.    - Discussed self monitoring practices to ensure reduce calorie diet, emphasizing increased dietary protein & fiber.  Reviewed protein targets per meal as recommendation to help with satiety and lean mass maintenance.

## 2025-05-07 PROCEDURE — RXMED WILLOW AMBULATORY MEDICATION CHARGE

## 2025-05-08 ENCOUNTER — PHARMACY VISIT (OUTPATIENT)
Dept: PHARMACY | Facility: CLINIC | Age: 60
End: 2025-05-08
Payer: COMMERCIAL

## 2025-05-15 ENCOUNTER — OFFICE VISIT (OUTPATIENT)
Dept: PRIMARY CARE | Facility: CLINIC | Age: 60
End: 2025-05-15
Payer: COMMERCIAL

## 2025-05-15 VITALS
WEIGHT: 171.1 LBS | OXYGEN SATURATION: 99 % | HEIGHT: 67 IN | RESPIRATION RATE: 16 BRPM | SYSTOLIC BLOOD PRESSURE: 120 MMHG | TEMPERATURE: 97.6 F | DIASTOLIC BLOOD PRESSURE: 80 MMHG | BODY MASS INDEX: 26.85 KG/M2 | HEART RATE: 72 BPM

## 2025-05-15 DIAGNOSIS — E53.8 VITAMIN B12 DEFICIENCY: ICD-10-CM

## 2025-05-15 DIAGNOSIS — H81.03 MENIERE'S DISEASE OF BOTH EARS: ICD-10-CM

## 2025-05-15 DIAGNOSIS — S46.211A RUPTURE OF RIGHT BICEPS TENDON, INITIAL ENCOUNTER: Primary | ICD-10-CM

## 2025-05-15 DIAGNOSIS — E03.9 ACQUIRED HYPOTHYROIDISM: ICD-10-CM

## 2025-05-15 DIAGNOSIS — T85.42XA DISPLACEMENT OF BREAST IMPLANT, INITIAL ENCOUNTER: ICD-10-CM

## 2025-05-15 DIAGNOSIS — E66.3 OVERWEIGHT WITH BODY MASS INDEX (BMI) OF 26 TO 26.9 IN ADULT: ICD-10-CM

## 2025-05-15 DIAGNOSIS — J84.10 LUNG GRANULOMA (MULTI): ICD-10-CM

## 2025-05-15 DIAGNOSIS — M25.511 ACUTE PAIN OF RIGHT SHOULDER: ICD-10-CM

## 2025-05-15 PROCEDURE — 1036F TOBACCO NON-USER: CPT | Performed by: FAMILY MEDICINE

## 2025-05-15 PROCEDURE — 3008F BODY MASS INDEX DOCD: CPT | Performed by: FAMILY MEDICINE

## 2025-05-15 PROCEDURE — 99214 OFFICE O/P EST MOD 30 MIN: CPT | Performed by: FAMILY MEDICINE

## 2025-05-15 RX ORDER — LEVOTHYROXINE SODIUM 50 UG/1
50 TABLET ORAL DAILY
Qty: 90 TABLET | Refills: 3 | Status: SHIPPED | OUTPATIENT
Start: 2025-05-15 | End: 2026-05-15

## 2025-05-15 RX ORDER — CYANOCOBALAMIN 1000 UG/ML
INJECTION, SOLUTION INTRAMUSCULAR; SUBCUTANEOUS
Qty: 10 ML | Refills: 1 | Status: SHIPPED | OUTPATIENT
Start: 2025-05-15

## 2025-05-15 RX ORDER — SEMAGLUTIDE 0.68 MG/ML
0.25 INJECTION, SOLUTION SUBCUTANEOUS
Qty: 3 ML | Refills: 0 | COMMUNITY
Start: 2025-05-15 | End: 2025-05-17

## 2025-05-15 RX ORDER — TRIAMTERENE/HYDROCHLOROTHIAZID 37.5-25 MG
1 TABLET ORAL DAILY
Qty: 90 TABLET | Refills: 3 | Status: SHIPPED | OUTPATIENT
Start: 2025-05-15

## 2025-05-15 ASSESSMENT — ANXIETY QUESTIONNAIRES
IF YOU CHECKED OFF ANY PROBLEMS ON THIS QUESTIONNAIRE, HOW DIFFICULT HAVE THESE PROBLEMS MADE IT FOR YOU TO DO YOUR WORK, TAKE CARE OF THINGS AT HOME, OR GET ALONG WITH OTHER PEOPLE: NOT DIFFICULT AT ALL
GAD7 TOTAL SCORE: 0
7. FEELING AFRAID AS IF SOMETHING AWFUL MIGHT HAPPEN: NOT AT ALL
5. BEING SO RESTLESS THAT IT IS HARD TO SIT STILL: NOT AT ALL
6. BECOMING EASILY ANNOYED OR IRRITABLE: NOT AT ALL
4. TROUBLE RELAXING: NOT AT ALL
1. FEELING NERVOUS, ANXIOUS, OR ON EDGE: NOT AT ALL
3. WORRYING TOO MUCH ABOUT DIFFERENT THINGS: NOT AT ALL
2. NOT BEING ABLE TO STOP OR CONTROL WORRYING: NOT AT ALL

## 2025-05-15 ASSESSMENT — ENCOUNTER SYMPTOMS
DEPRESSION: 0
LOSS OF SENSATION IN FEET: 0
OCCASIONAL FEELINGS OF UNSTEADINESS: 0

## 2025-05-15 NOTE — PROGRESS NOTES
Subjective   Patient ID: Malathi Castellanos is a 60 y.o. female who presents for Engorged Right Breast . I last saw the patient on 11/12/2024  .     HPI  Patient is having right shoulder pain as well as enlargement of her right breast. This started 3 weeks ago and has been getting worse. She just randomly noticed it bothering her especially when she laid in bed that night, and rolled over on it. Patient has a hx of breast cancer in right breast. She states the right breast is at times getting engorged more days than other. At this point, the breast has enlarged to the point where it does not fit in the bra. At night, the pain will become so severe and shoot down the arm keeping her away. She does not remember doing anything to cause the shoulder pain, and states it is not getting better.      Patient has been following up with Dr. Hamilton. She states she is doing good at this time. She has been on phentermine for weight loss for three weeks, but the medication has stopped working. She tried to switch over to the weight loss shots, but her insurance would not cover it. Patient states that her bariatric surgeon is agreeable to her reinstitution of Ozempic or Monjauro for additional weight loss.     Past medical, surgical, and family history reviewed.  Reviewed and documented all medications   Pt eating well, exercising as tolerated and taking medications as directed.      Review of Systems  Except positives as noted in the CC & HPI      Constitutional: Denies fevers, chills, night sweats, fatigue, weight changes, change in appetite    Eyes: Denies blurry vision, double vision    ENT: Denies otalgia, trouble hearing, tinnitus, vertigo, nasal congestion, rhinorrhea, sore throat    Neck: Denies swelling, masses    Cardiovascular: Denies chest pain, palpitations, edema, orthopnea, syncope    Respiratory: Denies dyspnea, cough, wheezing, postural nocturnal dyspnea    Gastrointestinal: Denies abdominal pain, nausea, vomiting,  "diarrhea, constipation, melena, hematochezia    Genitourinary: Denies dysuria, hematuria  Musculoskeletal: Denies back pain, neck pain, arthralgias, myalgias    Integumentary: Denies skin lesions, rashes, masses    Neurological: Denies dizziness, headaches, confusion, limb weakness, paresthesias, syncope, convulsions    Psychiatric: Denies depression, anxiety, homicidal ideations, suicidal ideations, sleep disturbances    Endocrine: Denies polyphagia, polydipsia, polyuria, weakness, hair thinning, heat intolerance, cold intolerance, weight changes    Heme/Lymph: Denies easy bruising, easy bleeding, swollen glands    Objective   Visit Vitals  /80   Pulse 72   Temp 36.4 °C (97.6 °F)   Resp 16   Ht 1.702 m (5' 7\")   Wt 77.6 kg (171 lb 1.6 oz)   SpO2 99%   BMI 26.80 kg/m²   OB Status Hysterectomy   Smoking Status Former   BSA 1.92 m²       Physical Exam    Gen. Appearance - well-developed, well-nourished in no acute distress.     Skin - warm and dry without rash or concerning lesions.     Mental Status - alert and oriented times 3. Normal mood and affect appropriate to mood.     Neck - supple without lymphadenopathy. Carotid pulses are normal without bruits. Thyroid is normal in midline without nodules.    Chest - lungs are clear to auscultation without rales, rhonchi or wheezes.     Heart - regular, rate, and rhythm without murmurs, rubs or gallops.     Extremities - no cyanosis, clubbing. Pedal pulses are 2+ normal at the dorsalis pedis and posterior tibial pulses bilaterally.   Right Shoulder - ROM reveals 90° of external range of motion. T7 internal range of motion. Normal abduction and normal adduction across the chest. Motor strength 5/5 in all directions. Drop arm testing is negative. Pain is noted with extremes of range of motion and with palpation over the bicipital groove. Defect of the upper arm anteriorly with some increase soft tissue prominence of the supper chest, above her right breast implant. " Right breast implant does seem to be mckeon than the left. No pain to palpation, no erythema, or increased warmth.   Chaperone: Rhonda Tompkins.    Neurological - cranial nerves II through XII are grossly intact. Motor strength 5/5 at all fours.     Assessment   1. Rupture of right biceps tendon, initial encounter  MR shoulder right wo IV contrast      2. Acute pain of right shoulder  MR shoulder right wo IV contrast      3. Displacement of breast implant, initial encounter        4. Vitamin B12 deficiency  cyanocobalamin (Vitamin B-12) 1,000 mcg/mL injection      5. Acquired hypothyroidism  levothyroxine (Synthroid, Levoxyl) 50 mcg tablet      6. Meniere's disease of both ears  triamterene-hydrochlorothiazid (Maxzide-25) 37.5-25 mg tablet      7. Lung granuloma (Multi)        8. Overweight with body mass index (BMI) of 26 to 26.9 in adult  semaglutide (Ozempic) 0.25 mg or 0.5 mg (2 mg/3 mL) pen injector          Patient to continue current medications (with any exceptions as noted) and diet.   Discussed with the patient today that  I believe her right should pain is due to a rupture of the right bicep tendon. Order for MRI of the right shoulder for further evaluation.   Patient was given refill(s) on:   Cyanocobalamin injection  Levothyroxine  Triamterene-HCTZ    Rx(s) sent to pharmacy.   Follow-up in one month(s) otherwise as needed.        All medical record entries made by the scribe were at my direction and personally dictated by me. I have reviewed the chart and agree that the record accurately reflects my personal performance of the history, physical exam, discussion, and plan.    Allan Tubbs M.D.      [facility] Attestation  By signing my name below, I, Faizan Ngo   attest that this documentation has been prepared under the direction and in the presence of Allan Tubbs MD.

## 2025-05-16 NOTE — ASSESSMENT & PLAN NOTE
Lung granuloma is stable.  Patient to continue with current medications and treatment plan.  Follow-up at least annually.  Continue to monitor.

## 2025-06-03 ENCOUNTER — APPOINTMENT (OUTPATIENT)
Dept: SURGERY | Facility: CLINIC | Age: 60
End: 2025-06-03
Payer: COMMERCIAL

## 2025-06-04 ENCOUNTER — HOSPITAL ENCOUNTER (OUTPATIENT)
Dept: RADIOLOGY | Facility: HOSPITAL | Age: 60
Discharge: HOME | End: 2025-06-04
Payer: COMMERCIAL

## 2025-06-04 DIAGNOSIS — M25.511 ACUTE PAIN OF RIGHT SHOULDER: ICD-10-CM

## 2025-06-04 DIAGNOSIS — S46.211A RUPTURE OF RIGHT BICEPS TENDON, INITIAL ENCOUNTER: ICD-10-CM

## 2025-06-04 PROCEDURE — 73221 MRI JOINT UPR EXTREM W/O DYE: CPT | Mod: RIGHT SIDE | Performed by: STUDENT IN AN ORGANIZED HEALTH CARE EDUCATION/TRAINING PROGRAM

## 2025-06-04 PROCEDURE — 73221 MRI JOINT UPR EXTREM W/O DYE: CPT | Mod: RT

## 2025-06-08 NOTE — RESULT ENCOUNTER NOTE
Please call the patient regarding her abnormal result.  MRI of the right shoulder does reveal near complete tear of the intra-articular long head biceps tendon with retraction to the level of the proximal humerus.  Moderate supraspinatus tendinosis with a small low-grade partial-thickness articular sided tear at the insertion.  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  Recommend referral to orthopedic surgery for further evaluation and treatment as indicated.

## 2025-06-12 ENCOUNTER — APPOINTMENT (OUTPATIENT)
Dept: PRIMARY CARE | Facility: CLINIC | Age: 60
End: 2025-06-12
Payer: COMMERCIAL

## 2025-06-12 VITALS
HEART RATE: 76 BPM | SYSTOLIC BLOOD PRESSURE: 112 MMHG | HEIGHT: 67 IN | OXYGEN SATURATION: 99 % | DIASTOLIC BLOOD PRESSURE: 78 MMHG | WEIGHT: 167.2 LBS | RESPIRATION RATE: 16 BRPM | TEMPERATURE: 97.6 F | BODY MASS INDEX: 26.24 KG/M2

## 2025-06-12 DIAGNOSIS — M25.511 ACUTE PAIN OF RIGHT SHOULDER: ICD-10-CM

## 2025-06-12 DIAGNOSIS — E66.3 OVERWEIGHT WITH BODY MASS INDEX (BMI) OF 26 TO 26.9 IN ADULT: ICD-10-CM

## 2025-06-12 DIAGNOSIS — E03.9 ACQUIRED HYPOTHYROIDISM: ICD-10-CM

## 2025-06-12 DIAGNOSIS — S46.211D RUPTURE OF RIGHT BICEPS TENDON, SUBSEQUENT ENCOUNTER: Primary | ICD-10-CM

## 2025-06-12 PROCEDURE — 3008F BODY MASS INDEX DOCD: CPT | Performed by: FAMILY MEDICINE

## 2025-06-12 PROCEDURE — 99213 OFFICE O/P EST LOW 20 MIN: CPT | Performed by: FAMILY MEDICINE

## 2025-06-12 RX ORDER — SEMAGLUTIDE 0.68 MG/ML
0.25 INJECTION, SOLUTION SUBCUTANEOUS
Qty: 3 ML | Refills: 0 | Status: CANCELLED | OUTPATIENT
Start: 2025-06-12 | End: 2025-06-14

## 2025-06-12 ASSESSMENT — ANXIETY QUESTIONNAIRES
IF YOU CHECKED OFF ANY PROBLEMS ON THIS QUESTIONNAIRE, HOW DIFFICULT HAVE THESE PROBLEMS MADE IT FOR YOU TO DO YOUR WORK, TAKE CARE OF THINGS AT HOME, OR GET ALONG WITH OTHER PEOPLE: NOT DIFFICULT AT ALL
6. BECOMING EASILY ANNOYED OR IRRITABLE: NOT AT ALL
2. NOT BEING ABLE TO STOP OR CONTROL WORRYING: NOT AT ALL
4. TROUBLE RELAXING: NOT AT ALL
3. WORRYING TOO MUCH ABOUT DIFFERENT THINGS: NOT AT ALL
7. FEELING AFRAID AS IF SOMETHING AWFUL MIGHT HAPPEN: NOT AT ALL
5. BEING SO RESTLESS THAT IT IS HARD TO SIT STILL: NOT AT ALL
GAD7 TOTAL SCORE: 0
1. FEELING NERVOUS, ANXIOUS, OR ON EDGE: NOT AT ALL

## 2025-06-12 ASSESSMENT — ENCOUNTER SYMPTOMS
LOSS OF SENSATION IN FEET: 0
OCCASIONAL FEELINGS OF UNSTEADINESS: 0
DEPRESSION: 0

## 2025-06-12 NOTE — PROGRESS NOTES
Subjective   Patient ID: Malathi Castellanos is a 60 y.o. female who presents for Follow Up of Hypothyroidism, Shoulder Pain and Breast Implant Displacement . I last saw the patient on 5/15/2025  .     HPI  Patient has MRI of right shoulder to be reviewed today. Patient has met with pain management who suggest injections to the shoulder for the pain. If she would prefer to hold off as this time since the pain has gotten better and now is just worse at night, we can try a muscle relaxer for her to take at night. Notes swelling to the right breast area and shoulder has gone down. She has good use of her right arm, but tries to be careful about reinjury.     Patient is currently on ozempic  Last in office weight - 171 lb  Today's in office weight - 167.2lb  Net loss - 3.8  Patient denies any side effects.    Past medical, surgical, and family history reviewed.  Reviewed and documented all medications   Pt eating well, exercising as tolerated and taking medications as directed.      Review of Systems  Except positives as noted in the CC & HPI      Constitutional: Denies fevers, chills, night sweats, fatigue, weight changes, change in appetite    Eyes: Denies blurry vision, double vision    ENT: Denies otalgia, trouble hearing, tinnitus, vertigo, nasal congestion, rhinorrhea, sore throat    Neck: Denies swelling, masses    Cardiovascular: Denies chest pain, palpitations, edema, orthopnea, syncope    Respiratory: Denies dyspnea, cough, wheezing, postural nocturnal dyspnea    Gastrointestinal: Denies abdominal pain, nausea, vomiting, diarrhea, constipation, melena, hematochezia    Genitourinary: Denies dysuria, hematuria  Musculoskeletal: Denies back pain, neck pain, arthralgias, myalgias    Integumentary: Denies skin lesions, rashes, masses    Neurological: Denies dizziness, headaches, confusion, limb weakness, paresthesias, syncope, convulsions    Psychiatric: Denies depression, anxiety, homicidal ideations, suicidal  "ideations, sleep disturbances    Endocrine: Denies polyphagia, polydipsia, polyuria, weakness, hair thinning, heat intolerance, cold intolerance, weight changes    Heme/Lymph: Denies easy bruising, easy bleeding, swollen glands    Objective   Visit Vitals  /78   Pulse 76   Temp 36.4 °C (97.6 °F)   Resp 16   Ht 1.702 m (5' 7\")   Wt 75.8 kg (167 lb 3.2 oz)   SpO2 99%   BMI 26.19 kg/m²   OB Status Hysterectomy   Smoking Status Former   BSA 1.89 m²       Physical Exam    Gen. Appearance - well-developed, well-nourished in no acute distress.     Skin - warm and dry without rash or concerning lesions.     Mental Status - alert and oriented times 3. Normal mood and affect appropriate to mood.     Neck - supple without lymphadenopathy. Carotid pulses are normal without bruits. Thyroid is normal in midline without nodules.    Chest - lungs are clear to auscultation without rales, rhonchi or wheezes.     Heart - regular, rate, and rhythm without murmurs, rubs or gallops.     Abdomen - soft, obese, protuberant, nondistended, nontender . No masses, hepatomegaly or splenomegaly is noted. No rebound, rigidity or guarding is noted. Bowel sounds are normoactive.     Extremities - no cyanosis, clubbing. Pedal pulses are 2+ normal at the dorsalis pedis and posterior tibial pulses bilaterally.     Right shoulder - remarkably improved edema of the shoulder and right upper breast.  Patient has normal resistance to flexion of the forearm.    Neurological - cranial nerves II through XII are grossly intact. Motor strength 5/5 at all fours.     Assessment   1. Rupture of right biceps tendon, subsequent encounter  tiZANidine (Zanaflex) 4 mg tablet      2. Acute pain of right shoulder        3. Acquired hypothyroidism        4. Overweight with body mass index (BMI) of 26 to 26.9 in adult  semaglutide, weight loss, (Wegovy) 0.25 mg/0.5 mL pen injector          Patient to continue current medications (with any exceptions as noted) and " diet.     Start Tizanidine 4mg one capsule at bedtime for right shoulder pain.     Patient was given samples of Wegovy today in clinic.     Follow-up in one month(s) otherwise as needed.            Scribe Attestation  By signing my name below, I, Franca Faizan Tyson   attest that this documentation has been prepared under the direction and in the presence of Chary Tubbs MD.     This note has been transcribed using a medical scribe and there is a possibility of unintentional typing misprints.     All medical record entries made by the scribe were at my direction and personally dictated by me. I have reviewed the chart and agree that the record accurately reflects my personal performance of the history, physical exam, discussion, and plan.     CHARY TUBBS M.D.

## 2025-06-13 RX ORDER — SEMAGLUTIDE 0.25 MG/.5ML
1 INJECTION, SOLUTION SUBCUTANEOUS WEEKLY
Qty: 8 ML | Refills: 0 | COMMUNITY
Start: 2025-06-13 | End: 2025-07-05

## 2025-06-13 RX ORDER — TIZANIDINE 4 MG/1
4 TABLET ORAL NIGHTLY PRN
Qty: 30 TABLET | Refills: 1 | Status: SHIPPED | OUTPATIENT
Start: 2025-06-13 | End: 2025-08-12

## 2025-07-15 ENCOUNTER — APPOINTMENT (OUTPATIENT)
Dept: PRIMARY CARE | Facility: CLINIC | Age: 60
End: 2025-07-15
Payer: COMMERCIAL

## 2025-07-15 VITALS
SYSTOLIC BLOOD PRESSURE: 112 MMHG | DIASTOLIC BLOOD PRESSURE: 68 MMHG | RESPIRATION RATE: 16 BRPM | TEMPERATURE: 97.6 F | HEIGHT: 67 IN | OXYGEN SATURATION: 95 % | HEART RATE: 85 BPM | WEIGHT: 166 LBS | BODY MASS INDEX: 26.06 KG/M2

## 2025-07-15 DIAGNOSIS — E66.3 OVERWEIGHT WITH BODY MASS INDEX (BMI) OF 26 TO 26.9 IN ADULT: Primary | ICD-10-CM

## 2025-07-15 PROCEDURE — 99213 OFFICE O/P EST LOW 20 MIN: CPT | Performed by: FAMILY MEDICINE

## 2025-07-15 PROCEDURE — 3008F BODY MASS INDEX DOCD: CPT | Performed by: FAMILY MEDICINE

## 2025-07-15 PROCEDURE — 1036F TOBACCO NON-USER: CPT | Performed by: FAMILY MEDICINE

## 2025-07-15 RX ORDER — SEMAGLUTIDE 1 MG/.5ML
1 INJECTION, SOLUTION SUBCUTANEOUS
Qty: 2 ML | Refills: 1 | Status: CANCELLED | OUTPATIENT
Start: 2025-07-20 | End: 2025-08-11

## 2025-07-15 NOTE — PROGRESS NOTES
Subjective   Patient ID: Malathi Castellanos is a 60 y.o. female who presents for Follow-Up on Obesity . I last saw the patient on 6/12/2025.     HPI  Patient is currently on Wegovy  Last in office weight - 167.2lb  Today's in office weight - 166 lb  Net loss - 1.2  Patient denies any side effects.    Patient goal weight is to get to 150 lb. She would like to do 1 more month of treatment before discontinuing.    Patient was given Wegovy from the clinic at last office visit, has no concerns or side effects. Patient did not notice much appetite suppression with the last samples. She would like a higher dose of medication. Bowel movements have been normal.    Past medical, surgical, and family history reviewed.  Reviewed and documented all medications   Pt eating well, exercising as tolerated and taking medications as directed.      Review of Systems  Except positives as noted in the CC & HPI      Constitutional: Denies fevers, chills, night sweats, fatigue, weight changes, change in appetite    Eyes: Denies blurry vision, double vision    ENT: Denies otalgia, trouble hearing, tinnitus, vertigo, nasal congestion, rhinorrhea, sore throat    Neck: Denies swelling, masses    Cardiovascular: Denies chest pain, palpitations, edema, orthopnea, syncope    Respiratory: Denies dyspnea, cough, wheezing, postural nocturnal dyspnea    Gastrointestinal: Denies abdominal pain, nausea, vomiting, diarrhea, constipation, melena, hematochezia    Genitourinary: Denies dysuria, hematuria  Musculoskeletal: Denies back pain, neck pain, arthralgias, myalgias    Integumentary: Denies skin lesions, rashes, masses    Neurological: Denies dizziness, headaches, confusion, limb weakness, paresthesias, syncope, convulsions    Psychiatric: Denies depression, anxiety, homicidal ideations, suicidal ideations, sleep disturbances    Endocrine: Denies polyphagia, polydipsia, polyuria, weakness, hair thinning, heat intolerance, cold intolerance, weight changes   "  Heme/Lymph: Denies easy bruising, easy bleeding, swollen glands    Objective   Visit Vitals  /68   Pulse 85   Temp 36.4 °C (97.6 °F)   Resp 16   Ht 1.702 m (5' 7\")   Wt 75.3 kg (166 lb)   SpO2 95%   BMI 26.00 kg/m²   OB Status Hysterectomy   Smoking Status Former   BSA 1.89 m²       Physical Exam    Gen. Appearance - well-developed, well-nourished in no acute distress.      Skin - warm and dry without rash or concerning lesions.      Mental Status - alert and oriented times 3. Normal mood and affect appropriate to mood.      Neck - supple without lymphadenopathy. Carotid pulses are normal without bruits. Thyroid is normal in midline without nodules.     Chest - lungs are clear to auscultation without rales, rhonchi or wheezes.      Heart - regular, rate, and rhythm without murmurs, rubs or gallops.      Abdomen - soft, flat, nondistended, nontender . No masses, hepatomegaly or splenomegaly is noted. No rebound, rigidity or guarding is noted. Bowel sounds are normoactive.      Extremities - no cyanosis, clubbing or edema. Pedal pulses are 2+ normal at the dorsalis pedis and posterior tibial pulses bilaterally.      Neurological - cranial nerves II through XII are grossly intact. Motor strength 5/5 at all fours.     Assessment   1. Overweight with body mass index (BMI) of 26 to 26.9 in adult  semaglutide 0.25 mg or 0.5 mg (2 mg/3 mL) pen injector        For weight management I recommend exercising and remaining physically active. Try to maintain a heathy diet that includes green leafy vegetables, fruits and proteins. Patient was encouraged to stay well hydrated.     Patient to continue current medications (with any exceptions as noted) and diet. Follow-up in 1 month(s) otherwise as needed.      Patient was started on:   Ozempic (0.5 mg/dose), INJECT 1 MG WEEKLY X 2 SAMPLE BOXES GIVEN.    Rx(s) sent to pharmacy.      Plan on discontinuing medication after this month.    Scribe Attestation  By signing my name " below, I, Suzy Smith Scribe   attest that this documentation has been prepared under the direction and in the presence of Chary Tubbs MD.      This note has been transcribed using a medical scribe and there is a possibility of unintentional typing misprints.     All medical record entries made by the scribe were at my direction and personally dictated by me. I have reviewed the chart and agree that the record accurately reflects my personal performance of the history, physical exam, discussion, and plan.     CHARY TUBBS M.D.

## (undated) DEVICE — TUBE SET, PNEUMOLAR HEATED, SMOKE EVACU, HIGH-FLOW

## (undated) DEVICE — TROCAR, OPTICAL, BLADELESS, KII FIOS, 5 X 100MM, THREADED

## (undated) DEVICE — RELOAD, ENDOSTITCH, SURGIDAC, 2-0, 48 IN, GREEN, SULU

## (undated) DEVICE — SLEEVE, VASO PRESS, CALF GARMENT, LARGE, GREEN

## (undated) DEVICE — RELOAD, ENDOSTITCH, POLYSORB, 2-0, 48 IN, ES9, VIOLET, SULU

## (undated) DEVICE — BINDER, ABDOMINAL, 4 PANEL, 12 X 63-74 IN

## (undated) DEVICE — IRRIGATOR, HYDRO-SURG PLUS, WITH COJOINED SUCTION AND IRRIGATION

## (undated) DEVICE — CARE KIT, LAPAROSCOPIC, ADVANCED

## (undated) DEVICE — SLEEVE, KII, Z-THREAD, 5X100CM

## (undated) DEVICE — NEEDLE, EPIDURAL 17G X 4 1/2 PERIFIX

## (undated) DEVICE — BINDER, ABDOMINAL, 4 PANEL, 12 X 30-45 IN

## (undated) DEVICE — STAPLER, ECHELON 3000, 60MM LONG

## (undated) DEVICE — KIT, LAP GASTRIC BYPASS, CUSTOM, PARMA

## (undated) DEVICE — RETRIEVAL SYSTEM, MONARCH, 10MM DISP ENDOSCOPIC

## (undated) DEVICE — STAPLER,  LINEAR RELOAD, 60MM, WHITE, DISP

## (undated) DEVICE — TROCAR, OPTICAL, BLADELESS, 12MM, THREADED, 100MM LENGTH

## (undated) DEVICE — PROTECTOR, HEEL/ANKLE/ELBOW, UNIVERSAL

## (undated) DEVICE — NEEDLE, CLOSURE, OMNICLOSE

## (undated) DEVICE — SYRINGE, 30 CC, LUER LOCK

## (undated) DEVICE — DEVICE, SUTURE, ENDOSTITCH, 10 MM

## (undated) DEVICE — APPLICATOR, CHLORAPREP, W/ORANGE TINT, 26ML

## (undated) DEVICE — LIGASURE, L-HOOK 44CM SEALER/DIVIDER LAP, MARYLAND

## (undated) DEVICE — NEEDLE, HYPODERMIC, SAFETYGLIDE, SHIELDING, REGULAR WALL, REGULAR BEVEL, 22 G X 1.5 IN, BLACK HUB